# Patient Record
Sex: FEMALE | Race: WHITE | NOT HISPANIC OR LATINO | Employment: OTHER | URBAN - METROPOLITAN AREA
[De-identification: names, ages, dates, MRNs, and addresses within clinical notes are randomized per-mention and may not be internally consistent; named-entity substitution may affect disease eponyms.]

---

## 2018-04-16 ENCOUNTER — OFFICE VISIT CONVERTED (OUTPATIENT)
Dept: ORTHOPEDIC SURGERY | Facility: CLINIC | Age: 68
End: 2018-04-16
Attending: ORTHOPAEDIC SURGERY

## 2018-04-27 ENCOUNTER — OFFICE VISIT CONVERTED (OUTPATIENT)
Dept: CARDIOLOGY | Facility: CLINIC | Age: 68
End: 2018-04-27
Attending: INTERNAL MEDICINE

## 2018-05-23 ENCOUNTER — CONVERSION ENCOUNTER (OUTPATIENT)
Dept: GENERAL RADIOLOGY | Facility: HOSPITAL | Age: 68
End: 2018-05-23

## 2018-08-13 ENCOUNTER — CONVERSION ENCOUNTER (OUTPATIENT)
Dept: NEUROLOGY | Facility: CLINIC | Age: 68
End: 2018-08-13

## 2018-08-13 ENCOUNTER — OFFICE VISIT CONVERTED (OUTPATIENT)
Dept: NEUROSURGERY | Facility: CLINIC | Age: 68
End: 2018-08-13
Attending: PHYSICIAN ASSISTANT

## 2018-09-20 ENCOUNTER — OFFICE VISIT CONVERTED (OUTPATIENT)
Dept: GASTROENTEROLOGY | Facility: CLINIC | Age: 68
End: 2018-09-20
Attending: NURSE PRACTITIONER

## 2018-10-31 ENCOUNTER — OFFICE VISIT CONVERTED (OUTPATIENT)
Dept: ORTHOPEDIC SURGERY | Facility: CLINIC | Age: 68
End: 2018-10-31
Attending: ORTHOPAEDIC SURGERY

## 2018-11-08 ENCOUNTER — OFFICE VISIT CONVERTED (OUTPATIENT)
Dept: CARDIOLOGY | Facility: CLINIC | Age: 68
End: 2018-11-08
Attending: INTERNAL MEDICINE

## 2019-04-02 ENCOUNTER — CONVERSION ENCOUNTER (OUTPATIENT)
Dept: CARDIOLOGY | Facility: CLINIC | Age: 69
End: 2019-04-02

## 2019-04-10 ENCOUNTER — OFFICE VISIT CONVERTED (OUTPATIENT)
Dept: CARDIOLOGY | Facility: CLINIC | Age: 69
End: 2019-04-10
Attending: INTERNAL MEDICINE

## 2019-04-18 ENCOUNTER — OFFICE VISIT CONVERTED (OUTPATIENT)
Dept: CARDIOLOGY | Facility: CLINIC | Age: 69
End: 2019-04-18
Attending: INTERNAL MEDICINE

## 2019-04-30 ENCOUNTER — HOSPITAL ENCOUNTER (OUTPATIENT)
Dept: LAB | Facility: HOSPITAL | Age: 69
Discharge: HOME | End: 2019-04-30
Attending: INTERNAL MEDICINE

## 2019-04-30 LAB
ANION GAP SERPL CALC-SCNC: 15 MMOL/L (ref 8–19)
BNP SERPL-MCNC: 394 PG/ML (ref 0–900)
BUN SERPL-MCNC: 22 MG/DL (ref 5–25)
BUN/CREAT SERPL: 24 {RATIO} (ref 6–20)
CALCIUM SERPL-MCNC: 9.7 MG/DL (ref 8.7–10.4)
CHLORIDE SERPL-SCNC: 102 MMOL/L (ref 99–111)
CONV CO2: 27 MMOL/L (ref 22–32)
CREAT UR-MCNC: 0.93 MG/DL (ref 0.5–0.9)
GFR SERPLBLD BASED ON 1.73 SQ M-ARVRAT: >60 ML/MIN/{1.73_M2}
GLUCOSE SERPL-MCNC: 109 MG/DL (ref 65–99)
OSMOLALITY SERPL CALC.SUM OF ELEC: 294 MOSM/KG (ref 273–304)
POTASSIUM SERPL-SCNC: 4.1 MMOL/L (ref 3.5–5.3)
SODIUM SERPL-SCNC: 140 MMOL/L (ref 135–147)

## 2019-05-02 ENCOUNTER — HOSPITAL ENCOUNTER (OUTPATIENT)
Dept: GENERAL RADIOLOGY | Facility: HOSPITAL | Age: 69
Discharge: HOME | End: 2019-05-02
Attending: NURSE PRACTITIONER

## 2019-05-30 ENCOUNTER — HOSPITAL ENCOUNTER (OUTPATIENT)
Dept: LAB | Facility: HOSPITAL | Age: 69
Discharge: HOME | End: 2019-05-30
Attending: INTERNAL MEDICINE

## 2019-05-30 ENCOUNTER — OFFICE VISIT CONVERTED (OUTPATIENT)
Dept: CARDIOLOGY | Facility: CLINIC | Age: 69
End: 2019-05-30
Attending: INTERNAL MEDICINE

## 2019-05-30 LAB
ALBUMIN SERPL-MCNC: 4.2 G/DL (ref 3.5–5)
ALBUMIN/GLOB SERPL: 1.6 {RATIO} (ref 1.4–2.6)
ALP SERPL-CCNC: 91 U/L (ref 43–160)
ALT SERPL-CCNC: 17 U/L (ref 10–40)
ANION GAP SERPL CALC-SCNC: 16 MMOL/L (ref 8–19)
AST SERPL-CCNC: 15 U/L (ref 15–50)
BASOPHILS # BLD AUTO: 0.15 10*3/UL (ref 0–0.2)
BASOPHILS NFR BLD AUTO: 1.5 % (ref 0–3)
BILIRUB SERPL-MCNC: 0.41 MG/DL (ref 0.2–1.3)
BUN SERPL-MCNC: 22 MG/DL (ref 5–25)
BUN/CREAT SERPL: 20 {RATIO} (ref 6–20)
CALCIUM SERPL-MCNC: 9.4 MG/DL (ref 8.7–10.4)
CHLORIDE SERPL-SCNC: 100 MMOL/L (ref 99–111)
CHOLEST SERPL-MCNC: 203 MG/DL (ref 107–200)
CHOLEST/HDLC SERPL: 4 {RATIO} (ref 3–6)
CONV ABS IMM GRAN: 0.04 10*3/UL (ref 0–0.2)
CONV CO2: 27 MMOL/L (ref 22–32)
CONV IMMATURE GRAN: 0.4 % (ref 0–1.8)
CONV TOTAL PROTEIN: 6.9 G/DL (ref 6.3–8.2)
CREAT UR-MCNC: 1.12 MG/DL (ref 0.5–0.9)
DEPRECATED RDW RBC AUTO: 39.1 FL (ref 36.4–46.3)
EOSINOPHIL # BLD AUTO: 0.23 10*3/UL (ref 0–0.7)
EOSINOPHIL # BLD AUTO: 2.4 % (ref 0–7)
ERYTHROCYTE [DISTWIDTH] IN BLOOD BY AUTOMATED COUNT: 19.5 % (ref 11.7–14.4)
GFR SERPLBLD BASED ON 1.73 SQ M-ARVRAT: 50 ML/MIN/{1.73_M2}
GLOBULIN UR ELPH-MCNC: 2.7 G/DL (ref 2–3.5)
GLUCOSE SERPL-MCNC: 103 MG/DL (ref 65–99)
HBA1C MFR BLD: 11.8 G/DL (ref 12–16)
HCT VFR BLD AUTO: 39.2 % (ref 37–47)
HDLC SERPL-MCNC: 51 MG/DL (ref 40–60)
LDLC SERPL CALC-MCNC: 129 MG/DL (ref 70–100)
LYMPHOCYTES # BLD AUTO: 1.96 10*3/UL (ref 1–5)
MCH RBC QN AUTO: 18.8 PG (ref 27–31)
MCHC RBC AUTO-ENTMCNC: 30.1 G/DL (ref 33–37)
MCV RBC AUTO: 62.6 FL (ref 81–99)
MONOCYTES # BLD AUTO: 0.81 10*3/UL (ref 0.2–1.2)
MONOCYTES NFR BLD AUTO: 8.3 % (ref 3–10)
NEUTROPHILS # BLD AUTO: 6.56 10*3/UL (ref 2–8)
NEUTROPHILS NFR BLD AUTO: 67.3 % (ref 30–85)
NRBC CBCN: 0 % (ref 0–0.7)
OSMOLALITY SERPL CALC.SUM OF ELEC: 292 MOSM/KG (ref 273–304)
PLATELET # BLD AUTO: 221 10*3/UL (ref 130–400)
PMV BLD AUTO: ABNORMAL FL (ref 9.4–12.3)
POTASSIUM SERPL-SCNC: 4.4 MMOL/L (ref 3.5–5.3)
RBC # BLD AUTO: 6.26 10*6/UL (ref 4.2–5.4)
SODIUM SERPL-SCNC: 139 MMOL/L (ref 135–147)
T4 FREE SERPL-MCNC: 1.5 NG/DL (ref 0.9–1.8)
TRIGL SERPL-MCNC: 114 MG/DL (ref 40–150)
TSH SERPL-ACNC: 8.47 M[IU]/L (ref 0.27–4.2)
VARIANT LYMPHS NFR BLD MANUAL: 20.1 % (ref 20–45)
VLDLC SERPL-MCNC: 23 MG/DL (ref 5–37)
WBC # BLD AUTO: 9.75 10*3/UL (ref 4.8–10.8)

## 2019-07-02 ENCOUNTER — HOSPITAL ENCOUNTER (OUTPATIENT)
Dept: LAB | Facility: HOSPITAL | Age: 69
Discharge: HOME | End: 2019-07-02
Attending: INTERNAL MEDICINE

## 2019-07-02 LAB
ALBUMIN SERPL-MCNC: 4 G/DL (ref 3.5–5)
ALBUMIN/GLOB SERPL: 1.5 {RATIO} (ref 1.4–2.6)
ALP SERPL-CCNC: 79 U/L (ref 43–160)
ALT SERPL-CCNC: 34 U/L (ref 10–40)
ANION GAP SERPL CALC-SCNC: 18 MMOL/L (ref 8–19)
AST SERPL-CCNC: 24 U/L (ref 15–50)
BILIRUB SERPL-MCNC: 0.58 MG/DL (ref 0.2–1.3)
BUN SERPL-MCNC: 21 MG/DL (ref 5–25)
BUN/CREAT SERPL: 18 {RATIO} (ref 6–20)
CALCIUM SERPL-MCNC: 9.6 MG/DL (ref 8.7–10.4)
CHLORIDE SERPL-SCNC: 105 MMOL/L (ref 99–111)
CONV CO2: 25 MMOL/L (ref 22–32)
CONV TOTAL PROTEIN: 6.6 G/DL (ref 6.3–8.2)
CREAT UR-MCNC: 1.15 MG/DL (ref 0.5–0.9)
GFR SERPLBLD BASED ON 1.73 SQ M-ARVRAT: 48 ML/MIN/{1.73_M2}
GLOBULIN UR ELPH-MCNC: 2.6 G/DL (ref 2–3.5)
GLUCOSE SERPL-MCNC: 129 MG/DL (ref 65–99)
OSMOLALITY SERPL CALC.SUM OF ELEC: 303 MOSM/KG (ref 273–304)
POTASSIUM SERPL-SCNC: 4 MMOL/L (ref 3.5–5.3)
SODIUM SERPL-SCNC: 144 MMOL/L (ref 135–147)
T4 FREE SERPL-MCNC: 1.8 NG/DL (ref 0.9–1.8)
TSH SERPL-ACNC: 6.14 M[IU]/L (ref 0.27–4.2)

## 2019-07-08 ENCOUNTER — OFFICE VISIT (OUTPATIENT)
Dept: CARDIOLOGY | Facility: CLINIC | Age: 69
End: 2019-07-08

## 2019-07-08 DIAGNOSIS — I10 ESSENTIAL HYPERTENSION: ICD-10-CM

## 2019-07-08 DIAGNOSIS — I48.92 ATRIAL FLUTTER WITH CONTROLLED RESPONSE (HCC): ICD-10-CM

## 2019-07-08 DIAGNOSIS — I48.0 PAROXYSMAL ATRIAL FIBRILLATION (HCC): Primary | ICD-10-CM

## 2019-07-08 DIAGNOSIS — I47.1 ATRIAL TACHYCARDIA (HCC): ICD-10-CM

## 2019-07-08 PROCEDURE — 99214 OFFICE O/P EST MOD 30 MIN: CPT | Performed by: INTERNAL MEDICINE

## 2019-07-08 RX ORDER — FUROSEMIDE 20 MG/1
20 TABLET ORAL DAILY PRN
COMMUNITY
Start: 2019-06-25 | End: 2022-02-24

## 2019-07-08 RX ORDER — DILTIAZEM HYDROCHLORIDE 180 MG/1
180 CAPSULE, COATED, EXTENDED RELEASE ORAL DAILY
COMMUNITY
Start: 2019-06-25 | End: 2021-09-02

## 2019-07-08 RX ORDER — AMIODARONE HYDROCHLORIDE 200 MG/1
200 TABLET ORAL 2 TIMES DAILY
COMMUNITY
Start: 2019-06-11 | End: 2019-08-19

## 2019-07-11 ENCOUNTER — TELEPHONE (OUTPATIENT)
Dept: CARDIOLOGY | Facility: CLINIC | Age: 69
End: 2019-07-11

## 2019-07-11 DIAGNOSIS — I48.91 ATRIAL FIBRILLATION, UNSPECIFIED TYPE (HCC): Primary | ICD-10-CM

## 2019-07-11 NOTE — TELEPHONE ENCOUNTER
Patient saw Dr Oliver on Monday 07/08/19,  Inquiring about her ablation surgery date.  States still having trouble walking and short of breath ( was having trouble when she was here Monday).    [phone] or [phone]

## 2019-07-13 PROBLEM — Z98.890 H/O CARDIAC RADIOFREQUENCY ABLATION: Status: ACTIVE | Noted: 2019-07-13

## 2019-07-13 PROBLEM — I10 ESSENTIAL HYPERTENSION: Status: ACTIVE | Noted: 2019-07-13

## 2019-07-13 PROBLEM — I47.1 ATRIAL TACHYCARDIA: Status: ACTIVE | Noted: 2019-07-13

## 2019-07-13 PROBLEM — I48.0 PAROXYSMAL ATRIAL FIBRILLATION: Status: ACTIVE | Noted: 2019-07-13

## 2019-07-13 PROBLEM — I48.92 ATRIAL FLUTTER WITH CONTROLLED RESPONSE: Status: ACTIVE | Noted: 2019-07-13

## 2019-07-13 PROBLEM — I47.19 ATRIAL TACHYCARDIA: Status: ACTIVE | Noted: 2019-07-13

## 2019-07-13 PROCEDURE — 93000 ELECTROCARDIOGRAM COMPLETE: CPT | Performed by: INTERNAL MEDICINE

## 2019-08-08 ENCOUNTER — HOSPITAL ENCOUNTER (OUTPATIENT)
Dept: LAB | Facility: HOSPITAL | Age: 69
Discharge: HOME | End: 2019-08-08
Attending: INTERNAL MEDICINE

## 2019-08-08 LAB
BASOPHILS # BLD AUTO: 0.09 10*3/UL (ref 0–0.2)
BASOPHILS NFR BLD AUTO: 1 % (ref 0–3)
BURR CELLS BLD QL SMEAR: NORMAL
C3 FRG RBC-MCNC: NORMAL
CONV ABS IMM GRAN: 0.05 10*3/UL (ref 0–0.2)
CONV ANISOCYTES: NORMAL
CONV HYPOCHROMIA IN BLOOD BY LIGHT MICROSCOPY: NORMAL
CONV IMMATURE GRAN: 0.6 % (ref 0–1.8)
DACRYOCYTES BLD QL SMEAR: NORMAL
DEPRECATED RDW RBC AUTO: 37.9 FL (ref 36.4–46.3)
EOSINOPHIL # BLD AUTO: 0.14 10*3/UL (ref 0–0.7)
EOSINOPHIL # BLD AUTO: 1.6 % (ref 0–7)
ERYTHROCYTE [DISTWIDTH] IN BLOOD BY AUTOMATED COUNT: 19.5 % (ref 11.7–14.4)
ERYTHROCYTE [SEDIMENTATION RATE] IN BLOOD: 8 MM/H (ref 0–30)
HCT VFR BLD AUTO: 33.3 % (ref 37–47)
HGB BLD-MCNC: 9.7 G/DL (ref 12–16)
LYMPHOCYTES # BLD AUTO: 0.71 10*3/UL (ref 1–5)
LYMPHOCYTES NFR BLD AUTO: 8 % (ref 20–45)
MCH RBC QN AUTO: 17.4 PG (ref 27–31)
MCHC RBC AUTO-ENTMCNC: 29.1 G/DL (ref 33–37)
MCV RBC AUTO: 59.7 FL (ref 81–99)
MICROCYTES BLD QL: SLIGHT
MONOCYTES # BLD AUTO: 0.79 10*3/UL (ref 0.2–1.2)
MONOCYTES NFR BLD AUTO: 8.9 % (ref 3–10)
NEUTROPHILS # BLD AUTO: 7.08 10*3/UL (ref 2–8)
NEUTROPHILS NFR BLD AUTO: 79.9 % (ref 30–85)
NRBC CBCN: 0 % (ref 0–0.7)
OVALOCYTES BLD QL SMEAR: NORMAL
PLATELET # BLD AUTO: 290 10*3/UL (ref 130–400)
PMV BLD AUTO: ABNORMAL FL (ref 9.4–12.3)
POIKILOCYTOSIS BLD QL SMEAR: SLIGHT
RBC # BLD AUTO: 5.58 10*6/UL (ref 4.2–5.4)
ROULEAUX BLD QL SMEAR: SLIGHT
TARGETS BLD QL SMEAR: SLIGHT
WBC # BLD AUTO: 8.86 10*3/UL (ref 4.8–10.8)

## 2019-08-09 LAB
ALBUMIN SERPL-MCNC: 4 G/DL (ref 3.5–5)
ALBUMIN/GLOB SERPL: 1.7 {RATIO} (ref 1.4–2.6)
ALP SERPL-CCNC: 62 U/L (ref 43–160)
ALT SERPL-CCNC: 37 U/L (ref 10–40)
ANION GAP SERPL CALC-SCNC: 16 MMOL/L (ref 8–19)
AST SERPL-CCNC: 23 U/L (ref 15–50)
BILIRUB SERPL-MCNC: 1.05 MG/DL (ref 0.2–1.3)
BUN SERPL-MCNC: 15 MG/DL (ref 5–25)
BUN/CREAT SERPL: 16 {RATIO} (ref 6–20)
CALCIUM SERPL-MCNC: 9.5 MG/DL (ref 8.7–10.4)
CHLORIDE SERPL-SCNC: 101 MMOL/L (ref 99–111)
CONV CO2: 27 MMOL/L (ref 22–32)
CONV TOTAL PROTEIN: 6.4 G/DL (ref 6.3–8.2)
CREAT UR-MCNC: 0.96 MG/DL (ref 0.5–0.9)
GFR SERPLBLD BASED ON 1.73 SQ M-ARVRAT: 60 ML/MIN/{1.73_M2}
GLOBULIN UR ELPH-MCNC: 2.4 G/DL (ref 2–3.5)
GLUCOSE SERPL-MCNC: 101 MG/DL (ref 65–99)
OSMOLALITY SERPL CALC.SUM OF ELEC: 291 MOSM/KG (ref 273–304)
POTASSIUM SERPL-SCNC: 4 MMOL/L (ref 3.5–5.3)
SODIUM SERPL-SCNC: 140 MMOL/L (ref 135–147)
T4 FREE SERPL-MCNC: 1.8 NG/DL (ref 0.9–1.8)
TSH SERPL-ACNC: 4.06 M[IU]/L (ref 0.27–4.2)

## 2019-08-19 ENCOUNTER — OFFICE VISIT (OUTPATIENT)
Dept: CARDIOLOGY | Facility: CLINIC | Age: 69
End: 2019-08-19

## 2019-08-19 VITALS
HEIGHT: 65 IN | SYSTOLIC BLOOD PRESSURE: 110 MMHG | BODY MASS INDEX: 26.89 KG/M2 | WEIGHT: 161.4 LBS | DIASTOLIC BLOOD PRESSURE: 70 MMHG

## 2019-08-19 DIAGNOSIS — Z98.890 H/O CARDIAC RADIOFREQUENCY ABLATION: ICD-10-CM

## 2019-08-19 DIAGNOSIS — I47.1 ATRIAL TACHYCARDIA (HCC): Primary | ICD-10-CM

## 2019-08-19 DIAGNOSIS — Z95.0 PRESENCE OF CARDIAC PACEMAKER: ICD-10-CM

## 2019-08-19 PROCEDURE — 99213 OFFICE O/P EST LOW 20 MIN: CPT | Performed by: INTERNAL MEDICINE

## 2019-08-19 PROCEDURE — 93000 ELECTROCARDIOGRAM COMPLETE: CPT | Performed by: INTERNAL MEDICINE

## 2019-08-19 RX ORDER — QUETIAPINE FUMARATE 25 MG/1
12.5 TABLET, FILM COATED ORAL NIGHTLY
COMMUNITY
End: 2021-09-02

## 2019-08-19 RX ORDER — PAROXETINE 30 MG/1
30 TABLET, FILM COATED ORAL EVERY MORNING
COMMUNITY
End: 2021-09-02

## 2019-08-20 NOTE — PROGRESS NOTES
Subjective:     Encounter Date:08/19/2019      Patient ID: Suzanna Santos is a 68 y.o. female.    Chief Complaint:  Patient presents for followup post ablation.    HPI:  Ms Santos is a 67 yo patient of Dr. Shook who has a past medical history significant for HTN and paroxysmal atrial fibrillation.  She underwent her first AF ablation in 2008 in [place] and apparently did well for awhile but had recurrent palpitaitons and SVT.  She was taken back to the lab by Dr. THOMPSON in 7/17.  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 from the previojus ablation.  The roof line required additional ablation and she was found to have  3 different left atrial tachycardias.  One was mapped and found to be originating on the anterior LA just below the left atrial appendage.  A left atrial tachycardia was mapped to the lateral mitral annulus and was ablated.  The third focus originated between the RONEN and the LSVP.   She was placed on amiodarone post procedure.  The amio was discontinued 1/18 but she developed recurrent tachy palpitations by 5/18.  An event monitor showed episodes of SVT in the 130's.  She was placed back on amio but continued to have symptoms.    She was taken back to the lab 3/19 and was found to have an atypical right atrial flutter, an ectopic right atrial tachycardia near the coronary sinus os and a aleft atrial tachycardia near the AV node.  All the tachycardias were ablated.  She had a pacemaker placed for significant sinus node dysfunction.    She again did well for awhile but had recurrent SVT in the 130-150 range.  She was taken back to the lab again 7/19 and found to have reentrant left atrial flutter which was ablated by creation of a line between the RONEN and the RSPV.  A focal left atrial tachycardia was ablated from the left side of the interatrial septum and she was rendered noninducible.  Her amiodarone and diltiazem were discontinued but was continued on metoprolol.    Since her ablation she  has done well without recurrent symptoms.    The following portions of the patient's history were reviewed and updated as appropriate: allergies, current medications, past family history, past medical history, past social history, past surgical history and problem list.    Problem List:  Patient Active Problem List   Diagnosis   • Paroxysmal atrial fibrillation (CMS/HCC)   • Atrial tachycardia (CMS/HCC)   • Atrial flutter with controlled response (CMS/HCC)   • H/O cardiac radiofrequency ablation   • Essential hypertension   • Presence of cardiac pacemaker       Past Medical History:  Past Medical History:   Diagnosis Date   • Atrial tachycardia (CMS/HCC)    • GERD (gastroesophageal reflux disease)    • Hypertension    • Paroxysmal atrial fibrillation (CMS/HCC)    • SVT (supraventricular tachycardia) (CMS/HCC)        Past Surgical History:  Past Surgical History:   Procedure Laterality Date   • CARDIAC ELECTROPHYSIOLOGY MAPPING AND ABLATION     • PACEMAKER REPLACEMENT         Social History:  Social History     Socioeconomic History   • Marital status:      Spouse name: Not on file   • Number of children: Not on file   • Years of education: Not on file   • Highest education level: Not on file   Tobacco Use   • Smoking status: Never Smoker   • Smokeless tobacco: Never Used   Substance and Sexual Activity   • Alcohol use: No     Frequency: Never   • Drug use: No       Allergies:  Allergies   Allergen Reactions   • Doxycycline Unknown (See Comments)     .   • Lariam [Mefloquine] Unknown (See Comments)     .   • Lisinopril Hives     .   • Talwin [Pentazocine] Hallucinations       Immunizations:    There is no immunization history on file for this patient.    ROS:  Review of Systems   Constitution: Negative.   HENT: Negative.    Eyes: Negative.    Cardiovascular: Negative for chest pain, dyspnea on exertion, irregular heartbeat, leg swelling, near-syncope, orthopnea, palpitations and paroxysmal nocturnal dyspnea.  "  Respiratory: Negative for shortness of breath.    Endocrine: Negative.    Hematologic/Lymphatic: Negative.    Skin: Negative.    Musculoskeletal: Negative.    Gastrointestinal: Negative.    Genitourinary: Negative.    Neurological: Negative.    Psychiatric/Behavioral: Negative.    Allergic/Immunologic: Negative.           Objective:         /70   Ht 165.1 cm (65\")   Wt 73.2 kg (161 lb 6.4 oz)   BMI 26.86 kg/m²     Physical Exam   Constitutional: She is oriented to person, place, and time. She appears well-developed and well-nourished. No distress.   HENT:   Head: Normocephalic and atraumatic.   Eyes: Conjunctivae and EOM are normal. Pupils are equal, round, and reactive to light. No scleral icterus.   Neck: Normal range of motion. No thyromegaly present.   Cardiovascular: Normal rate, regular rhythm and normal heart sounds.   Pulmonary/Chest: Effort normal and breath sounds normal.   Abdominal: Soft. Bowel sounds are normal.   Musculoskeletal: Normal range of motion.   Neurological: She is alert and oriented to person, place, and time.   Skin: Skin is warm and dry.   Psychiatric: She has a normal mood and affect.       In-Office Procedure(s):    ECG 12 Lead  Date/Time: 8/19/2019 9:58 AM  Performed by: Mesfin Oliver MD  Authorized by: Mesfin Oliver MD   Comparison: compared with previous ECG from 7/8/2019  Comparison to previous ECG: V paced  Rhythm: paced  Pacing: dual chamber pacing and 100% capture  Clinical impression: abnormal EKG            ASCVD RIsk Score::  The ASCVD Risk score ([place] DENISA Zepeda., et al., 2013) failed to calculate for the following reasons:    Cannot find a previous HDL lab    Cannot find a previous total cholesterol lab    Recent Radiology:  Imaging Results (most recent)     None          Lab Review:   No visits with results within 6 Month(s) from this visit.   Latest known visit with results is:   No results found for any previous visit.                Assessment:     "      Diagnosis Plan   1. Atrial tachycardia (CMS/HCC)     2. Presence of cardiac pacemaker     3. H/O cardiac radiofrequency ablation            Plan:      1. Atrial tachycardia - doing well post ablation without recurrence, off amiodarone.  Will continue apixoban and metoprolol    RTC 6 months      Level of Care:                 Mesfin Oliver MD  08/20/19  .

## 2019-08-22 PROBLEM — Z95.0 PRESENCE OF CARDIAC PACEMAKER: Status: ACTIVE | Noted: 2019-08-22

## 2019-09-20 ENCOUNTER — HOSPITAL ENCOUNTER (OUTPATIENT)
Dept: GENERAL RADIOLOGY | Facility: HOSPITAL | Age: 69
Discharge: HOME | End: 2019-09-20
Attending: OBSTETRICS & GYNECOLOGY

## 2019-09-30 ENCOUNTER — HOSPITAL ENCOUNTER (OUTPATIENT)
Dept: LAB | Facility: HOSPITAL | Age: 69
Discharge: HOME | End: 2019-09-30
Attending: INTERNAL MEDICINE

## 2019-09-30 LAB
ALBUMIN SERPL-MCNC: 4.3 G/DL (ref 3.5–5)
ALBUMIN/GLOB SERPL: 1.7 {RATIO} (ref 1.4–2.6)
ALP SERPL-CCNC: 74 U/L (ref 43–160)
ALT SERPL-CCNC: 17 U/L (ref 10–40)
ANION GAP SERPL CALC-SCNC: 19 MMOL/L (ref 8–19)
AST SERPL-CCNC: 21 U/L (ref 15–50)
BASOPHILS # BLD AUTO: 0.15 10*3/UL (ref 0–0.2)
BASOPHILS NFR BLD AUTO: 1.9 % (ref 0–3)
BILIRUB SERPL-MCNC: 0.79 MG/DL (ref 0.2–1.3)
BUN SERPL-MCNC: 14 MG/DL (ref 5–25)
BUN/CREAT SERPL: 15 {RATIO} (ref 6–20)
CALCIUM SERPL-MCNC: 9.6 MG/DL (ref 8.7–10.4)
CHLORIDE SERPL-SCNC: 101 MMOL/L (ref 99–111)
CONV ABS IMM GRAN: 0.03 10*3/UL (ref 0–0.2)
CONV CO2: 24 MMOL/L (ref 22–32)
CONV IMMATURE GRAN: 0.4 % (ref 0–1.8)
CONV TOTAL PROTEIN: 6.9 G/DL (ref 6.3–8.2)
CREAT UR-MCNC: 0.91 MG/DL (ref 0.5–0.9)
DEPRECATED RDW RBC AUTO: 56 FL (ref 36.4–46.3)
EOSINOPHIL # BLD AUTO: 0.26 10*3/UL (ref 0–0.7)
EOSINOPHIL # BLD AUTO: 3.3 % (ref 0–7)
ERYTHROCYTE [DISTWIDTH] IN BLOOD BY AUTOMATED COUNT: 25.7 % (ref 11.7–14.4)
GFR SERPLBLD BASED ON 1.73 SQ M-ARVRAT: >60 ML/MIN/{1.73_M2}
GLOBULIN UR ELPH-MCNC: 2.6 G/DL (ref 2–3.5)
GLUCOSE SERPL-MCNC: 98 MG/DL (ref 65–99)
HCT VFR BLD AUTO: 37.8 % (ref 37–47)
HGB BLD-MCNC: 11.2 G/DL (ref 12–16)
LYMPHOCYTES # BLD AUTO: 1.3 10*3/UL (ref 1–5)
LYMPHOCYTES NFR BLD AUTO: 16.6 % (ref 20–45)
MCH RBC QN AUTO: 18.9 PG (ref 27–31)
MCHC RBC AUTO-ENTMCNC: 29.6 G/DL (ref 33–37)
MCV RBC AUTO: 63.9 FL (ref 81–99)
MONOCYTES # BLD AUTO: 0.76 10*3/UL (ref 0.2–1.2)
MONOCYTES NFR BLD AUTO: 9.7 % (ref 3–10)
NEUTROPHILS # BLD AUTO: 5.35 10*3/UL (ref 2–8)
NEUTROPHILS NFR BLD AUTO: 68.1 % (ref 30–85)
NRBC CBCN: 0.3 % (ref 0–0.7)
OSMOLALITY SERPL CALC.SUM OF ELEC: 288 MOSM/KG (ref 273–304)
PLATELET # BLD AUTO: 227 10*3/UL (ref 130–400)
PMV BLD AUTO: ABNORMAL FL (ref 9.4–12.3)
POTASSIUM SERPL-SCNC: 4.5 MMOL/L (ref 3.5–5.3)
RBC # BLD AUTO: 5.92 10*6/UL (ref 4.2–5.4)
SODIUM SERPL-SCNC: 139 MMOL/L (ref 135–147)
T4 FREE SERPL-MCNC: 1.6 NG/DL (ref 0.9–1.8)
TSH SERPL-ACNC: 3.62 M[IU]/L (ref 0.27–4.2)
WBC # BLD AUTO: 7.85 10*3/UL (ref 4.8–10.8)

## 2019-10-03 ENCOUNTER — OFFICE VISIT CONVERTED (OUTPATIENT)
Dept: CARDIOLOGY | Facility: CLINIC | Age: 69
End: 2019-10-03
Attending: INTERNAL MEDICINE

## 2019-12-19 ENCOUNTER — OFFICE VISIT (OUTPATIENT)
Dept: CARDIOLOGY | Facility: CLINIC | Age: 69
End: 2019-12-19

## 2019-12-19 VITALS
DIASTOLIC BLOOD PRESSURE: 86 MMHG | BODY MASS INDEX: 28.41 KG/M2 | HEART RATE: 90 BPM | SYSTOLIC BLOOD PRESSURE: 115 MMHG | HEIGHT: 65 IN | WEIGHT: 170.5 LBS

## 2019-12-19 DIAGNOSIS — I48.0 PAROXYSMAL ATRIAL FIBRILLATION (HCC): Primary | ICD-10-CM

## 2019-12-19 DIAGNOSIS — I47.1 ATRIAL TACHYCARDIA (HCC): ICD-10-CM

## 2019-12-19 DIAGNOSIS — Z98.890 H/O CARDIAC RADIOFREQUENCY ABLATION: ICD-10-CM

## 2019-12-19 DIAGNOSIS — Z95.0 PRESENCE OF CARDIAC PACEMAKER: ICD-10-CM

## 2019-12-19 DIAGNOSIS — I49.5 SSS (SICK SINUS SYNDROME) (HCC): ICD-10-CM

## 2019-12-19 DIAGNOSIS — I48.92 ATRIAL FLUTTER WITH CONTROLLED RESPONSE (HCC): ICD-10-CM

## 2019-12-19 PROCEDURE — 93288 INTERROG EVL PM/LDLS PM IP: CPT | Performed by: INTERNAL MEDICINE

## 2019-12-19 PROCEDURE — 99213 OFFICE O/P EST LOW 20 MIN: CPT | Performed by: INTERNAL MEDICINE

## 2019-12-19 NOTE — PROGRESS NOTES
Subjective:     Encounter Date:12/19/2019      Patient ID: Suzanna Santos is a 68 y.o. female.    Chief Complaint:  Followup atrial fibrillation and flutter post ablation, pacemaker    HPI:  Ms Santos  Presents for followup of her atrial fibrillation, atypical atrial flutter and ectopic atrial tachycardia post ablation.  She is a 67 yo patient of Dr. Shook with a past medical  History significant for HTN and paroxysmal Afib.  She underwent her first ablation in 2008 in [place] and apparently did well for awhile but had recurrent palpitations and SVT.  She was taken back to the lab by Dr. THOMPSON in 7/17.  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 but the roof line required additrional ablation and she was found to have 3 different left atrial tachycardias which were ablated.  She was placed on amiodarone and it weas eventually discontinued in 1/18.  However, she developed recurrent SVT in the 130's and she was placed back on amio but still had episodes.    She was taken back to the lab 3/19 and was found to have an atypical rigfht atrial flutter, an 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 and a left atrial tachycardia near the AV node.  All the tachycardias were ablated.  She also had a pacemaker implanted for significant sinus node dysfunction.  She developed recurrent SVT in the 130-150 range and was taken back to the lab again 7/19 and was found to have reentrant left atrial lfutter which ablated by creation of a line between the RONEN and RSPV.  A flocal left atrial tachycardia was ablated from the left side of the interatrial septum and she was rendered noninducible.  Her amiodarone and diltiazem were discontinued but was continued on metoprolol.    Since her ablation she has done well without recurrent palpitations or SVT.      The following portions of the patient's history were reviewed and updated as appropriate: allergies, current medications, past family history, past medical history, past  "social history, past surgical history and problem list.    Problem List:  Patient Active Problem List   Diagnosis   • Paroxysmal atrial fibrillation (CMS/HCC)   • Atrial tachycardia (CMS/HCC)   • Atrial flutter with controlled response (CMS/HCC)   • H/O cardiac radiofrequency ablation   • Essential hypertension   • Presence of cardiac pacemaker   • SSS (sick sinus syndrome) (CMS/HCC)       Past Medical History:  Past Medical History:   Diagnosis Date   • Atrial tachycardia (CMS/HCC)    • GERD (gastroesophageal reflux disease)    • Hypertension    • Paroxysmal atrial fibrillation (CMS/HCC)    • SVT (supraventricular tachycardia) (CMS/HCC)        Past Surgical History:  Past Surgical History:   Procedure Laterality Date   • CARDIAC ELECTROPHYSIOLOGY MAPPING AND ABLATION     • PACEMAKER REPLACEMENT         Social History:  Social History     Socioeconomic History   • Marital status:      Spouse name: Not on file   • Number of children: Not on file   • Years of education: Not on file   • Highest education level: Not on file   Tobacco Use   • Smoking status: Never Smoker   • Smokeless tobacco: Never Used   Substance and Sexual Activity   • Alcohol use: No     Frequency: Never   • Drug use: No       Allergies:  Allergies   Allergen Reactions   • Doxycycline Unknown (See Comments)     .   • Lariam [Mefloquine] Unknown (See Comments)     .   • Lisinopril Hives     .   • Talwin [Pentazocine] Hallucinations       Immunizations:    There is no immunization history on file for this patient.    ROS:  Review of Systems   Constitution: Negative for malaise/fatigue.   Cardiovascular: Negative for chest pain, dyspnea on exertion, irregular heartbeat, near-syncope, palpitations and syncope.   Respiratory: Negative for shortness of breath.    All other systems reviewed and are negative.         Objective:         /86   Pulse 90   Ht 165.1 cm (65\")   Wt 77.3 kg (170 lb 8 oz)   BMI 28.37 kg/m²     Physical Exam "   Constitutional: She is oriented to person, place, and time. She appears well-developed and well-nourished. No distress.   HENT:   Head: Normocephalic and atraumatic.   Eyes: Pupils are equal, round, and reactive to light. Conjunctivae and EOM are normal. No scleral icterus.   Neck: Normal range of motion. No thyromegaly present.   Cardiovascular: Normal rate, regular rhythm and normal heart sounds.   Pulmonary/Chest: Effort normal and breath sounds normal.   Abdominal: Soft. Bowel sounds are normal.   Musculoskeletal: Normal range of motion.   Neurological: She is alert and oriented to person, place, and time.   Skin: Skin is warm and dry.   Psychiatric: She has a normal mood and affect.       In-Office Procedure(s):  Procedures Pacemaker Eval interpreted by [id number]  Battery GRACIELA    P wave 1.9mv  Threshold 1.0V  Impedance 450 ohms    R wave 4mv  Threshold 0.8V  Impedance 530 ohms    Events - no AF    ASCVD RIsk Score::  The ASCVD Risk score ([place] DENISA Jr., et al., 2013) failed to calculate for the following reasons:    Cannot find a previous HDL lab    Cannot find a previous total cholesterol lab    Recent Radiology:  Imaging Results (Most Recent)     None          Lab Review:   No visits with results within 6 Month(s) from this visit.   Latest known visit with results is:   No results found for any previous visit.                Assessment:          Diagnosis Plan   1. Paroxysmal atrial fibrillation (CMS/HCC)     2. Atrial tachycardia (CMS/HCC)     3. Atrial flutter with controlled response (CMS/HCC)     4. H/O cardiac radiofrequency ablation     5. SSS (sick sinus syndrome) (CMS/HCC)     6. Presence of cardiac pacemaker            Plan:      1. Paroxysmal Afib - no recurrence since ablation, off amio  2. Atypical atrial flutter - no recurrence since ablation, off amio  3. Ectopic atrial tachycardia - no recurrence  4. SSS - s/p pacemaker  5. Pacemaker followup - normal device function    She will followup with  Dr. Shook  RTC prn      Level of Care:                 Mesfin Oliver MD  12/19/19  .

## 2020-01-22 ENCOUNTER — HOSPITAL ENCOUNTER (OUTPATIENT)
Dept: LAB | Facility: HOSPITAL | Age: 70
Discharge: HOME | End: 2020-01-22
Attending: INTERNAL MEDICINE

## 2020-01-22 LAB
ANION GAP SERPL CALC-SCNC: 16 MMOL/L (ref 8–19)
BUN SERPL-MCNC: 15 MG/DL (ref 5–25)
BUN/CREAT SERPL: 16 {RATIO} (ref 6–20)
CALCIUM SERPL-MCNC: 9.8 MG/DL (ref 8.7–10.4)
CHLORIDE SERPL-SCNC: 102 MMOL/L (ref 99–111)
CONV CO2: 26 MMOL/L (ref 22–32)
CREAT UR-MCNC: 0.95 MG/DL (ref 0.5–0.9)
GFR SERPLBLD BASED ON 1.73 SQ M-ARVRAT: >60 ML/MIN/{1.73_M2}
GLUCOSE SERPL-MCNC: 96 MG/DL (ref 65–99)
OSMOLALITY SERPL CALC.SUM OF ELEC: 289 MOSM/KG (ref 273–304)
POTASSIUM SERPL-SCNC: 4.8 MMOL/L (ref 3.5–5.3)
SODIUM SERPL-SCNC: 139 MMOL/L (ref 135–147)

## 2020-03-31 ENCOUNTER — HOSPITAL ENCOUNTER (OUTPATIENT)
Dept: LAB | Facility: HOSPITAL | Age: 70
Discharge: HOME | End: 2020-03-31
Attending: INTERNAL MEDICINE

## 2020-03-31 LAB
ALBUMIN SERPL-MCNC: 4.2 G/DL (ref 3.5–5)
ALBUMIN/GLOB SERPL: 1.8 {RATIO} (ref 1.4–2.6)
ALP SERPL-CCNC: 73 U/L (ref 43–160)
ALT SERPL-CCNC: 14 U/L (ref 10–40)
ANION GAP SERPL CALC-SCNC: 19 MMOL/L (ref 8–19)
AST SERPL-CCNC: 17 U/L (ref 15–50)
BASOPHILS # BLD AUTO: 0.1 10*3/UL (ref 0–0.2)
BASOPHILS NFR BLD AUTO: 1.5 % (ref 0–3)
BILIRUB SERPL-MCNC: 0.76 MG/DL (ref 0.2–1.3)
BUN SERPL-MCNC: 21 MG/DL (ref 5–25)
BUN/CREAT SERPL: 23 {RATIO} (ref 6–20)
CALCIUM SERPL-MCNC: 9.5 MG/DL (ref 8.7–10.4)
CHLORIDE SERPL-SCNC: 104 MMOL/L (ref 99–111)
CHOLEST SERPL-MCNC: 210 MG/DL (ref 107–200)
CHOLEST/HDLC SERPL: 4.2 {RATIO} (ref 3–6)
CONV ABS IMM GRAN: 0.02 10*3/UL (ref 0–0.2)
CONV CO2: 23 MMOL/L (ref 22–32)
CONV IMMATURE GRAN: 0.3 % (ref 0–1.8)
CONV TOTAL PROTEIN: 6.5 G/DL (ref 6.3–8.2)
CREAT UR-MCNC: 0.91 MG/DL (ref 0.5–0.9)
DEPRECATED RDW RBC AUTO: 37.1 FL (ref 36.4–46.3)
EOSINOPHIL # BLD AUTO: 0.32 10*3/UL (ref 0–0.7)
EOSINOPHIL # BLD AUTO: 4.7 % (ref 0–7)
ERYTHROCYTE [DISTWIDTH] IN BLOOD BY AUTOMATED COUNT: 16.3 % (ref 11.7–14.4)
GFR SERPLBLD BASED ON 1.73 SQ M-ARVRAT: >60 ML/MIN/{1.73_M2}
GLOBULIN UR ELPH-MCNC: 2.3 G/DL (ref 2–3.5)
GLUCOSE SERPL-MCNC: 97 MG/DL (ref 65–99)
HCT VFR BLD AUTO: 37.6 % (ref 37–47)
HDLC SERPL-MCNC: 50 MG/DL (ref 40–60)
HGB BLD-MCNC: 11.6 G/DL (ref 12–16)
IRON SATN MFR SERPL: 20 % (ref 20–55)
IRON SERPL-MCNC: 75 UG/DL (ref 60–170)
LDLC SERPL CALC-MCNC: 143 MG/DL (ref 70–100)
LYMPHOCYTES # BLD AUTO: 1.61 10*3/UL (ref 1–5)
LYMPHOCYTES NFR BLD AUTO: 23.6 % (ref 20–45)
MCH RBC QN AUTO: 20.5 PG (ref 27–31)
MCHC RBC AUTO-ENTMCNC: 30.9 G/DL (ref 33–37)
MCV RBC AUTO: 66.5 FL (ref 81–99)
MONOCYTES # BLD AUTO: 0.68 10*3/UL (ref 0.2–1.2)
MONOCYTES NFR BLD AUTO: 10 % (ref 3–10)
NEUTROPHILS # BLD AUTO: 4.08 10*3/UL (ref 2–8)
NEUTROPHILS NFR BLD AUTO: 59.9 % (ref 30–85)
NRBC CBCN: 0 % (ref 0–0.7)
OSMOLALITY SERPL CALC.SUM OF ELEC: 297 MOSM/KG (ref 273–304)
PLATELET # BLD AUTO: 176 10*3/UL (ref 130–400)
PMV BLD AUTO: ABNORMAL FL (ref 9.4–12.3)
POTASSIUM SERPL-SCNC: 4.2 MMOL/L (ref 3.5–5.3)
RBC # BLD AUTO: 5.65 10*6/UL (ref 4.2–5.4)
SODIUM SERPL-SCNC: 142 MMOL/L (ref 135–147)
TIBC SERPL-MCNC: 372 UG/DL (ref 245–450)
TRANSFERRIN SERPL-MCNC: 260 MG/DL (ref 250–380)
TRIGL SERPL-MCNC: 86 MG/DL (ref 40–150)
TSH SERPL-ACNC: 3.13 M[IU]/L (ref 0.27–4.2)
VLDLC SERPL-MCNC: 17 MG/DL (ref 5–37)
WBC # BLD AUTO: 6.81 10*3/UL (ref 4.8–10.8)

## 2020-05-06 ENCOUNTER — CONVERSION ENCOUNTER (OUTPATIENT)
Dept: CARDIOLOGY | Facility: CLINIC | Age: 70
End: 2020-05-06

## 2020-05-06 ENCOUNTER — OFFICE VISIT CONVERTED (OUTPATIENT)
Dept: CARDIOLOGY | Facility: CLINIC | Age: 70
End: 2020-05-06
Attending: INTERNAL MEDICINE

## 2020-09-08 ENCOUNTER — HOSPITAL ENCOUNTER (OUTPATIENT)
Dept: GENERAL RADIOLOGY | Facility: HOSPITAL | Age: 70
Discharge: HOME | End: 2020-09-08
Attending: NURSE PRACTITIONER

## 2020-09-25 ENCOUNTER — HOSPITAL ENCOUNTER (OUTPATIENT)
Dept: LAB | Facility: HOSPITAL | Age: 70
Discharge: HOME | End: 2020-09-25
Attending: INTERNAL MEDICINE

## 2020-09-25 LAB
25(OH)D3 SERPL-MCNC: 25.1 NG/ML (ref 30–100)
ALBUMIN SERPL-MCNC: 4.1 G/DL (ref 3.5–5)
ALBUMIN/GLOB SERPL: 2.1 {RATIO} (ref 1.4–2.6)
ALP SERPL-CCNC: 70 U/L (ref 43–160)
ALT SERPL-CCNC: 15 U/L (ref 10–40)
ANION GAP SERPL CALC-SCNC: 14 MMOL/L (ref 8–19)
AST SERPL-CCNC: 19 U/L (ref 15–50)
BASOPHILS # BLD AUTO: 0.06 10*3/UL (ref 0–0.2)
BASOPHILS NFR BLD AUTO: 1.3 % (ref 0–3)
BILIRUB SERPL-MCNC: 0.74 MG/DL (ref 0.2–1.3)
BUN SERPL-MCNC: 17 MG/DL (ref 5–25)
BUN/CREAT SERPL: 19 {RATIO} (ref 6–20)
CALCIUM SERPL-MCNC: 9.9 MG/DL (ref 8.7–10.4)
CHLORIDE SERPL-SCNC: 104 MMOL/L (ref 99–111)
CONV ABS IMM GRAN: 0.03 10*3/UL (ref 0–0.2)
CONV CO2: 25 MMOL/L (ref 22–32)
CONV IMMATURE GRAN: 0.6 % (ref 0–1.8)
CONV TOTAL PROTEIN: 6.1 G/DL (ref 6.3–8.2)
CREAT UR-MCNC: 0.9 MG/DL (ref 0.5–0.9)
DEPRECATED RDW RBC AUTO: 35.7 FL (ref 36.4–46.3)
EOSINOPHIL # BLD AUTO: 0.18 10*3/UL (ref 0–0.7)
EOSINOPHIL # BLD AUTO: 3.8 % (ref 0–7)
ERYTHROCYTE [DISTWIDTH] IN BLOOD BY AUTOMATED COUNT: 16 % (ref 11.7–14.4)
GFR SERPLBLD BASED ON 1.73 SQ M-ARVRAT: >60 ML/MIN/{1.73_M2}
GLOBULIN UR ELPH-MCNC: 2 G/DL (ref 2–3.5)
GLUCOSE SERPL-MCNC: 103 MG/DL (ref 65–99)
HCT VFR BLD AUTO: 34.4 % (ref 37–47)
HGB BLD-MCNC: 10.5 G/DL (ref 12–16)
LYMPHOCYTES # BLD AUTO: 1.08 10*3/UL (ref 1–5)
LYMPHOCYTES NFR BLD AUTO: 22.7 % (ref 20–45)
MCH RBC QN AUTO: 19.9 PG (ref 27–31)
MCHC RBC AUTO-ENTMCNC: 30.5 G/DL (ref 33–37)
MCV RBC AUTO: 65.3 FL (ref 81–99)
MONOCYTES # BLD AUTO: 0.61 10*3/UL (ref 0.2–1.2)
MONOCYTES NFR BLD AUTO: 12.8 % (ref 3–10)
NEUTROPHILS # BLD AUTO: 2.79 10*3/UL (ref 2–8)
NEUTROPHILS NFR BLD AUTO: 58.8 % (ref 30–85)
NRBC CBCN: 0 % (ref 0–0.7)
OSMOLALITY SERPL CALC.SUM OF ELEC: 290 MOSM/KG (ref 273–304)
PLATELET # BLD AUTO: 197 10*3/UL (ref 130–400)
PMV BLD AUTO: 11.6 FL (ref 9.4–12.3)
POTASSIUM SERPL-SCNC: 4 MMOL/L (ref 3.5–5.3)
RBC # BLD AUTO: 5.27 10*6/UL (ref 4.2–5.4)
SODIUM SERPL-SCNC: 139 MMOL/L (ref 135–147)
WBC # BLD AUTO: 4.75 10*3/UL (ref 4.8–10.8)

## 2020-10-20 ENCOUNTER — HOSPITAL ENCOUNTER (OUTPATIENT)
Dept: GENERAL RADIOLOGY | Facility: HOSPITAL | Age: 70
Discharge: HOME | End: 2020-10-20
Attending: INTERNAL MEDICINE

## 2020-11-11 ENCOUNTER — OFFICE VISIT CONVERTED (OUTPATIENT)
Dept: CARDIOLOGY | Facility: CLINIC | Age: 70
End: 2020-11-11
Attending: INTERNAL MEDICINE

## 2020-12-03 ENCOUNTER — HOSPITAL ENCOUNTER (OUTPATIENT)
Dept: GENERAL RADIOLOGY | Facility: HOSPITAL | Age: 70
Discharge: HOME | End: 2020-12-03
Attending: NURSE PRACTITIONER

## 2020-12-03 ENCOUNTER — HOSPITAL ENCOUNTER (OUTPATIENT)
Dept: LAB | Facility: HOSPITAL | Age: 70
Discharge: HOME | End: 2020-12-03
Attending: INTERNAL MEDICINE

## 2020-12-03 LAB
CREAT BLD-MCNC: 0.8 MG/DL (ref 0.6–1.4)
GFR SERPLBLD BASED ON 1.73 SQ M-ARVRAT: >60 ML/MIN/{1.73_M2}

## 2020-12-04 LAB
T4 FREE SERPL-MCNC: 1 NG/DL (ref 0.9–1.8)
TSH SERPL-ACNC: 4.74 M[IU]/L (ref 0.27–4.2)

## 2021-01-28 ENCOUNTER — HOSPITAL ENCOUNTER (OUTPATIENT)
Dept: GENERAL RADIOLOGY | Facility: HOSPITAL | Age: 71
Discharge: HOME | End: 2021-01-28
Attending: INTERNAL MEDICINE

## 2021-01-28 ENCOUNTER — HOSPITAL ENCOUNTER (OUTPATIENT)
Dept: LAB | Facility: HOSPITAL | Age: 71
Discharge: HOME | End: 2021-01-28
Attending: NURSE PRACTITIONER

## 2021-01-28 LAB
ALBUMIN SERPL-MCNC: 4.4 G/DL (ref 3.5–5)
ALBUMIN/GLOB SERPL: 1.8 {RATIO} (ref 1.4–2.6)
ALP SERPL-CCNC: 105 U/L (ref 43–160)
ALT SERPL-CCNC: 11 U/L (ref 10–40)
ANION GAP SERPL CALC-SCNC: 14 MMOL/L (ref 8–19)
AST SERPL-CCNC: 19 U/L (ref 15–50)
BASOPHILS # BLD AUTO: 0.12 10*3/UL (ref 0–0.2)
BASOPHILS NFR BLD AUTO: 1.7 % (ref 0–3)
BILIRUB SERPL-MCNC: 0.53 MG/DL (ref 0.2–1.3)
BUN SERPL-MCNC: 26 MG/DL (ref 5–25)
BUN/CREAT SERPL: 26 {RATIO} (ref 6–20)
CALCIUM SERPL-MCNC: 9.3 MG/DL (ref 8.7–10.4)
CHLORIDE SERPL-SCNC: 104 MMOL/L (ref 99–111)
CONV ABS IMM GRAN: 0.03 10*3/UL (ref 0–0.2)
CONV CO2: 25 MMOL/L (ref 22–32)
CONV IMMATURE GRAN: 0.4 % (ref 0–1.8)
CONV TOTAL PROTEIN: 6.8 G/DL (ref 6.3–8.2)
CREAT UR-MCNC: 1.01 MG/DL (ref 0.5–0.9)
CRP SERPL-MCNC: <3 MG/L (ref 0–5)
DEPRECATED RDW RBC AUTO: 42.6 FL (ref 36.4–46.3)
EOSINOPHIL # BLD AUTO: 0.37 10*3/UL (ref 0–0.7)
EOSINOPHIL # BLD AUTO: 5.1 % (ref 0–7)
ERYTHROCYTE [DISTWIDTH] IN BLOOD BY AUTOMATED COUNT: 20.1 % (ref 11.7–14.4)
GFR SERPLBLD BASED ON 1.73 SQ M-ARVRAT: 56 ML/MIN/{1.73_M2}
GLOBULIN UR ELPH-MCNC: 2.4 G/DL (ref 2–3.5)
GLUCOSE SERPL-MCNC: 98 MG/DL (ref 65–99)
HCT VFR BLD AUTO: 35 % (ref 37–47)
HGB BLD-MCNC: 10.7 G/DL (ref 12–16)
LYMPHOCYTES # BLD AUTO: 1.05 10*3/UL (ref 1–5)
LYMPHOCYTES NFR BLD AUTO: 14.4 % (ref 20–45)
MCH RBC QN AUTO: 19.8 PG (ref 27–31)
MCHC RBC AUTO-ENTMCNC: 30.6 G/DL (ref 33–37)
MCV RBC AUTO: 64.7 FL (ref 81–99)
MONOCYTES # BLD AUTO: 0.76 10*3/UL (ref 0.2–1.2)
MONOCYTES NFR BLD AUTO: 10.5 % (ref 3–10)
NEUTROPHILS # BLD AUTO: 4.94 10*3/UL (ref 2–8)
NEUTROPHILS NFR BLD AUTO: 67.9 % (ref 30–85)
NRBC CBCN: 0 % (ref 0–0.7)
OSMOLALITY SERPL CALC.SUM OF ELEC: 291 MOSM/KG (ref 273–304)
PLATELET # BLD AUTO: 201 10*3/UL (ref 130–400)
PMV BLD AUTO: ABNORMAL FL (ref 9.4–12.3)
POTASSIUM SERPL-SCNC: 4.6 MMOL/L (ref 3.5–5.3)
RBC # BLD AUTO: 5.41 10*6/UL (ref 4.2–5.4)
SODIUM SERPL-SCNC: 138 MMOL/L (ref 135–147)
WBC # BLD AUTO: 7.27 10*3/UL (ref 4.8–10.8)

## 2021-02-02 ENCOUNTER — OFFICE VISIT CONVERTED (OUTPATIENT)
Dept: GASTROENTEROLOGY | Facility: CLINIC | Age: 71
End: 2021-02-02
Attending: NURSE PRACTITIONER

## 2021-02-02 ENCOUNTER — CONVERSION ENCOUNTER (OUTPATIENT)
Dept: GASTROENTEROLOGY | Facility: CLINIC | Age: 71
End: 2021-02-02

## 2021-02-26 ENCOUNTER — HOSPITAL ENCOUNTER (OUTPATIENT)
Dept: PREADMISSION TESTING | Facility: HOSPITAL | Age: 71
Discharge: HOME | End: 2021-02-26
Attending: INTERNAL MEDICINE

## 2021-02-26 LAB — SARS-COV-2 RNA SPEC QL NAA+PROBE: NOT DETECTED

## 2021-03-02 ENCOUNTER — HOSPITAL ENCOUNTER (OUTPATIENT)
Dept: VACCINE CLINIC | Facility: HOSPITAL | Age: 71
Discharge: HOME | End: 2021-03-02
Attending: INTERNAL MEDICINE

## 2021-03-03 ENCOUNTER — HOSPITAL ENCOUNTER (OUTPATIENT)
Dept: GASTROENTEROLOGY | Facility: HOSPITAL | Age: 71
Setting detail: HOSPITAL OUTPATIENT SURGERY
Discharge: HOME | End: 2021-03-03
Attending: INTERNAL MEDICINE

## 2021-03-22 ENCOUNTER — HOSPITAL ENCOUNTER (OUTPATIENT)
Dept: LAB | Facility: HOSPITAL | Age: 71
Discharge: HOME | End: 2021-03-22
Attending: INTERNAL MEDICINE

## 2021-03-22 LAB
ALBUMIN SERPL-MCNC: 4.1 G/DL (ref 3.5–5)
ALBUMIN/GLOB SERPL: 1.6 {RATIO} (ref 1.4–2.6)
ALP SERPL-CCNC: 81 U/L (ref 43–160)
ALT SERPL-CCNC: 15 U/L (ref 10–40)
ANION GAP SERPL CALC-SCNC: 12 MMOL/L (ref 8–19)
AST SERPL-CCNC: 16 U/L (ref 15–50)
BASOPHILS # BLD AUTO: 0.1 10*3/UL (ref 0–0.2)
BASOPHILS NFR BLD AUTO: 1.5 % (ref 0–3)
BILIRUB SERPL-MCNC: 0.59 MG/DL (ref 0.2–1.3)
BUN SERPL-MCNC: 16 MG/DL (ref 5–25)
BUN/CREAT SERPL: 17 {RATIO} (ref 6–20)
CALCIUM SERPL-MCNC: 9.6 MG/DL (ref 8.7–10.4)
CHLORIDE SERPL-SCNC: 103 MMOL/L (ref 99–111)
CONV ABS IMM GRAN: 0.04 10*3/UL (ref 0–0.2)
CONV CO2: 28 MMOL/L (ref 22–32)
CONV IMMATURE GRAN: 0.6 % (ref 0–1.8)
CONV TOTAL PROTEIN: 6.6 G/DL (ref 6.3–8.2)
CREAT UR-MCNC: 0.94 MG/DL (ref 0.5–0.9)
DEPRECATED RDW RBC AUTO: 38.3 FL (ref 36.4–46.3)
EOSINOPHIL # BLD AUTO: 0.23 10*3/UL (ref 0–0.7)
EOSINOPHIL # BLD AUTO: 3.5 % (ref 0–7)
ERYTHROCYTE [DISTWIDTH] IN BLOOD BY AUTOMATED COUNT: 17.3 % (ref 11.7–14.4)
GFR SERPLBLD BASED ON 1.73 SQ M-ARVRAT: >60 ML/MIN/{1.73_M2}
GLOBULIN UR ELPH-MCNC: 2.5 G/DL (ref 2–3.5)
GLUCOSE SERPL-MCNC: 102 MG/DL (ref 65–99)
HCT VFR BLD AUTO: 37 % (ref 37–47)
HGB BLD-MCNC: 11.4 G/DL (ref 12–16)
IRON SATN MFR SERPL: 17 % (ref 20–55)
IRON SERPL-MCNC: 59 UG/DL (ref 60–170)
LYMPHOCYTES # BLD AUTO: 1.39 10*3/UL (ref 1–5)
LYMPHOCYTES NFR BLD AUTO: 20.9 % (ref 20–45)
MCH RBC QN AUTO: 20.4 PG (ref 27–31)
MCHC RBC AUTO-ENTMCNC: 30.8 G/DL (ref 33–37)
MCV RBC AUTO: 66.2 FL (ref 81–99)
MONOCYTES # BLD AUTO: 0.62 10*3/UL (ref 0.2–1.2)
MONOCYTES NFR BLD AUTO: 9.3 % (ref 3–10)
NEUTROPHILS # BLD AUTO: 4.28 10*3/UL (ref 2–8)
NEUTROPHILS NFR BLD AUTO: 64.2 % (ref 30–85)
NRBC CBCN: 0 % (ref 0–0.7)
OSMOLALITY SERPL CALC.SUM OF ELEC: 289 MOSM/KG (ref 273–304)
PLATELET # BLD AUTO: 200 10*3/UL (ref 130–400)
PMV BLD AUTO: ABNORMAL FL (ref 9.4–12.3)
POTASSIUM SERPL-SCNC: 4.2 MMOL/L (ref 3.5–5.3)
RBC # BLD AUTO: 5.59 10*6/UL (ref 4.2–5.4)
SODIUM SERPL-SCNC: 139 MMOL/L (ref 135–147)
T4 FREE SERPL-MCNC: 1.2 NG/DL (ref 0.9–1.8)
TIBC SERPL-MCNC: 352 UG/DL (ref 245–450)
TRANSFERRIN SERPL-MCNC: 246 MG/DL (ref 250–380)
TSH SERPL-ACNC: 3.51 M[IU]/L (ref 0.27–4.2)
WBC # BLD AUTO: 6.66 10*3/UL (ref 4.8–10.8)

## 2021-03-23 ENCOUNTER — HOSPITAL ENCOUNTER (OUTPATIENT)
Dept: VACCINE CLINIC | Facility: HOSPITAL | Age: 71
Discharge: HOME | End: 2021-03-23
Attending: INTERNAL MEDICINE

## 2021-03-31 ENCOUNTER — HOSPITAL ENCOUNTER (OUTPATIENT)
Dept: LAB | Facility: HOSPITAL | Age: 71
Discharge: HOME | End: 2021-03-31
Attending: NURSE PRACTITIONER

## 2021-03-31 LAB
ALBUMIN SERPL-MCNC: 4.5 G/DL (ref 3.5–5)
ALBUMIN/GLOB SERPL: 2 {RATIO} (ref 1.4–2.6)
ALP SERPL-CCNC: 83 U/L (ref 43–160)
ALT SERPL-CCNC: 37 U/L (ref 10–40)
ANION GAP SERPL CALC-SCNC: 18 MMOL/L (ref 8–19)
AST SERPL-CCNC: 38 U/L (ref 15–50)
BASOPHILS # BLD AUTO: 0.06 10*3/UL (ref 0–0.2)
BASOPHILS NFR BLD AUTO: 0.9 % (ref 0–3)
BILIRUB SERPL-MCNC: 0.55 MG/DL (ref 0.2–1.3)
BUN SERPL-MCNC: 22 MG/DL (ref 5–25)
BUN/CREAT SERPL: 24 {RATIO} (ref 6–20)
CALCIUM SERPL-MCNC: 9.3 MG/DL (ref 8.7–10.4)
CHLORIDE SERPL-SCNC: 105 MMOL/L (ref 99–111)
CONV ABS IMM GRAN: 0.02 10*3/UL (ref 0–0.2)
CONV CO2: 21 MMOL/L (ref 22–32)
CONV IMMATURE GRAN: 0.3 % (ref 0–1.8)
CONV TOTAL PROTEIN: 6.8 G/DL (ref 6.3–8.2)
CREAT UR-MCNC: 0.91 MG/DL (ref 0.5–0.9)
CRP SERPL-MCNC: 4.3 MG/L (ref 0–5)
DEPRECATED RDW RBC AUTO: 36.9 FL (ref 36.4–46.3)
EOSINOPHIL # BLD AUTO: 0.23 10*3/UL (ref 0–0.7)
EOSINOPHIL # BLD AUTO: 3.5 % (ref 0–7)
ERYTHROCYTE [DISTWIDTH] IN BLOOD BY AUTOMATED COUNT: 16.4 % (ref 11.7–14.4)
GFR SERPLBLD BASED ON 1.73 SQ M-ARVRAT: >60 ML/MIN/{1.73_M2}
GLOBULIN UR ELPH-MCNC: 2.3 G/DL (ref 2–3.5)
GLUCOSE SERPL-MCNC: 97 MG/DL (ref 65–99)
HCT VFR BLD AUTO: 33.6 % (ref 37–47)
HGB BLD-MCNC: 10.5 G/DL (ref 12–16)
LYMPHOCYTES # BLD AUTO: 0.96 10*3/UL (ref 1–5)
LYMPHOCYTES NFR BLD AUTO: 14.7 % (ref 20–45)
MCH RBC QN AUTO: 20 PG (ref 27–31)
MCHC RBC AUTO-ENTMCNC: 31.3 G/DL (ref 33–37)
MCV RBC AUTO: 64 FL (ref 81–99)
MONOCYTES # BLD AUTO: 0.56 10*3/UL (ref 0.2–1.2)
MONOCYTES NFR BLD AUTO: 8.6 % (ref 3–10)
NEUTROPHILS # BLD AUTO: 4.71 10*3/UL (ref 2–8)
NEUTROPHILS NFR BLD AUTO: 72 % (ref 30–85)
NRBC CBCN: 0 % (ref 0–0.7)
OSMOLALITY SERPL CALC.SUM OF ELEC: 291 MOSM/KG (ref 273–304)
PLATELET # BLD AUTO: 249 10*3/UL (ref 130–400)
PMV BLD AUTO: 11.3 FL (ref 9.4–12.3)
POTASSIUM SERPL-SCNC: 4.5 MMOL/L (ref 3.5–5.3)
RBC # BLD AUTO: 5.25 10*6/UL (ref 4.2–5.4)
SODIUM SERPL-SCNC: 139 MMOL/L (ref 135–147)
WBC # BLD AUTO: 6.54 10*3/UL (ref 4.8–10.8)

## 2021-04-19 ENCOUNTER — HOSPITAL ENCOUNTER (OUTPATIENT)
Dept: URGENT CARE | Facility: CLINIC | Age: 71
Discharge: HOME | End: 2021-04-19
Attending: EMERGENCY MEDICINE

## 2021-04-28 ENCOUNTER — HOSPITAL ENCOUNTER (OUTPATIENT)
Dept: PHYSICAL THERAPY | Facility: CLINIC | Age: 71
Setting detail: RECURRING SERIES
Discharge: HOME | End: 2021-05-20
Attending: INTERNAL MEDICINE

## 2021-05-10 ENCOUNTER — HOSPITAL ENCOUNTER (OUTPATIENT)
Dept: LAB | Facility: HOSPITAL | Age: 71
Discharge: HOME | End: 2021-05-10
Attending: INTERNAL MEDICINE

## 2021-05-10 LAB
ALBUMIN SERPL-MCNC: 3.9 G/DL (ref 3.5–5)
ALBUMIN/GLOB SERPL: 1.4 {RATIO} (ref 1.4–2.6)
ALP SERPL-CCNC: 61 U/L (ref 43–160)
ALT SERPL-CCNC: 16 U/L (ref 10–40)
AMPHETAMINES UR QL SCN: NEGATIVE
ANION GAP SERPL CALC-SCNC: 16 MMOL/L (ref 8–19)
AST SERPL-CCNC: 17 U/L (ref 15–50)
BARBITURATES UR QL SCN: NEGATIVE
BASOPHILS # BLD AUTO: 0.07 10*3/UL (ref 0–0.2)
BASOPHILS NFR BLD AUTO: 0.7 % (ref 0–3)
BENZODIAZ UR QL SCN: NEGATIVE
BILIRUB SERPL-MCNC: 0.69 MG/DL (ref 0.2–1.3)
BUN SERPL-MCNC: 12 MG/DL (ref 5–25)
BUN/CREAT SERPL: 15 {RATIO} (ref 6–20)
CALCIUM SERPL-MCNC: 9.7 MG/DL (ref 8.7–10.4)
CHLORIDE SERPL-SCNC: 99 MMOL/L (ref 99–111)
CONV ABS IMM GRAN: 0.12 10*3/UL (ref 0–0.2)
CONV CO2: 24 MMOL/L (ref 22–32)
CONV COCAINE, UR: NEGATIVE
CONV IMMATURE GRAN: 1.2 % (ref 0–1.8)
CONV TOTAL PROTEIN: 6.6 G/DL (ref 6.3–8.2)
CREAT UR-MCNC: 0.81 MG/DL (ref 0.5–0.9)
DEPRECATED RDW RBC AUTO: 36 FL (ref 36.4–46.3)
EOSINOPHIL # BLD AUTO: 0.97 10*3/UL (ref 0–0.7)
EOSINOPHIL # BLD AUTO: 9.7 % (ref 0–7)
ERYTHROCYTE [DISTWIDTH] IN BLOOD BY AUTOMATED COUNT: 17.2 % (ref 11.7–14.4)
FOLATE SERPL-MCNC: >20 NG/ML (ref 4.8–20)
GFR SERPLBLD BASED ON 1.73 SQ M-ARVRAT: >60 ML/MIN/{1.73_M2}
GLOBULIN UR ELPH-MCNC: 2.7 G/DL (ref 2–3.5)
GLUCOSE SERPL-MCNC: 103 MG/DL (ref 65–99)
HCT VFR BLD AUTO: 31 % (ref 37–47)
HGB BLD-MCNC: 9.6 G/DL (ref 12–16)
IRON SATN MFR SERPL: 9 % (ref 20–55)
IRON SERPL-MCNC: 26 UG/DL (ref 60–170)
LYMPHOCYTES # BLD AUTO: 0.87 10*3/UL (ref 1–5)
LYMPHOCYTES NFR BLD AUTO: 8.7 % (ref 20–45)
MCH RBC QN AUTO: 19.6 PG (ref 27–31)
MCHC RBC AUTO-ENTMCNC: 31 G/DL (ref 33–37)
MCV RBC AUTO: 63.1 FL (ref 81–99)
METHADONE UR QL SCN: NEGATIVE
MONOCYTES # BLD AUTO: 0.78 10*3/UL (ref 0.2–1.2)
MONOCYTES NFR BLD AUTO: 7.8 % (ref 3–10)
NEUTROPHILS # BLD AUTO: 7.22 10*3/UL (ref 2–8)
NEUTROPHILS NFR BLD AUTO: 71.9 % (ref 30–85)
NRBC CBCN: 0.2 % (ref 0–0.7)
OPIATES TESTED UR SCN: POSITIVE
OSMOLALITY SERPL CALC.SUM OF ELEC: 280 MOSM/KG (ref 273–304)
OXYCODONE UR QL SCN: NEGATIVE
PCP UR QL: NEGATIVE
PLATELET # BLD AUTO: 259 10*3/UL (ref 130–400)
PMV BLD AUTO: 11.2 FL (ref 9.4–12.3)
POTASSIUM SERPL-SCNC: 4.1 MMOL/L (ref 3.5–5.3)
RBC # BLD AUTO: 4.91 10*6/UL (ref 4.2–5.4)
SODIUM SERPL-SCNC: 135 MMOL/L (ref 135–147)
THC SERPLBLD CFM-MCNC: NEGATIVE NG/ML
TIBC SERPL-MCNC: 279 UG/DL (ref 245–450)
TRANSFERRIN SERPL-MCNC: 195 MG/DL (ref 250–380)
TSH SERPL-ACNC: 1.77 M[IU]/L (ref 0.27–4.2)
VIT B12 SERPL-MCNC: 1521 PG/ML (ref 211–911)
WBC # BLD AUTO: 10.03 10*3/UL (ref 4.8–10.8)

## 2021-05-11 ENCOUNTER — HOSPITAL ENCOUNTER (OUTPATIENT)
Dept: GENERAL RADIOLOGY | Facility: HOSPITAL | Age: 71
Discharge: HOME | End: 2021-05-11
Attending: INTERNAL MEDICINE

## 2021-05-13 NOTE — PROGRESS NOTES
"   Progress Note      Patient Name: Suzanna Santos   Patient ID: [id number]   Sex: Female   YOB: 1950    Primary Care Provider: Joshua Lee MD   Referring Provider: Joshua Lee MD    Visit Date: November 11, 2020    Provider: Dawit Shook MD   Location: [facility]   Location Address: [address] [place] [address], [facility]   [place], [place]  [id number]   Location Phone: [phone]          Chief Complaint  · Paroxysmal atrial fibrillation   · SVT      History Of Present Illness  REFERRING CARE PROVIDER: Joshua Lee MD   Suzanna Santos is a 69 year old /White female with paroxysmal atrial fibrillation, previous ablation procedure, sick sinus syndrome with dual-chamber pacemaker, who has been doing well. No recurrent issues or problems. Occasionally she does have a heart rate going up into the 100s.   PAST MEDICAL HISTORY: Paroxysmal atrial fibrillation and flutter, s/p ablation procedure and pacemaker implantation; hypertension; diastolic congestive heart failure.   PSYCHOSOCIAL HISTORY: She never used alcohol or tobacco.   CURRENT MEDICATIONS: include Eliquis 5 mg b.i.d.; Metoprolol Tartrate 25 mg b.i.d.; Celebrex 200 mg daily; Olmesartan 40 mg daily; Prilosec; Pepcid; Vitamin B12; turmeric; Lasix 20 mg daily; Vitamin D3; Symbicort. The dosage and frequency of the medications were reviewed with the patient.       Review of Systems  · Cardiovascular  o Admits  o : palpitations (fast, fluttering, or skipping beats), swelling (feet, ankles, hands), shortness of breath while walking or lying flat  o Denies  o : chest pain or angina pectoris   · Respiratory  o Admits  o : chronic or frequent cough      Vitals  Date Time BP Position Site L\R Cuff Size HR RR TEMP (F) WT  HT  BMI kg/m2 BSA m2 O2 Sat FR L/min FiO2 HC       11/11/2020 10:33 /80 Sitting    98 - R   187lbs 0oz 5'  6\" 30.18 1.99       11/11/2020 10:33 /92 Sitting               "         Physical Examination  · Constitutional  o Appearance  o : Awake, alert, in no acute distress.   · Eyes  o Conjunctivae  o : Normal.  · Ears, Nose, Mouth and Throat  o Oral Cavity  o :   § Oral Mucosa  § : Normal.  · Neck  o Inspection/Palpation  o : No JVD. Good carotid upstroke. No thyromegaly.  · Respiratory  o Respiratory  o : Good respiratory effort. Clear to percussion and auscultation.  · Cardiovascular  o Heart  o :   § Auscultation of Heart  § : S1, S2 normal. Regular rate and rhythm without murmurs, gallops, or rubs.  o Peripheral Vascular System  o :   § Extremities  § : Good femoral and pedal pulses. No pedal edema.  · Gastrointestinal  o Abdominal Examination  o : Soft. No tenderness or masses felt. No hepatosplenomegaly. Abdominal aorta is not palpable.     Her dual-chamber pacemaker was interrogated.  The right atrial lead was paced 6% of the time and working normally.  The right ventricular lead was paced 8% of the time and working normally.  She had 7 short sinus tachycardia episodes.  No changes were made to the device.    Hemoglobin was 10.5, creatinine 0.9.           Assessment     ASSESSMENT AND PLAN:    1.  Paroxysmal atrial fibrillation with previous ablation:  Maintaining normal sinus rhythm and is on Eliquis for        CVA prevention.  2.  Sick sinus syndrome with bradycardia and dual-chamber pacemaker, working properly:  Repeat        interrogation in 6 months.    MD Neeta Curtis/tereza         This note was transcribed by Violet Szymanski.  [email or web address]/[email or web address]   The above service was transcribed by Violet Szymanski, and I attest to the accuracy of the note.  NEETA.               Electronically Signed by: Violet Szymanski-, -Author on November 16, 2020 10:32:34 AM  Electronically Co-signed by: Dawit Shook MD -Reviewer on November 18, 2020 04:07:22 PM

## 2021-05-13 NOTE — PROGRESS NOTES
Progress Note      Patient Name: Suzanna Santos   Patient ID: [id number]   Sex: Female   YOB: 1950    Primary Care Provider: Joshua Lee MD   Referring Provider: Joshua Lee MD    Visit Date: May 6, 2020    Provider: Dawit Shook MD   Location: [facility]   Location Address: [address] [place] [address], [facility]   [place], [place]  [id number]   Location Phone: [phone]          Chief Complaint  · Paroxysmal atrial fibrillation   · Bradycardia       History Of Present Illness  REFERRING CARE PROVIDER: Joshua Lee MD   Suzanna Santos is a 69 year old /White female with known paroxysmal atrial fibrillation with previous ablation procedures as well as sick sinus syndrome with permanent pacemaker implantation. She has had relatively few symptoms. Occasional palpitations and tachycardia spells but brief in duration, much better.   PAST MEDICAL HISTORY: Positive for paroxysmal atrial fibrillation with ablation procedure as well as pacemaker implantation.   FAMILY HISTORY: Positive for diabetes and hypertension. Negative for heart disease.   PSYCHOSOCIAL HISTORY: No history of mood changes or depression. She never used alcohol or tobacco.   CURRENT MEDICATIONS: include Eliquis 5 mg b.i.d.; Metoprolol 25 mg b.i.d; Celebrex 200 mg daily; Lasix 20 mg daily; Prilosec 20 mg daily; Valsartan 80 mg qhs. The dosage and frequency of the medications were reviewed with the patient.       Review of Systems  · Cardiovascular  o Admits  o : palpitations (fast, fluttering, or skipping beats), swelling (feet, ankles, hands), shortness of breath while walking or lying flat  o Denies  o : chest pain or angina pectoris   · Respiratory  o Admits  o : asthma or wheezing  o Denies  o : chronic or frequent cough      Vitals  Date Time BP Position Site L\R Cuff Size HR RR TEMP (F) WT  HT  BMI kg/m2 BSA m2 O2 Sat HC       05/06/2020 10:01 /82 Sitting    66 - R    "182lbs 0oz 5'  6\" 29.38 1.96     05/06/2020 10:01 /76 Sitting                     Physical Examination  · Constitutional  o Appearance  o : Awake, alert, in no acute distress.   · Eyes  o Conjunctivae  o : Normal.  · Ears, Nose, Mouth and Throat  o Oral Cavity  o :   § Oral Mucosa  § : Normal.  · Neck  o Inspection/Palpation  o : No JVD. Good carotid upstroke. No thyromegaly.  · Respiratory  o Respiratory  o : Good respiratory effort. Clear to percussion and auscultation.  · Cardiovascular  o Heart  o :   § Auscultation of Heart  § : S1, S2 normal. Regular rate and rhythm without murmurs, gallops, or rubs.  o Peripheral Vascular System  o :   § Extremities  § : Good femoral and pedal pulses. No pedal edema.  · Gastrointestinal  o Abdominal Examination  o : Soft. No tenderness or masses felt. No hepatosplenomegaly. Abdominal aorta is not palpable.     Hemoglobin was 11.6.  Creatinine was 0.91.  LDL cholesterol was 143, HDL 50.    Interrogation of her dual-chamber pacemaker showed the right atrial lead was paced 10% of the time and working normally.  The right ventricular lead was paced 18% of the time and working normally.  She did have 425 episodes of atrial tachycardia, the longest of which was 3 hours 12 minutes in duration.           Assessment     ASSESSMENT AND PLAN:    1.  Paroxysmal atrial fibrillation, s/p ablation:  Primarily maintaining normal sinus rhythm.  Continue with        current medications.  She is on Eliquis for CVA prevention.  2.  Bradycardia with dual-chamber pacemaker working properly:  Repeat interrogation in 6 months.    MD Neeta Curtis/tereza         This note was transcribed by Violet Szymanski.  [email or web address]   The above service was transcribed by Violet Szymanski, and I attest to the accuracy of the note.  NEETA.               Electronically Signed by: Violet Szymanski-, -Author on May 13, 2020 12:11:04 PM  Electronically Co-signed by: Dawit Shook MD -Reviewer on May 13, 2020 12:15:14 " PM

## 2021-05-14 ENCOUNTER — HOSPITAL ENCOUNTER (OUTPATIENT)
Dept: GENERAL RADIOLOGY | Facility: HOSPITAL | Age: 71
Discharge: HOME | End: 2021-05-14
Attending: INTERNAL MEDICINE

## 2021-05-14 VITALS
BODY MASS INDEX: 30.05 KG/M2 | HEART RATE: 98 BPM | DIASTOLIC BLOOD PRESSURE: 80 MMHG | WEIGHT: 187 LBS | SYSTOLIC BLOOD PRESSURE: 146 MMHG | HEIGHT: 66 IN

## 2021-05-14 VITALS
HEART RATE: 73 BPM | DIASTOLIC BLOOD PRESSURE: 85 MMHG | TEMPERATURE: 98 F | BODY MASS INDEX: 30.29 KG/M2 | WEIGHT: 188.5 LBS | SYSTOLIC BLOOD PRESSURE: 180 MMHG | HEIGHT: 66 IN

## 2021-05-14 NOTE — PROGRESS NOTES
"   Progress Note      Patient Name: Suzanna Santos   Patient ID: [id number]   Sex: Female   YOB: 1950    Primary Care Provider: Joshua Lee MD   Referring Provider: Joshua Lee MD    Visit Date: February 2, 2021    Provider: ROSHAN Clark   Location: [facility] [address]   Location Address: [address] [place] [address]  [id number]   Location Phone: [phone]          Chief Complaint  · follow up       History Of Present Illness  Suzanna Santos is a 70 year old /White female who presents to the office today.      Pt has not been seen in office since 2018. She has history of persistent heartburn, dysphagia, and a pancreatic cyst.  Last EGD was in 2018 showed a medium-sized hiatal hernia, grade B esophagitis, normal stomach and normal duodenum.  Patient had EUS in 2017 that showed fatty infiltration of the pancreas and no mass or lesion seen.  MRI MRCP last done October 2018 showed interval increase in size of the cyst at the junction of the body and tail of the pancreas measuring 6 mm, stable prominence of the common duct without evidence for an obstructing mass or stone.    Repeat CT abdomen with and without contrast May 2019 showed 10 mm hypodense lesion at the junction of the head and neck of the pancreas is stable  CT the abdomen with and without contrast 12/3/2020: No suspicious pancreatic masses, \"previously noted 3 mm cyst in the body of the pancreas is not visualized on this exam.\"  There is some focal fat at the head of the pancreas.  6 mm nonobstructing left renal stone, small hiatal hernia. Read by DR Anderson.       Pt states she required a couple of heart ablations and pacemaker  in 2018. Occasionally has acid reflux sx at night, may be r/t night time snacking. Taking Omeprazole in the day and Pepcid at night. c/o food feeling stuck in throat w meals, denies coughing/choking.   Pt states she didn't do colonoscopy in 2020 d/t " COVID   Has had loose stools several times/d for maybe a year. No blood in stool or black stool, no unint wt loss. NO abd pain.     Cardio-Dr Shook       Past Medical History  Acid reflux; Anemia, Unspecified; Arthritis; Asthma; Cervicalgia; Degenerative Disc Disease ; Heart Disease; Hypertension; Hypertension, Benign Essential; Lumbago/low back pain; Muscle cramps; Neuropathy; Reflux; Shortness of Breath; Thyroid disease; Thyroid disorder         Past Surgical History  Artificial Joints/Limbs; Cholecystecomy; Colonoscopy; EGD; Gallbladder; heart surgery; Hysterectomy; Joint Surgery; Surgical Clips; Tonsillectomy         Medication List  Name Date Started Instructions   baclofen 10 mg oral tablet  take 1 tablet (10 mg) by oral route 3 times per day   Celebrex 200 mg oral capsule  take 1 capsule (200 mg) by oral route once daily   Eliquis 5 mg oral tablet 12/28/2020 TAKE 1 TABLET TWICE A DAY   Fish Oil oral  --    Lasix 20 mg oral tablet 12/21/2020 take 1 tablet (20 mg) by oral route once daily   metoprolol succinate 25 mg oral tablet extended release 24 hr  take 1 tablet by oral route 2 times a day   olmesartan 40 mg oral tablet 11/02/2020 take 1 tablet (40 mg) by oral route once daily   Pepcid AC 10 mg oral tablet  take 1 tablet (10 mg) by oral route once daily as needed   Prilosec OTC 20 mg oral tablet,delayed release (/EC)  take 1 tablet by oral route daily   Tums 200 mg calcium (500 mg) oral tablet,chewable  chew 1 tablet by oral route As needed   Vitamin B-12 250 mcg oral tablet  take 1 tablet by oral route daily         Allergy List  doxycycline hyclate; doxycycline monohydrate; lisinopril; Talwin         Family Medical History  Disease Name Relative/Age Notes   Rectal Neoplasm, Malignant Brother/30   --    Heart Disease Brother/  Father/  Mother/   Father; Mother; Brother  Father; Mother   Diabetes, unspecified type Brother/  Father/   Father; Brother   Family history of rectal cancer Brother/30   --   "  Family history of certain chronic disabling diseases; arthritis Brother/  Mother/   Mother; Brother   No family history of colorectal cancer  --    Family history of Arthritis Brother/  Mother/   Mother; Brother   Family history of heart disease Brother/  Father/  Mother/   Mother; Father; Brother   Family history of diabetes mellitus Brother/  Father/   Father; Brother         Social History  Alcohol (Never); Alcohol Use (Never); lives with spouse; ; .; Recreational Drug Use (Never); Retired; Retired.; Tobacco (Never)         Review of Systems  · Constitutional  o Admits  o : good general health lately, no acute distress  · Gastrointestinal  o Denies  o : additional gastrointestinal symptoms except as noted in the HPI  · Psychiatric  o Admits  o : pleasant affect      Vitals  Date Time BP Position Site L\R Cuff Size HR RR TEMP (F) WT  HT  BMI kg/m2 BSA m2 O2 Sat FR L/min FiO2 HC       02/02/2021 09:00 /85 Sitting    73 - R  98 188lbs 8oz 5'  6\" 30.42 2             Physical Examination  · Constitutional  o Appearance  o : well developed, well-nourished, in no acute distress  · Head and Face  o Head  o :   § Inspection  § : atraumatic, normocephalic  · Eyes  o Sclerae  o : sclerae white, no sclerae icterus  · Neck  o Inspection/Palpation  o : supple  · Respiratory  o Respiratory Effort  o : breathing unlabored  o Inspection of Chest  o : normal appearance, no retractions  · Cardiovascular  o Peripheral Vascular System  o :   § Extremities  § : no cyanosis, clubbing or edema  · Gastrointestinal  o Abdominal Examination  o : soft, nontender to palpation  · Skin and Subcutaneous Tissue  o General Inspection  o : no lesions present, no rashes present  · Neurologic  o Mental Status Examination  o :   § Orientation  § : grossly oriented to person, place and time  § Speech/Language  § : communication ability within normal limits, voice quality normal, articulation of speech normal, no evidence of " aphasia  § Attention  § : attention normal, concentration abilities normal  o Sensation  o : grossly intact  o Gait and Station  o :   § Gait Screening  § : normal gait  · Psychiatric  o General  o : Alert and oriented x3  o Mood and Affect  o : Mood and affect are appropriate to circumstances          Assessment  · Pre-op exam     V72.84/Z01.818  · Esophageal dysphagia     787.20/R13.10  · Family history of colon cancer     V16.0/Z80.0  · Heartburn     787.1/R12  · Loose stools     787.7/R19.5  · Pancreatic cyst     577.2/K86.2  not seen on most recent imaging      Plan  · Orders  o [facility] Pre-Op Covid-19 Screening (55492) - - 02/02/2021  · Medications  o Plenvu 140-9-5.2 gram oral powder in packet, sequential   SIG: take by oral route as directed per office instructions   DISP: (1) Box with 0 refills  Prescribed on 02/02/2021     o Transition of Care or Provider Policy  · Instructions  o Please Sign Permit for: EGD/COLONOSCOPY  o Indication: HB, dysphagia, loose stools, fam hx colon cancer  o Surgical Risk and Benefits: Possible risks/complications, benefits, and alternatives to surgical or invasive procedure have been explained to patient and/or legal guardian; Patient has been evaluated and can tolerate anesthesia and/or sedation. Risks, benefits, and alternatives to anesthesia and sedation have been explained to patient and/or legal guardian.  o Follow Up after Procedure.  o Encouraged to follow-up with Primary Care Provider for preventative care.  o Patient was educated/instructed on their diagnosis, treatment and medications prior to discharge from the clinic today.  o Patient instructed to seek medical attention urgently for new or worsening symptoms.            Electronically Signed by: Marycarmen Velasquez APRN -Author on February 2, 2021 05:40:55 PM

## 2021-05-15 VITALS
SYSTOLIC BLOOD PRESSURE: 136 MMHG | DIASTOLIC BLOOD PRESSURE: 82 MMHG | BODY MASS INDEX: 28.32 KG/M2 | HEIGHT: 65 IN | HEART RATE: 96 BPM | WEIGHT: 170 LBS

## 2021-05-15 VITALS
WEIGHT: 171 LBS | BODY MASS INDEX: 27.48 KG/M2 | HEIGHT: 66 IN | DIASTOLIC BLOOD PRESSURE: 71 MMHG | SYSTOLIC BLOOD PRESSURE: 103 MMHG | HEART RATE: 124 BPM

## 2021-05-15 VITALS
DIASTOLIC BLOOD PRESSURE: 84 MMHG | WEIGHT: 171 LBS | HEART RATE: 68 BPM | BODY MASS INDEX: 27.48 KG/M2 | HEIGHT: 66 IN | SYSTOLIC BLOOD PRESSURE: 102 MMHG

## 2021-05-15 VITALS
WEIGHT: 182 LBS | HEIGHT: 66 IN | SYSTOLIC BLOOD PRESSURE: 150 MMHG | DIASTOLIC BLOOD PRESSURE: 82 MMHG | BODY MASS INDEX: 29.25 KG/M2 | HEART RATE: 66 BPM

## 2021-05-15 VITALS
HEIGHT: 66 IN | WEIGHT: 173 LBS | BODY MASS INDEX: 27.8 KG/M2 | DIASTOLIC BLOOD PRESSURE: 96 MMHG | HEART RATE: 60 BPM | SYSTOLIC BLOOD PRESSURE: 162 MMHG

## 2021-05-15 VITALS
HEART RATE: 94 BPM | SYSTOLIC BLOOD PRESSURE: 148 MMHG | WEIGHT: 165 LBS | HEIGHT: 66 IN | BODY MASS INDEX: 26.52 KG/M2 | DIASTOLIC BLOOD PRESSURE: 100 MMHG

## 2021-05-16 VITALS
DIASTOLIC BLOOD PRESSURE: 74 MMHG | TEMPERATURE: 98.1 F | HEIGHT: 65 IN | WEIGHT: 177 LBS | HEART RATE: 62 BPM | SYSTOLIC BLOOD PRESSURE: 173 MMHG | BODY MASS INDEX: 29.49 KG/M2

## 2021-05-16 VITALS
BODY MASS INDEX: 27.99 KG/M2 | SYSTOLIC BLOOD PRESSURE: 158 MMHG | HEIGHT: 65 IN | DIASTOLIC BLOOD PRESSURE: 78 MMHG | HEART RATE: 70 BPM | WEIGHT: 168 LBS

## 2021-05-16 VITALS — WEIGHT: 179.19 LBS | HEART RATE: 65 BPM | BODY MASS INDEX: 29.85 KG/M2 | HEIGHT: 65 IN

## 2021-05-16 VITALS
SYSTOLIC BLOOD PRESSURE: 154 MMHG | HEART RATE: 56 BPM | WEIGHT: 172 LBS | BODY MASS INDEX: 28.66 KG/M2 | HEIGHT: 65 IN | DIASTOLIC BLOOD PRESSURE: 74 MMHG

## 2021-05-16 VITALS — WEIGHT: 177 LBS | HEIGHT: 65 IN | BODY MASS INDEX: 29.49 KG/M2

## 2021-05-16 VITALS — HEIGHT: 65 IN | WEIGHT: 176.37 LBS | OXYGEN SATURATION: 96 % | HEART RATE: 80 BPM | BODY MASS INDEX: 29.38 KG/M2

## 2021-06-03 ENCOUNTER — OFFICE VISIT CONVERTED (OUTPATIENT)
Dept: NEUROSURGERY | Facility: CLINIC | Age: 71
End: 2021-06-03
Attending: NEUROLOGICAL SURGERY

## 2021-06-04 ENCOUNTER — TRANSCRIBE ORDERS (OUTPATIENT)
Dept: ADMINISTRATIVE | Facility: HOSPITAL | Age: 71
End: 2021-06-04

## 2021-06-04 DIAGNOSIS — Z09 FOLLOW UP: Primary | ICD-10-CM

## 2021-06-05 NOTE — H&P
History and Physical      Patient Name: Suzanna Santos   Patient ID: [id number]   Sex: Female   YOB: 1950    Primary Care Provider: Joshua Lee MD   Referring Provider: Joshua Lee MD    Visit Date: Lisa 3, 2021    Provider: Luis F Pastrana MD   Location: [facility]   Location Address: [address] [place] [address]  [id number]   Location Phone: [phone]          Chief Complaint  · subdural hematoma      History Of Present Illness  The patient is a right-handed 70 year old /White female, who presents on referral from Joshua Lee MD, for a neurosurgical evaluation of a subdural hematoma. The patient initially presented with headaches and a fall and on Eliquis on May 6th ago. She was found to have a left occipital SDH 6 days after the fall, stable 3 days later. She was also found to have an age-indeterminate T7 compression fracture.   The patient currently has no symptoms. She has pain in the mid-thoracic region. She has some generalized complaints related to the pain and her poor sleep.   Studies/Information Reviewed:  The following information was reviewed: a radiology report and the referring physician's notes. The following images are available for review: a CT scan and thoracic spine films that revealed a SDH stable in size in the left occipital region and an age indeterminate T7 compression fracture. There appears to be a very small falcine meningioma.   The patient's past medical history is notable for Eliquis for AFIB (as detailed on the face sheet).       Past Medical History  Acid reflux; Anemia, Unspecified; Arthritis; Asthma; Cervicalgia; Degenerative Disc Disease ; Heart Disease; Hypertension; Hypertension, Benign Essential; Lumbago/low back pain; Muscle cramps; Neuropathy; Reflux; Shortness of Breath; Thyroid disease; Thyroid disorder         Past Surgical History  Artificial Joints/Limbs; Cholecystecomy;  Colonoscopy; EGD; Gallbladder; heart surgery; Hysterectomy; Joint Surgery; Surgical Clips; Tonsillectomy         Medication List  baclofen 10 mg oral tablet; Celebrex 200 mg oral capsule; Eliquis 5 mg oral tablet; Fish Oil oral; Lasix 20 mg oral tablet; metoprolol succinate 25 mg oral tablet extended release 24 hr; olmesartan 40 mg oral tablet; pantoprazole 40 mg oral tablet,delayed release (DR/EC); Pepcid AC 10 mg oral tablet; Plenvu 140-9-5.2 gram oral powder in packet, sequential; Tums 200 mg calcium (500 mg) oral tablet,chewable; Vitamin B-12 250 mcg oral tablet         Allergy List  doxycycline hyclate; doxycycline monohydrate; lisinopril; Talwin       Allergies Reconciled  Family Medical History  Rectal Neoplasm, Malignant; Heart Disease; Diabetes, unspecified type; Family history of rectal cancer; Family history of certain chronic disabling diseases; arthritis; No family history of colorectal cancer; Family history of Arthritis; Family history of heart disease; Family history of diabetes mellitus         Social History  Alcohol (Never); Alcohol Use (Never); lives with spouse; ; .; Recreational Drug Use (Never); Retired; Retired.; Tobacco (Never)         Review of Systems  · Constitutional  o Admits  o : fatigue  o Denies  o : chills, excessive sweating, fever, sycope/passing out, weight gain, weight loss  · Eyes  o Denies  o : changes in vision, blurry vision, double vision  · HENT  o Denies  o : loss of hearing, ringing in the ears, ear aches, sore throat, nasal congestion, sinus pain, nose bleeds, seasonal allergies  · Cardiovascular  o Admits  o : anemia  o Denies  o : blood clots, swollen legs, easy burising or bleeding, transfusions  · Respiratory  o Denies  o : shortness of breath, dry cough, productive cough, pneumonia, COPD  · Gastrointestinal  o Admits  o : reflux  o Denies  o : difficulty swallowing  · Genitourinary  o Denies  o : incontinence  · Neurologic  o Denies  o : headache,  "seizure, stroke, tremor, loss of balance, falls, dizziness/vertigo, difficulty with sleep, numbness/tingling/paresthesia , difficulty with coordination, difficulty with dexterity, weakness  · Musculoskeletal  o Admits  o : joint pain, low back pain  o Denies  o : neck stiffness/pain, swollen lymph nodes, muscle aches, weakness, spasms, sciatica, pain radiating in arm, pain radiating in leg  · Endocrine  o Denies  o : diabetes, thyroid disorder  · Psychiatric  o Denies  o : anxiety, depression      Vitals  Date Time BP Position Site L\R Cuff Size HR RR TEMP (F) WT  HT  BMI kg/m2 BSA m2 O2 Sat FR L/min FiO2 HC       06/03/2021 12:51 PM        97.3 175lbs 7oz 5'  6\" 28.32 1.93             Physical Examination  · Constitutional  o Appearance  o : well-nourished, well developed, alert, in no acute distress  · Head and Face  o Head  o :   § Inspection  § : atraumatic, normocephalic  o Face  o :   § Inspection  § : no facial lesions  · Eyes  o Vision  o :   § Visual Fields  § : visual field testing intact to confrontation in all fields  o Conjunctivae  o : conjunctiva normal  o Sclerae  o : sclera white  · Respiratory  o Respiratory Effort  o : breathing unlabored  · Psychiatric  o Judgement and Insight  o : judgement and insight intact  o Thought Processes  o : rate of thoughts normal, thought content logical, abstract reasoning within normal limits, computation intact for basic mathematical constructs including addition and subtraction  o Mood and Affect  o : mood normal, affect appropriate  o Associations  o : associations within normal limits  o Presence of Abnormal Thoughts  o : no hallucinations, no delusions present, no psychotic thoughts     Normal EOMI  Pupils equal  Normal shoulder shrug  Facial strength normal  No drift                 Assessment  · Subdural Hemorrhage     432.1/I62.00  Left occipital  · Thoracic compression fracture     805.2/S22.000A  Likely acute, T7      Plan  · Orders  o ACO-39: Current " medications updated and reviewed (, 1159F) - - 06/03/2021  o CT Head/Brain without IV Contrast [facility] (23318) - - 06/03/2021  o Digital XR of thoracic spine, two views (76750) - - 07/29/2021  · Medications  o tramadol 50 mg oral tablet   SIG: take 1 tablet by oral route 3 times a day as needed   DISP: (30) Tablet with 1 refills  Prescribed on 06/03/2021     o Medications have been Reconciled  o Transition of Care or Provider Policy  · Instructions  o Encouraged to follow-up with Primary Care Provider for preventative care.  o The ROS and PFSH were reviewed at today's visit.  o We will repeat a CT of the head to assure continued of the SDH.  o We will try tramadol for the T7 fracture to see if better tolerated.  o We will repeat thoracic films in 8 weeks.  · Disposition  o Return to clinic in 2 months.            Electronically Signed by: Luis F Pastrana MD -Author on Lisa 3, 2021 01:22:35 PM

## 2021-06-15 ENCOUNTER — TELEPHONE (OUTPATIENT)
Dept: NEUROSURGERY | Facility: CLINIC | Age: 71
End: 2021-06-15

## 2021-06-15 ENCOUNTER — HOSPITAL ENCOUNTER (OUTPATIENT)
Dept: CT IMAGING | Facility: HOSPITAL | Age: 71
Discharge: HOME | End: 2021-06-15
Admitting: NEUROLOGICAL SURGERY

## 2021-06-15 DIAGNOSIS — Z09 FOLLOW UP: ICD-10-CM

## 2021-06-15 PROCEDURE — 70450 CT HEAD/BRAIN W/O DYE: CPT

## 2021-06-15 NOTE — TELEPHONE ENCOUNTER
----- Message from Luis F Pastrana MD sent at 6/15/2021 11:41 AM EDT -----  SDH resolved. OK to resume Eliquis. No f/u necessary here. No additonal scans necessary.

## 2021-07-01 ENCOUNTER — TRANSCRIBE ORDERS (OUTPATIENT)
Dept: INTERNAL MEDICINE | Facility: CLINIC | Age: 71
End: 2021-07-01

## 2021-07-01 ENCOUNTER — LAB (OUTPATIENT)
Dept: LAB | Facility: HOSPITAL | Age: 71
End: 2021-07-01

## 2021-07-01 DIAGNOSIS — E78.2 MIXED HYPERLIPIDEMIA: ICD-10-CM

## 2021-07-01 DIAGNOSIS — E04.1 NONTOXIC UNINODULAR GOITER: ICD-10-CM

## 2021-07-01 DIAGNOSIS — I10 ESSENTIAL HYPERTENSION, MALIGNANT: ICD-10-CM

## 2021-07-01 DIAGNOSIS — R53.83 TIREDNESS: ICD-10-CM

## 2021-07-01 DIAGNOSIS — I10 ESSENTIAL HYPERTENSION, MALIGNANT: Primary | ICD-10-CM

## 2021-07-01 LAB
ALBUMIN SERPL-MCNC: 4.4 G/DL (ref 3.5–5.2)
ALBUMIN/GLOB SERPL: 2.2 G/DL
ALP SERPL-CCNC: 71 U/L (ref 39–117)
ALT SERPL W P-5'-P-CCNC: 21 U/L (ref 1–33)
ANION GAP SERPL CALCULATED.3IONS-SCNC: 11.9 MMOL/L (ref 5–15)
ANISOCYTOSIS BLD QL: ABNORMAL
AST SERPL-CCNC: 27 U/L (ref 1–32)
BASOPHILS # BLD MANUAL: 0.06 10*3/MM3 (ref 0–0.2)
BASOPHILS NFR BLD AUTO: 1 % (ref 0–1.5)
BILIRUB SERPL-MCNC: 0.8 MG/DL (ref 0–1.2)
BUN SERPL-MCNC: 16 MG/DL (ref 8–23)
BUN/CREAT SERPL: 20 (ref 7–25)
CALCIUM SPEC-SCNC: 9.6 MG/DL (ref 8.6–10.5)
CHLORIDE SERPL-SCNC: 102 MMOL/L (ref 98–107)
CHOLEST SERPL-MCNC: 191 MG/DL (ref 0–200)
CO2 SERPL-SCNC: 25.1 MMOL/L (ref 22–29)
CREAT SERPL-MCNC: 0.8 MG/DL (ref 0.57–1)
DEPRECATED RDW RBC AUTO: 40.4 FL (ref 37–54)
EOSINOPHIL # BLD MANUAL: 0.12 10*3/MM3 (ref 0–0.4)
EOSINOPHIL NFR BLD MANUAL: 2 % (ref 0.3–6.2)
ERYTHROCYTE [DISTWIDTH] IN BLOOD BY AUTOMATED COUNT: 20.1 % (ref 12.3–15.4)
GFR SERPL CREATININE-BSD FRML MDRD: 71 ML/MIN/1.73
GLOBULIN UR ELPH-MCNC: 2 GM/DL
GLUCOSE SERPL-MCNC: 104 MG/DL (ref 65–99)
HCT VFR BLD AUTO: 34.3 % (ref 34–46.6)
HDLC SERPL-MCNC: 53 MG/DL (ref 40–60)
HGB BLD-MCNC: 10.6 G/DL (ref 12–15.9)
LDLC SERPL CALC-MCNC: 127 MG/DL (ref 0–100)
LDLC/HDLC SERPL: 2.38 {RATIO}
LYMPHOCYTES # BLD MANUAL: 1.01 10*3/MM3 (ref 0.7–3.1)
LYMPHOCYTES NFR BLD MANUAL: 17.3 % (ref 19.6–45.3)
LYMPHOCYTES NFR BLD MANUAL: 3.1 % (ref 5–12)
MCH RBC QN AUTO: 19.5 PG (ref 26.6–33)
MCHC RBC AUTO-ENTMCNC: 30.9 G/DL (ref 31.5–35.7)
MCV RBC AUTO: 63.2 FL (ref 79–97)
MICROCYTES BLD QL: ABNORMAL
MONOCYTES # BLD AUTO: 0.18 10*3/MM3 (ref 0.1–0.9)
NEUTROPHILS # BLD AUTO: 4.48 10*3/MM3 (ref 1.7–7)
NEUTROPHILS NFR BLD MANUAL: 76.5 % (ref 42.7–76)
PLAT MORPH BLD: NORMAL
PLATELET # BLD AUTO: 210 10*3/MM3 (ref 140–450)
POIKILOCYTOSIS BLD QL SMEAR: ABNORMAL
POTASSIUM SERPL-SCNC: 4.3 MMOL/L (ref 3.5–5.2)
PROT SERPL-MCNC: 6.4 G/DL (ref 6–8.5)
RBC # BLD AUTO: 5.43 10*6/MM3 (ref 3.77–5.28)
SODIUM SERPL-SCNC: 139 MMOL/L (ref 136–145)
TRIGL SERPL-MCNC: 59 MG/DL (ref 0–150)
TSH SERPL DL<=0.05 MIU/L-ACNC: 2.29 UIU/ML (ref 0.27–4.2)
VLDLC SERPL-MCNC: 11 MG/DL (ref 5–40)
WBC # BLD AUTO: 5.85 10*3/MM3 (ref 3.4–10.8)
WBC MORPH BLD: NORMAL

## 2021-07-01 PROCEDURE — 85007 BL SMEAR W/DIFF WBC COUNT: CPT

## 2021-07-01 PROCEDURE — 84443 ASSAY THYROID STIM HORMONE: CPT

## 2021-07-01 PROCEDURE — 36415 COLL VENOUS BLD VENIPUNCTURE: CPT

## 2021-07-01 PROCEDURE — 80053 COMPREHEN METABOLIC PANEL: CPT

## 2021-07-01 PROCEDURE — 85025 COMPLETE CBC W/AUTO DIFF WBC: CPT

## 2021-07-01 PROCEDURE — 80061 LIPID PANEL: CPT

## 2021-07-07 DIAGNOSIS — S22.060D COMPRESSION FRACTURE OF T7 VERTEBRA WITH ROUTINE HEALING, SUBSEQUENT ENCOUNTER: Primary | ICD-10-CM

## 2021-07-09 ENCOUNTER — TELEPHONE (OUTPATIENT)
Dept: CARDIOLOGY | Facility: CLINIC | Age: 71
End: 2021-07-09

## 2021-07-09 NOTE — TELEPHONE ENCOUNTER
Procedure: Colonoscopy and/or EGD    Med Directive: none (Patient is on Eliquis)    PMH: Afib s/p ablation; SSS s/p PPM; HTNl Diastolic CHF    Labs (7/1/2021): cr 0.80

## 2021-07-15 VITALS — WEIGHT: 175.44 LBS | BODY MASS INDEX: 28.2 KG/M2 | TEMPERATURE: 97.3 F | HEIGHT: 66 IN

## 2021-07-26 ENCOUNTER — HOSPITAL ENCOUNTER (OUTPATIENT)
Dept: GENERAL RADIOLOGY | Facility: HOSPITAL | Age: 71
Discharge: HOME | End: 2021-07-26
Admitting: NEUROLOGICAL SURGERY

## 2021-07-26 DIAGNOSIS — S22.060D COMPRESSION FRACTURE OF T7 VERTEBRA WITH ROUTINE HEALING, SUBSEQUENT ENCOUNTER: ICD-10-CM

## 2021-07-26 PROCEDURE — 72070 X-RAY EXAM THORAC SPINE 2VWS: CPT

## 2021-08-05 ENCOUNTER — OFFICE VISIT (OUTPATIENT)
Dept: NEUROSURGERY | Facility: CLINIC | Age: 71
End: 2021-08-05

## 2021-08-05 VITALS
HEIGHT: 66 IN | WEIGHT: 182.8 LBS | BODY MASS INDEX: 29.38 KG/M2 | SYSTOLIC BLOOD PRESSURE: 133 MMHG | DIASTOLIC BLOOD PRESSURE: 67 MMHG

## 2021-08-05 DIAGNOSIS — S22.060D COMPRESSION FRACTURE OF T7 VERTEBRA WITH ROUTINE HEALING, SUBSEQUENT ENCOUNTER: Primary | ICD-10-CM

## 2021-08-05 PROCEDURE — 99212 OFFICE O/P EST SF 10 MIN: CPT | Performed by: NEUROLOGICAL SURGERY

## 2021-08-05 RX ORDER — OLMESARTAN MEDOXOMIL 20 MG/1
20 TABLET ORAL DAILY
COMMUNITY
End: 2021-09-02

## 2021-08-05 NOTE — PROGRESS NOTES
Suzanna Santos is a 70 y.o. female that presents with Back Pain       She has noticed improved pain. She had a f/u CT with resolution of the SDH. Her plain thoracic films are stable.      Review of Systems   Musculoskeletal: Positive for back pain.   All other systems reviewed and are negative.       Vitals:    08/05/21 1314   BP: 133/67        Physical Exam  Constitutional:       Appearance: Normal appearance.   Pulmonary:      Effort: Pulmonary effort is normal.   Neurological:      Mental Status: She is alert.     I personally reviewed the patient's plain x-rays which shows stable compression fractures.        Assessment and Plan {CC Problem List  Visit Diagnosis  ROS  Review (Popup)  Health Maintenance  Quality  BestPractice  Medications  SmartSets  SnapShot Encounters  Media :23}   Problem List Items Addressed This Visit     None      Visit Diagnoses     Compression fracture of T7 vertebra with routine healing, subsequent encounter    -  Primary    Stable on plain film        She will call with any worsened pain.     Follow Up {Instructions Charge Capture  Follow-up Communications :23}   No follow-ups on file.

## 2021-09-02 RX ORDER — CELECOXIB 200 MG/1
200 CAPSULE ORAL DAILY
COMMUNITY
End: 2022-02-24

## 2021-09-02 RX ORDER — FOLIC ACID 1 MG/1
1 TABLET ORAL DAILY
COMMUNITY
End: 2022-02-24

## 2021-09-02 RX ORDER — OLMESARTAN MEDOXOMIL 40 MG/1
40 TABLET ORAL DAILY
COMMUNITY
End: 2022-01-17

## 2021-09-02 RX ORDER — FAMOTIDINE 20 MG/1
20 TABLET, FILM COATED ORAL NIGHTLY
COMMUNITY

## 2021-09-16 NOTE — PROGRESS NOTES
Chief Complaint/ HPI:   No chief complaint on file.  Follow-up with arthritis and hand pain, had previous fall in May 2021 with follow-up x-rays 3 July showing compression fractures, still has back pain and issues, but continues to stay ambulatory mobile and working,    Objective   Vital Signs  There were no vitals filed for this visit.   There is no height or weight on file to calculate BMI.  Review of Systems   Constitutional: Negative.    HENT: Negative.    Eyes: Negative.    Respiratory: Negative.    Cardiovascular: Negative.    Gastrointestinal: Negative.    Endocrine: Negative.    Genitourinary: Negative.    Musculoskeletal: Positive for arthralgias and back pain.   Allergic/Immunologic: Negative.    Neurological: Negative.    Hematological: Negative.    Psychiatric/Behavioral: Negative.       Physical Exam  Constitutional:       General: She is not in acute distress.     Appearance: Normal appearance.   HENT:      Head: Normocephalic.      Mouth/Throat:      Mouth: Mucous membranes are moist.   Eyes:      Conjunctiva/sclera: Conjunctivae normal.      Pupils: Pupils are equal, round, and reactive to light.   Cardiovascular:      Rate and Rhythm: Normal rate and regular rhythm.      Pulses: Normal pulses.      Heart sounds: Normal heart sounds.   Pulmonary:      Effort: Pulmonary effort is normal.      Breath sounds: Normal breath sounds.   Abdominal:      General: Abdomen is flat. Bowel sounds are normal.      Palpations: Abdomen is soft.   Musculoskeletal:         General: No swelling. Normal range of motion.      Cervical back: Neck supple.   Skin:     General: Skin is warm and dry.      Coloration: Skin is not jaundiced.      Findings: Rash (Patient has a rash on the low back area consistent with chronic heating pad usage,) present.   Neurological:      General: No focal deficit present.      Mental Status: She is alert and oriented to person, place, and time. Mental status is at baseline.   Psychiatric:          Mood and Affect: Mood normal.         Behavior: Behavior normal.         Thought Content: Thought content normal.         Judgment: Judgment normal.     3+ PT pulses bilateral,  Result Review :   Lab Results   Component Value Date     04/30/2019     CMP    CMP 5/6/21 5/10/21 7/1/21   Glucose   104 (A)   Glucose 126 (A) 103 (A)    BUN 20 12 16   Creatinine 1.79 (A) 0.81 0.80   eGFR Non African Am   71   Sodium 136 135 139   Potassium 3.8 4.1 4.3   Chloride 102 99 102   Calcium 9.2 9.7 9.6   Albumin 3.8 3.9 4.40   Total Bilirubin 0.35 0.69 0.8   Alkaline Phosphatase 62 61 71   AST (SGOT) 19 17 27   ALT (SGPT) 16 16 21   (A) Abnormal value       Comments are available for some flowsheets but are not being displayed.           CBC w/diff    CBC w/Diff 5/6/21 5/10/21 7/1/21   WBC 6.08 10.03 5.85   RBC 5.19 4.91 5.43 (A)   Hemoglobin 10.5 (A) 9.6 (A) 10.6 (A)   Hematocrit 32.9 (A) 31.0 (A) 34.3   MCV 63.4 (A) 63.1 (A) 63.2 (A)   MCH 20.2 (A) 19.6 (A) 19.5 (A)   MCHC 31.9 (A) 31.0 (A) 30.9 (A)   RDW 17.0 (A) 17.2 (A) 20.1 (A)   Platelets 235 259 210   Neutrophil Rel % 72.2 71.9    Lymphocyte Rel % 12.2 (A) 8.7 (A)    Monocyte Rel % 9.7 7.8    Eosinophil Rel % 5.3 9.7 (A)    Basophil Rel % 0.3 0.7    (A) Abnormal value             Lipid Panel    Lipid Panel 7/1/21   Total Cholesterol 191   Triglycerides 59   HDL Cholesterol 53   VLDL Cholesterol 11   LDL Cholesterol  127 (A)   LDL/HDL Ratio 2.38   (A) Abnormal value             Lab Results   Component Value Date    TSH 2.290 07/01/2021    TSH 1.770 05/10/2021    TSH 3.510 03/22/2021      Lab Results   Component Value Date    FREET4 1.2 03/22/2021    FREET4 1.0 12/03/2020    FREET4 1.6 09/30/2019                          No diagnosis found.    Assessment and Plan      acute T7 compression fracture after fall hitting her sternum with chest contusion, presyncope with head coontusion also and back pain, May 5 and sixth 2021, went to the emergency room, diagnosed  with UTI,---, continues to slowly improve but still has pain, continues to use heating pad, September 2021--- will do pain management, tramadol, continues on anti-inflammatories with Celebrex also,    ARTHRITIS, IN HANDS , THUMBS , R FOOT, LOW BACK PAIN, NECK OCC.--Now with cervical radiculopathy C4-5 area, -- rheumatologist -----was diagnosed clinically with rheumatoid arthritis, CCP antibody and rheumatoid factors are negative on blood test per rheumatology September 2020,--continues on Celebrex 200 mg daily, , started methotrexate,-12.5 mg weekly, folic acid January 2021 by rheumatologyMONCHO with rapid rate ---- electrophysiologist again, ablation due to atrial tachycardia July 29, 2019 Previous--aflutter, abalation march 8, 2019, and pacemaker done in [place] - SECOND ABALATION JULY 29, 2019, ---WORKED WELL ,-NOW IN PACED OR SINUS RHYTHM SEPTEMBER 2021      continues--ON METOPROLOL,25 BID , ELIQUIS 5 MG BID , LASIX PRN , PAXIL, SEROQUEL, IRON VIT C,     PAF, ABALATION AT [facility], JULY 2017,     HTN, --- continues olmesartan 40 mg daily, metoprolol 25 TWICE A DAY, will change telmisartan to olmesartan HCT, September 21, 2021 to help with blood pressure numbers which were reviewed today, 130s to 170s,    ANXIETY, DEPRESSION-- OFF PAXIL ,     ANEMIA, THALLESEMIA- STABLE- hemoglobin 11.4, September 2021 improved stable     Glaucoma, laser treatment of the left eye, August 2021, and macular tear in the right eye, has been seeing surgery and ophthalmology as of September 2021    R TKA, 5/13    HEART MURMUR, -aortic regurgitation moderate by echo February 2016, will follow with cardiology    GERD- , EGD JUNE 2018, ERIK MARIE---PROTONIX 40 MG QD     Previous cholecystectomy, previous left total knee replacement 2010, previous right total hip replacement 2008, JACINTO and BSO    R KNEE REVISION 6/2414 , WITH DR JOY-     MENOPAUSE-- Off VIVELLE     thryoid cyst, nodule -previous thyroid ultrasounds, March  2014 and April 2016   Most recent ultrasound October 2020 shows large mass in the left thyroid completely cystic, measures 7.2 cm previously measuring 6.1 cm, a 1 cm hypoechoic right thyroid nodule measuring up to 0.6 cm previously, and another smaller nodule at 0.6 cm in the right lower lobe of the thyroid,    Follow Up   No follow-ups on file.  Patient was given instructions and counseling regarding her condition or for health maintenance advice. Please see specific information pulled into the AVS if appropriate.       Answers for HPI/ROS submitted by the patient on 9/20/2021  What is the primary reason for your visit?: High Blood Pressure

## 2021-09-17 ENCOUNTER — TELEPHONE (OUTPATIENT)
Dept: CARDIOLOGY | Facility: CLINIC | Age: 71
End: 2021-09-17

## 2021-09-17 DIAGNOSIS — I49.5 SSS (SICK SINUS SYNDROME) (HCC): Primary | ICD-10-CM

## 2021-09-17 DIAGNOSIS — I47.1 ATRIAL TACHYCARDIA (HCC): ICD-10-CM

## 2021-09-17 NOTE — TELEPHONE ENCOUNTER
Pt called and was asking for her labs to be ordered so she can have them done before her appt with you on 9/21/21. Do you want just a CMP and CBC w/ Diff?    Please advise.

## 2021-09-20 ENCOUNTER — LAB (OUTPATIENT)
Dept: LAB | Facility: HOSPITAL | Age: 71
End: 2021-09-20

## 2021-09-20 LAB
ALBUMIN SERPL-MCNC: 4.6 G/DL (ref 3.5–5.2)
ALBUMIN/GLOB SERPL: 2.1 G/DL
ALP SERPL-CCNC: 81 U/L (ref 39–117)
ALT SERPL W P-5'-P-CCNC: 13 U/L (ref 1–33)
ANION GAP SERPL CALCULATED.3IONS-SCNC: 8.1 MMOL/L (ref 5–15)
AST SERPL-CCNC: 15 U/L (ref 1–32)
BASOPHILS # BLD MANUAL: 0.07 10*3/MM3 (ref 0–0.2)
BASOPHILS NFR BLD AUTO: 1 % (ref 0–1.5)
BILIRUB SERPL-MCNC: 0.5 MG/DL (ref 0–1.2)
BUN SERPL-MCNC: 15 MG/DL (ref 8–23)
BUN/CREAT SERPL: 17.2 (ref 7–25)
CALCIUM SPEC-SCNC: 9.9 MG/DL (ref 8.6–10.5)
CHLORIDE SERPL-SCNC: 101 MMOL/L (ref 98–107)
CO2 SERPL-SCNC: 28.9 MMOL/L (ref 22–29)
CREAT SERPL-MCNC: 0.87 MG/DL (ref 0.57–1)
DEPRECATED RDW RBC AUTO: 41.6 FL (ref 37–54)
EOSINOPHIL # BLD MANUAL: 0.14 10*3/MM3 (ref 0–0.4)
EOSINOPHIL NFR BLD MANUAL: 2 % (ref 0.3–6.2)
ERYTHROCYTE [DISTWIDTH] IN BLOOD BY AUTOMATED COUNT: 19.5 % (ref 12.3–15.4)
GFR SERPL CREATININE-BSD FRML MDRD: 64 ML/MIN/1.73
GLOBULIN UR ELPH-MCNC: 2.2 GM/DL
GLUCOSE SERPL-MCNC: 101 MG/DL (ref 65–99)
HCT VFR BLD AUTO: 38.3 % (ref 34–46.6)
HGB BLD-MCNC: 11.4 G/DL (ref 12–15.9)
LYMPHOCYTES # BLD MANUAL: 1.4 10*3/MM3 (ref 0.7–3.1)
LYMPHOCYTES NFR BLD MANUAL: 20.2 % (ref 19.6–45.3)
LYMPHOCYTES NFR BLD MANUAL: 7.1 % (ref 5–12)
MCH RBC QN AUTO: 19.9 PG (ref 26.6–33)
MCHC RBC AUTO-ENTMCNC: 29.8 G/DL (ref 31.5–35.7)
MCV RBC AUTO: 67 FL (ref 79–97)
MONOCYTES # BLD AUTO: 0.49 10*3/MM3 (ref 0.1–0.9)
NEUTROPHILS # BLD AUTO: 4.82 10*3/MM3 (ref 1.7–7)
NEUTROPHILS NFR BLD MANUAL: 69.7 % (ref 42.7–76)
PLAT MORPH BLD: NORMAL
PLATELET # BLD AUTO: 224 10*3/MM3 (ref 140–450)
POTASSIUM SERPL-SCNC: 4.6 MMOL/L (ref 3.5–5.2)
PROT SERPL-MCNC: 6.8 G/DL (ref 6–8.5)
RBC # BLD AUTO: 5.72 10*6/MM3 (ref 3.77–5.28)
RBC MORPH BLD: NORMAL
SODIUM SERPL-SCNC: 138 MMOL/L (ref 136–145)
WBC # BLD AUTO: 6.91 10*3/MM3 (ref 3.4–10.8)
WBC MORPH BLD: NORMAL

## 2021-09-20 PROCEDURE — 85025 COMPLETE CBC W/AUTO DIFF WBC: CPT | Performed by: INTERNAL MEDICINE

## 2021-09-20 PROCEDURE — 36415 COLL VENOUS BLD VENIPUNCTURE: CPT | Performed by: INTERNAL MEDICINE

## 2021-09-20 PROCEDURE — 80053 COMPREHEN METABOLIC PANEL: CPT | Performed by: INTERNAL MEDICINE

## 2021-09-20 PROCEDURE — 85007 BL SMEAR W/DIFF WBC COUNT: CPT | Performed by: INTERNAL MEDICINE

## 2021-09-20 RX ORDER — BUDESONIDE AND FORMOTEROL FUMARATE DIHYDRATE 160; 4.5 UG/1; UG/1
AEROSOL RESPIRATORY (INHALATION)
Qty: 10.2 G | OUTPATIENT
Start: 2021-09-20

## 2021-09-21 ENCOUNTER — OFFICE VISIT (OUTPATIENT)
Dept: INTERNAL MEDICINE | Facility: CLINIC | Age: 71
End: 2021-09-21

## 2021-09-21 VITALS
HEIGHT: 65 IN | TEMPERATURE: 97.9 F | OXYGEN SATURATION: 96 % | WEIGHT: 181 LBS | SYSTOLIC BLOOD PRESSURE: 145 MMHG | DIASTOLIC BLOOD PRESSURE: 89 MMHG | HEART RATE: 70 BPM | BODY MASS INDEX: 30.16 KG/M2

## 2021-09-21 DIAGNOSIS — E04.1 THYROID NODULE GREATER THAN OR EQUAL TO 1 CM IN DIAMETER INCIDENTALLY NOTED ON IMAGING STUDY: Primary | ICD-10-CM

## 2021-09-21 DIAGNOSIS — I10 ESSENTIAL HYPERTENSION: ICD-10-CM

## 2021-09-21 DIAGNOSIS — I48.91 ATRIAL FIBRILLATION, UNSPECIFIED TYPE (HCC): ICD-10-CM

## 2021-09-21 DIAGNOSIS — I49.5 SSS (SICK SINUS SYNDROME) (HCC): ICD-10-CM

## 2021-09-21 DIAGNOSIS — M80.08XD FRACTURE OF VERTEBRA DUE TO OSTEOPOROSIS WITH ROUTINE HEALING, SUBSEQUENT ENCOUNTER: ICD-10-CM

## 2021-09-21 DIAGNOSIS — Z98.890 H/O CARDIAC RADIOFREQUENCY ABLATION: ICD-10-CM

## 2021-09-21 DIAGNOSIS — I48.0 PAROXYSMAL ATRIAL FIBRILLATION (HCC): ICD-10-CM

## 2021-09-21 DIAGNOSIS — D50.9 IRON DEFICIENCY ANEMIA, UNSPECIFIED IRON DEFICIENCY ANEMIA TYPE: ICD-10-CM

## 2021-09-21 PROBLEM — M80.08XA FRACTURE OF VERTEBRA DUE TO OSTEOPOROSIS: Status: ACTIVE | Noted: 2021-09-21

## 2021-09-21 PROCEDURE — 99214 OFFICE O/P EST MOD 30 MIN: CPT | Performed by: INTERNAL MEDICINE

## 2021-09-21 RX ORDER — BUDESONIDE AND FORMOTEROL FUMARATE DIHYDRATE 160; 4.5 UG/1; UG/1
2 AEROSOL RESPIRATORY (INHALATION) 2 TIMES DAILY
COMMUNITY
Start: 2021-08-20 | End: 2021-12-20

## 2021-09-21 RX ORDER — ALBUTEROL SULFATE 90 UG/1
2 AEROSOL, METERED RESPIRATORY (INHALATION) DAILY PRN
COMMUNITY

## 2021-09-21 RX ORDER — OLMESARTAN MEDOXOMIL AND HYDROCHLOROTHIAZIDE 40/12.5 40; 12.5 MG/1; MG/1
1 TABLET ORAL DAILY
Qty: 90 TABLET | Refills: 3 | Status: SHIPPED | OUTPATIENT
Start: 2021-09-21 | End: 2022-09-15 | Stop reason: SDUPTHER

## 2021-09-21 RX ORDER — TRAMADOL HYDROCHLORIDE 50 MG/1
50 TABLET ORAL EVERY 6 HOURS PRN
Qty: 90 TABLET | Refills: 2 | Status: SHIPPED | OUTPATIENT
Start: 2021-09-21

## 2021-09-30 ENCOUNTER — ANESTHESIA (OUTPATIENT)
Dept: GASTROENTEROLOGY | Facility: HOSPITAL | Age: 71
End: 2021-09-30

## 2021-09-30 ENCOUNTER — ANESTHESIA EVENT (OUTPATIENT)
Dept: GASTROENTEROLOGY | Facility: HOSPITAL | Age: 71
End: 2021-09-30

## 2021-09-30 ENCOUNTER — HOSPITAL ENCOUNTER (OUTPATIENT)
Facility: HOSPITAL | Age: 71
Setting detail: HOSPITAL OUTPATIENT SURGERY
Discharge: HOME | End: 2021-09-30
Attending: INTERNAL MEDICINE | Admitting: INTERNAL MEDICINE

## 2021-09-30 VITALS
TEMPERATURE: 97.5 F | HEART RATE: 64 BPM | DIASTOLIC BLOOD PRESSURE: 75 MMHG | RESPIRATION RATE: 14 BRPM | BODY MASS INDEX: 30.45 KG/M2 | WEIGHT: 182.98 LBS | SYSTOLIC BLOOD PRESSURE: 106 MMHG | OXYGEN SATURATION: 98 %

## 2021-09-30 DIAGNOSIS — Z86.010 HISTORY OF COLON POLYPS: ICD-10-CM

## 2021-09-30 PROCEDURE — 45385 COLONOSCOPY W/LESION REMOVAL: CPT | Performed by: INTERNAL MEDICINE

## 2021-09-30 PROCEDURE — 25010000002 ONDANSETRON PER 1 MG: Performed by: ANESTHESIOLOGY

## 2021-09-30 PROCEDURE — 88305 TISSUE EXAM BY PATHOLOGIST: CPT | Performed by: INTERNAL MEDICINE

## 2021-09-30 PROCEDURE — 45380 COLONOSCOPY AND BIOPSY: CPT | Performed by: INTERNAL MEDICINE

## 2021-09-30 PROCEDURE — 25010000002 GLUCAGON (HUMAN RECOMBINANT) 1 MG RECONSTITUTED SOLUTION: Performed by: NURSE ANESTHETIST, CERTIFIED REGISTERED

## 2021-09-30 PROCEDURE — 25010000002 PROPOFOL 10 MG/ML EMULSION: Performed by: NURSE ANESTHETIST, CERTIFIED REGISTERED

## 2021-09-30 RX ORDER — SODIUM CHLORIDE, SODIUM LACTATE, POTASSIUM CHLORIDE, CALCIUM CHLORIDE 600; 310; 30; 20 MG/100ML; MG/100ML; MG/100ML; MG/100ML
30 INJECTION, SOLUTION INTRAVENOUS CONTINUOUS
Status: DISCONTINUED | OUTPATIENT
Start: 2021-09-30 | End: 2021-09-30 | Stop reason: HOSPADM

## 2021-09-30 RX ORDER — PROPOFOL 10 MG/ML
VIAL (ML) INTRAVENOUS AS NEEDED
Status: DISCONTINUED | OUTPATIENT
Start: 2021-09-30 | End: 2021-09-30 | Stop reason: SURG

## 2021-09-30 RX ORDER — ONDANSETRON 2 MG/ML
4 INJECTION INTRAMUSCULAR; INTRAVENOUS ONCE
Status: COMPLETED | OUTPATIENT
Start: 2021-09-30 | End: 2021-09-30

## 2021-09-30 RX ADMIN — GLUCAGON HYDROCHLORIDE 0.5 MG: KIT at 09:24

## 2021-09-30 RX ADMIN — PROPOFOL 200 MCG/KG/MIN: 10 INJECTION, EMULSION INTRAVENOUS at 09:06

## 2021-09-30 RX ADMIN — PROPOFOL 100 MG: 10 INJECTION, EMULSION INTRAVENOUS at 09:05

## 2021-09-30 RX ADMIN — ONDANSETRON 4 MG: 2 INJECTION INTRAMUSCULAR; INTRAVENOUS at 07:44

## 2021-09-30 RX ADMIN — SODIUM CHLORIDE, POTASSIUM CHLORIDE, SODIUM LACTATE AND CALCIUM CHLORIDE 30 ML/HR: 600; 310; 30; 20 INJECTION, SOLUTION INTRAVENOUS at 07:43

## 2021-09-30 NOTE — ANESTHESIA POSTPROCEDURE EVALUATION
Patient: Suzanna Santos    Procedure Summary     Date: 09/30/21 Room / Location: [facility] ENDOSCOPY 4 / [facility] ENDOSCOPY    Anesthesia Start: 0904 Anesthesia Stop: 0952    Procedure: COLONOSCOPY with BIOPSY/POLYPECTOMY COLD SNARE (N/A ) Diagnosis: (PIECEMEAL POLYP REMOVAL; PREVIOUS COLONOSCOPY 2021)    Surgeons: Tim Mixon MD Provider: Roshan Coelho MD    Anesthesia Type: general ASA Status: 3          Anesthesia Type: general    Vitals  Vitals Value Taken Time   BP 78/47 09/30/21 1002   Temp 36.8 °C (98.2 °F) 09/30/21 0953   Pulse 64 09/30/21 1005   Resp 14 09/30/21 0957   SpO2 95 % 09/30/21 1005   Vitals shown include unvalidated device data.        Post Anesthesia Care and Evaluation    Patient location during evaluation: bedside  Patient participation: complete - patient participated  Level of consciousness: awake  Pain score: 0  Pain management: adequate  Airway patency: patent  Anesthetic complications: No anesthetic complications  PONV Status: none  Cardiovascular status: acceptable and stable  Respiratory status: acceptable and room air  Hydration status: acceptable    Comments: An Anesthesiologist personally participated in the most demanding procedures (including induction and emergence if applicable) in the anesthesia plan, monitored the course of anesthesia administration at frequent intervals and remained physically present and available for immediate diagnosis and treatment of emergencies.

## 2021-09-30 NOTE — ANESTHESIA PREPROCEDURE EVALUATION
Anesthesia Evaluation     Patient summary reviewed and Nursing notes reviewed   history of anesthetic complications: PONV  NPO Solid Status: > 8 hours  NPO Liquid Status: > 2 hours           Airway   Mallampati: I  TM distance: >3 FB  Neck ROM: full  No difficulty expected  Dental      Pulmonary - normal exam    breath sounds clear to auscultation  (+) asthma,shortness of breath,   Cardiovascular - normal exam  Exercise tolerance: good (4-7 METS)    Rhythm: regular    (+) hypertension, dysrhythmias Atrial Fib,       Neuro/Psych- negative ROS  GI/Hepatic/Renal/Endo    (+)  GERD,      Musculoskeletal     (+) neck pain,   Abdominal    Substance History - negative use     OB/GYN negative ob/gyn ROS         Other   arthritis,    history of cancer                    Anesthesia Plan    ASA 3     general   (Total IV Anesthesia    Patient understands anesthesia not responsible for dental damage.  )  intravenous induction     Anesthetic plan, all risks, benefits, and alternatives have been provided, discussed and informed consent has been obtained with: patient.    Plan discussed with CRNA.

## 2021-10-01 ENCOUNTER — TRANSCRIBE ORDERS (OUTPATIENT)
Dept: LAB | Facility: HOSPITAL | Age: 71
End: 2021-10-01

## 2021-10-01 ENCOUNTER — LAB (OUTPATIENT)
Dept: LAB | Facility: HOSPITAL | Age: 71
End: 2021-10-01

## 2021-10-01 DIAGNOSIS — M06.4 INFLAMMATORY POLYARTHROPATHY (HCC): Primary | ICD-10-CM

## 2021-10-01 DIAGNOSIS — I10 ESSENTIAL HYPERTENSION: ICD-10-CM

## 2021-10-01 DIAGNOSIS — I49.5 SSS (SICK SINUS SYNDROME) (HCC): ICD-10-CM

## 2021-10-01 DIAGNOSIS — I48.91 ATRIAL FIBRILLATION, UNSPECIFIED TYPE (HCC): ICD-10-CM

## 2021-10-01 DIAGNOSIS — M80.08XD FRACTURE OF VERTEBRA DUE TO OSTEOPOROSIS WITH ROUTINE HEALING, SUBSEQUENT ENCOUNTER: ICD-10-CM

## 2021-10-01 DIAGNOSIS — D50.9 IRON DEFICIENCY ANEMIA, UNSPECIFIED IRON DEFICIENCY ANEMIA TYPE: ICD-10-CM

## 2021-10-01 DIAGNOSIS — M06.4 INFLAMMATORY POLYARTHRITIS (HCC): Primary | ICD-10-CM

## 2021-10-01 DIAGNOSIS — E04.1 THYROID NODULE GREATER THAN OR EQUAL TO 1 CM IN DIAMETER INCIDENTALLY NOTED ON IMAGING STUDY: ICD-10-CM

## 2021-10-01 DIAGNOSIS — Z98.890 H/O CARDIAC RADIOFREQUENCY ABLATION: ICD-10-CM

## 2021-10-01 DIAGNOSIS — M06.4 INFLAMMATORY POLYARTHROPATHY (HCC): ICD-10-CM

## 2021-10-01 DIAGNOSIS — I48.0 PAROXYSMAL ATRIAL FIBRILLATION (HCC): ICD-10-CM

## 2021-10-01 LAB
ALBUMIN SERPL-MCNC: 4.4 G/DL (ref 3.5–5.2)
ALBUMIN/GLOB SERPL: 2.2 G/DL
ALP SERPL-CCNC: 68 U/L (ref 39–117)
ALT SERPL W P-5'-P-CCNC: 18 U/L (ref 1–33)
ANION GAP SERPL CALCULATED.3IONS-SCNC: 10.3 MMOL/L (ref 5–15)
ANISOCYTOSIS BLD QL: ABNORMAL
AST SERPL-CCNC: 19 U/L (ref 1–32)
BASOPHILS # BLD MANUAL: 0.18 10*3/MM3 (ref 0–0.2)
BASOPHILS NFR BLD AUTO: 2.4 % (ref 0–1.5)
BILIRUB SERPL-MCNC: 0.7 MG/DL (ref 0–1.2)
BUN SERPL-MCNC: 17 MG/DL (ref 8–23)
BUN/CREAT SERPL: 22.4 (ref 7–25)
CALCIUM SPEC-SCNC: 9.6 MG/DL (ref 8.6–10.5)
CHLORIDE SERPL-SCNC: 106 MMOL/L (ref 98–107)
CO2 SERPL-SCNC: 23.7 MMOL/L (ref 22–29)
CREAT SERPL-MCNC: 0.76 MG/DL (ref 0.57–1)
CRP SERPL-MCNC: 0.38 MG/DL (ref 0–0.5)
CYTO UR: NORMAL
DACRYOCYTES BLD QL SMEAR: ABNORMAL
DEPRECATED RDW RBC AUTO: 38.5 FL (ref 37–54)
EOSINOPHIL # BLD MANUAL: 0.28 10*3/MM3 (ref 0–0.4)
EOSINOPHIL NFR BLD MANUAL: 3.7 % (ref 0.3–6.2)
ERYTHROCYTE [DISTWIDTH] IN BLOOD BY AUTOMATED COUNT: 18 % (ref 12.3–15.4)
ERYTHROCYTE [SEDIMENTATION RATE] IN BLOOD: 11 MM/HR (ref 0–30)
GFR SERPL CREATININE-BSD FRML MDRD: 75 ML/MIN/1.73
GLOBULIN UR ELPH-MCNC: 2 GM/DL
GLUCOSE SERPL-MCNC: 99 MG/DL (ref 65–99)
HCT VFR BLD AUTO: 33.3 % (ref 34–46.6)
HGB BLD-MCNC: 10.2 G/DL (ref 12–15.9)
LAB AP CASE REPORT: NORMAL
LAB AP CLINICAL INFORMATION: NORMAL
LYMPHOCYTES # BLD MANUAL: 0.74 10*3/MM3 (ref 0.7–3.1)
LYMPHOCYTES NFR BLD MANUAL: 2.4 % (ref 5–12)
LYMPHOCYTES NFR BLD MANUAL: 9.8 % (ref 19.6–45.3)
MCH RBC QN AUTO: 19.9 PG (ref 26.6–33)
MCHC RBC AUTO-ENTMCNC: 30.6 G/DL (ref 31.5–35.7)
MCV RBC AUTO: 64.9 FL (ref 79–97)
MICROCYTES BLD QL: ABNORMAL
MONOCYTES # BLD AUTO: 0.18 10*3/MM3 (ref 0.1–0.9)
NEUTROPHILS # BLD AUTO: 6.19 10*3/MM3 (ref 1.7–7)
NEUTROPHILS NFR BLD MANUAL: 81.7 % (ref 42.7–76)
OVALOCYTES BLD QL SMEAR: ABNORMAL
PATH REPORT.FINAL DX SPEC: NORMAL
PATH REPORT.GROSS SPEC: NORMAL
PLAT MORPH BLD: NORMAL
PLATELET # BLD AUTO: 217 10*3/MM3 (ref 140–450)
POIKILOCYTOSIS BLD QL SMEAR: ABNORMAL
POTASSIUM SERPL-SCNC: 4.1 MMOL/L (ref 3.5–5.2)
PROT SERPL-MCNC: 6.4 G/DL (ref 6–8.5)
RBC # BLD AUTO: 5.13 10*6/MM3 (ref 3.77–5.28)
SCHISTOCYTES BLD QL SMEAR: ABNORMAL
SODIUM SERPL-SCNC: 140 MMOL/L (ref 136–145)
WBC # BLD AUTO: 7.58 10*3/MM3 (ref 3.4–10.8)
WBC MORPH BLD: NORMAL

## 2021-10-01 PROCEDURE — 85652 RBC SED RATE AUTOMATED: CPT

## 2021-10-01 PROCEDURE — 86140 C-REACTIVE PROTEIN: CPT

## 2021-10-01 PROCEDURE — 36415 COLL VENOUS BLD VENIPUNCTURE: CPT

## 2021-10-01 PROCEDURE — 85025 COMPLETE CBC W/AUTO DIFF WBC: CPT

## 2021-10-01 PROCEDURE — 85007 BL SMEAR W/DIFF WBC COUNT: CPT

## 2021-10-01 PROCEDURE — 80053 COMPREHEN METABOLIC PANEL: CPT

## 2021-10-07 ENCOUNTER — HOSPITAL ENCOUNTER (OUTPATIENT)
Dept: ULTRASOUND IMAGING | Facility: HOSPITAL | Age: 71
Discharge: HOME | End: 2021-10-07
Admitting: INTERNAL MEDICINE

## 2021-10-07 DIAGNOSIS — I48.91 ATRIAL FIBRILLATION, UNSPECIFIED TYPE (HCC): ICD-10-CM

## 2021-10-07 DIAGNOSIS — E04.1 THYROID NODULE GREATER THAN OR EQUAL TO 1 CM IN DIAMETER INCIDENTALLY NOTED ON IMAGING STUDY: ICD-10-CM

## 2021-10-07 DIAGNOSIS — I10 ESSENTIAL HYPERTENSION: ICD-10-CM

## 2021-10-07 DIAGNOSIS — M80.08XD FRACTURE OF VERTEBRA DUE TO OSTEOPOROSIS WITH ROUTINE HEALING, SUBSEQUENT ENCOUNTER: ICD-10-CM

## 2021-10-07 DIAGNOSIS — I49.5 SSS (SICK SINUS SYNDROME) (HCC): ICD-10-CM

## 2021-10-07 DIAGNOSIS — I48.0 PAROXYSMAL ATRIAL FIBRILLATION (HCC): ICD-10-CM

## 2021-10-07 DIAGNOSIS — Z98.890 H/O CARDIAC RADIOFREQUENCY ABLATION: ICD-10-CM

## 2021-10-07 DIAGNOSIS — D50.9 IRON DEFICIENCY ANEMIA, UNSPECIFIED IRON DEFICIENCY ANEMIA TYPE: ICD-10-CM

## 2021-10-07 PROCEDURE — 76536 US EXAM OF HEAD AND NECK: CPT

## 2021-11-22 ENCOUNTER — TELEPHONE (OUTPATIENT)
Dept: CARDIOLOGY | Facility: CLINIC | Age: 71
End: 2021-11-22

## 2021-11-22 NOTE — TELEPHONE ENCOUNTER
Procedure: Teeth Extraction    Med Directive: Eliquis    PMH: afib, HTN, CHF    Last seen 11/11/2020

## 2021-12-17 DIAGNOSIS — I48.91 ATRIAL FIBRILLATION, UNSPECIFIED TYPE (HCC): ICD-10-CM

## 2021-12-20 RX ORDER — BUDESONIDE AND FORMOTEROL FUMARATE DIHYDRATE 160; 4.5 UG/1; UG/1
AEROSOL RESPIRATORY (INHALATION)
Qty: 10.2 G | Refills: 1 | Status: SHIPPED | OUTPATIENT
Start: 2021-12-20 | End: 2022-07-25

## 2021-12-23 NOTE — TELEPHONE ENCOUNTER
Rx Refill Note  Requested Prescriptions     Pending Prescriptions Disp Refills   • apixaban (ELIQUIS) 5 MG tablet tablet 180 tablet 0     Sig: Take 1 tablet by mouth Every 12 (Twelve) Hours. Must keep appt for any additional refills      Last office visit with prescribing clinician: Visit date not found      Next office visit with prescribing clinician: Visit date not found            Stefany Valdivia RN  12/23/21, 14:54 EST      Matches last OV from 11/11/20    Next appt 2/24/22

## 2021-12-27 NOTE — TELEPHONE ENCOUNTER
Pt is going out of town and needs the RX sent in to a local pharmacy because her mail in pharmacy has not received it or sent it to her yet.

## 2021-12-28 ENCOUNTER — LAB (OUTPATIENT)
Dept: LAB | Facility: HOSPITAL | Age: 71
End: 2021-12-28

## 2021-12-28 DIAGNOSIS — M06.4 INFLAMMATORY POLYARTHRITIS: ICD-10-CM

## 2021-12-28 LAB
ALBUMIN SERPL-MCNC: 4.2 G/DL (ref 3.5–5.2)
ALBUMIN/GLOB SERPL: 1.7 G/DL
ALP SERPL-CCNC: 70 U/L (ref 39–117)
ALT SERPL W P-5'-P-CCNC: 21 U/L (ref 1–33)
ANION GAP SERPL CALCULATED.3IONS-SCNC: 9.6 MMOL/L (ref 5–15)
AST SERPL-CCNC: 26 U/L (ref 1–32)
BASOPHILS # BLD AUTO: 0.1 10*3/MM3 (ref 0–0.2)
BASOPHILS NFR BLD AUTO: 1.6 % (ref 0–1.5)
BILIRUB SERPL-MCNC: 0.6 MG/DL (ref 0–1.2)
BUN SERPL-MCNC: 14 MG/DL (ref 8–23)
BUN/CREAT SERPL: 14.6 (ref 7–25)
CALCIUM SPEC-SCNC: 9.7 MG/DL (ref 8.6–10.5)
CHLORIDE SERPL-SCNC: 106 MMOL/L (ref 98–107)
CO2 SERPL-SCNC: 25.4 MMOL/L (ref 22–29)
CREAT SERPL-MCNC: 0.96 MG/DL (ref 0.57–1)
CRP SERPL-MCNC: 0.3 MG/DL (ref 0–0.5)
DEPRECATED RDW RBC AUTO: 38 FL (ref 37–54)
EOSINOPHIL # BLD AUTO: 0.23 10*3/MM3 (ref 0–0.4)
EOSINOPHIL NFR BLD AUTO: 3.6 % (ref 0.3–6.2)
ERYTHROCYTE [DISTWIDTH] IN BLOOD BY AUTOMATED COUNT: 19.2 % (ref 12.3–15.4)
ERYTHROCYTE [SEDIMENTATION RATE] IN BLOOD: 14 MM/HR (ref 0–30)
GFR SERPL CREATININE-BSD FRML MDRD: 57 ML/MIN/1.73
GLOBULIN UR ELPH-MCNC: 2.5 GM/DL
GLUCOSE SERPL-MCNC: 101 MG/DL (ref 65–99)
HCT VFR BLD AUTO: 38.4 % (ref 34–46.6)
HGB BLD-MCNC: 11.7 G/DL (ref 12–15.9)
LYMPHOCYTES # BLD AUTO: 1.2 10*3/MM3 (ref 0.7–3.1)
LYMPHOCYTES NFR BLD AUTO: 18.9 % (ref 19.6–45.3)
MCH RBC QN AUTO: 19.3 PG (ref 26.6–33)
MCHC RBC AUTO-ENTMCNC: 30.5 G/DL (ref 31.5–35.7)
MCV RBC AUTO: 63.3 FL (ref 79–97)
MONOCYTES # BLD AUTO: 0.75 10*3/MM3 (ref 0.1–0.9)
MONOCYTES NFR BLD AUTO: 11.8 % (ref 5–12)
NEUTROPHILS NFR BLD AUTO: 4.05 10*3/MM3 (ref 1.7–7)
NEUTROPHILS NFR BLD AUTO: 63.6 % (ref 42.7–76)
PLATELET # BLD AUTO: 189 10*3/MM3 (ref 140–450)
POTASSIUM SERPL-SCNC: 4.5 MMOL/L (ref 3.5–5.2)
PROT SERPL-MCNC: 6.7 G/DL (ref 6–8.5)
RBC # BLD AUTO: 6.07 10*6/MM3 (ref 3.77–5.28)
SODIUM SERPL-SCNC: 141 MMOL/L (ref 136–145)
WBC NRBC COR # BLD: 6.36 10*3/MM3 (ref 3.4–10.8)

## 2021-12-28 PROCEDURE — 86140 C-REACTIVE PROTEIN: CPT

## 2021-12-28 PROCEDURE — 85025 COMPLETE CBC W/AUTO DIFF WBC: CPT

## 2021-12-28 PROCEDURE — 85652 RBC SED RATE AUTOMATED: CPT

## 2021-12-28 PROCEDURE — 36415 COLL VENOUS BLD VENIPUNCTURE: CPT

## 2021-12-28 PROCEDURE — 80053 COMPREHEN METABOLIC PANEL: CPT

## 2022-01-17 ENCOUNTER — OFFICE VISIT (OUTPATIENT)
Dept: INTERNAL MEDICINE | Facility: CLINIC | Age: 72
End: 2022-01-17

## 2022-01-17 ENCOUNTER — LAB (OUTPATIENT)
Dept: LAB | Facility: HOSPITAL | Age: 72
End: 2022-01-17

## 2022-01-17 ENCOUNTER — HOSPITAL ENCOUNTER (OUTPATIENT)
Dept: GENERAL RADIOLOGY | Facility: HOSPITAL | Age: 72
Discharge: HOME | End: 2022-01-17

## 2022-01-17 VITALS
DIASTOLIC BLOOD PRESSURE: 72 MMHG | RESPIRATION RATE: 18 BRPM | TEMPERATURE: 97.8 F | SYSTOLIC BLOOD PRESSURE: 148 MMHG | HEART RATE: 80 BPM | WEIGHT: 183.4 LBS | HEIGHT: 64 IN | BODY MASS INDEX: 31.31 KG/M2 | OXYGEN SATURATION: 98 %

## 2022-01-17 DIAGNOSIS — J45.41 MODERATE PERSISTENT ASTHMA WITH ACUTE EXACERBATION: ICD-10-CM

## 2022-01-17 DIAGNOSIS — Z20.822 EXPOSURE TO COVID-19 VIRUS: ICD-10-CM

## 2022-01-17 DIAGNOSIS — Z20.822 EXPOSURE TO COVID-19 VIRUS: Primary | ICD-10-CM

## 2022-01-17 PROBLEM — R06.02 SHORTNESS OF BREATH: Status: ACTIVE | Noted: 2022-01-17

## 2022-01-17 PROBLEM — E55.9 VITAMIN D DEFICIENCY: Status: ACTIVE | Noted: 2022-01-17

## 2022-01-17 PROBLEM — E78.2 MIXED HYPERLIPIDEMIA: Status: ACTIVE | Noted: 2022-01-17

## 2022-01-17 PROBLEM — M54.2 NECK PAIN: Status: ACTIVE | Noted: 2022-01-17

## 2022-01-17 PROBLEM — H35.342 FULL THICKNESS MACULAR HOLE OF LEFT EYE: Status: ACTIVE | Noted: 2019-04-30

## 2022-01-17 PROBLEM — M19.90 ARTHRITIS: Status: ACTIVE | Noted: 2022-01-17

## 2022-01-17 PROBLEM — R25.2 MUSCLE CRAMPS: Status: ACTIVE | Noted: 2022-01-17

## 2022-01-17 PROBLEM — G62.9 NEUROPATHY: Status: ACTIVE | Noted: 2022-01-17

## 2022-01-17 PROBLEM — J45.909 ASTHMA: Status: ACTIVE | Noted: 2022-01-17

## 2022-01-17 PROBLEM — H35.3110 NONEXUDATIVE AGE-RELATED MACULAR DEGENERATION OF RIGHT EYE: Status: ACTIVE | Noted: 2017-02-06

## 2022-01-17 PROBLEM — H40.1110 PRIMARY OPEN ANGLE GLAUCOMA OF RIGHT EYE: Status: ACTIVE | Noted: 2021-09-22

## 2022-01-17 LAB
EXPIRATION DATE: NORMAL
FLUAV AG NPH QL: NEGATIVE
FLUBV AG NPH QL: NEGATIVE
INTERNAL CONTROL: NORMAL
Lab: NORMAL

## 2022-01-17 PROCEDURE — U0004 COV-19 TEST NON-CDC HGH THRU: HCPCS

## 2022-01-17 PROCEDURE — 99214 OFFICE O/P EST MOD 30 MIN: CPT

## 2022-01-17 PROCEDURE — 87804 INFLUENZA ASSAY W/OPTIC: CPT

## 2022-01-17 PROCEDURE — 71046 X-RAY EXAM CHEST 2 VIEWS: CPT

## 2022-01-17 PROCEDURE — C9803 HOPD COVID-19 SPEC COLLECT: HCPCS

## 2022-01-17 RX ORDER — IPRATROPIUM BROMIDE AND ALBUTEROL SULFATE 2.5; .5 MG/3ML; MG/3ML
3 SOLUTION RESPIRATORY (INHALATION) EVERY 4 HOURS PRN
Qty: 120 ML | Refills: 1 | Status: SHIPPED | OUTPATIENT
Start: 2022-01-17 | End: 2022-02-24

## 2022-01-17 RX ORDER — AZITHROMYCIN 250 MG/1
TABLET, FILM COATED ORAL
Qty: 6 TABLET | Refills: 0 | Status: SHIPPED | OUTPATIENT
Start: 2022-01-17 | End: 2022-02-24

## 2022-01-17 RX ORDER — DEXAMETHASONE 6 MG/1
6 TABLET ORAL
Qty: 10 TABLET | Refills: 0 | Status: SHIPPED | OUTPATIENT
Start: 2022-01-17 | End: 2022-02-24

## 2022-01-17 RX ORDER — LEFLUNOMIDE 10 MG/1
10 TABLET ORAL DAILY
COMMUNITY
Start: 2021-12-17 | End: 2022-02-24

## 2022-01-17 NOTE — PROGRESS NOTES
"Chief Complaint  Headache (patient believes she got COVID from trip to [place]. symptoms started a couple days ago.), Shortness of Breath (patient believes she is wheezing, symptoms started a couple days ago.), Cough (having a sputum anywhere from clear to yeloowish to brown. symptoms started a couple days ago.), Nasal Congestion (symptoms started a couple days ago.), and Fatigue (patient was in [place] ER and had nausea, vomitting, dry hieving, dehydration, having fever and sweats/chills)    Subjective          History of Present Illness  Suzanna Santos presents to [facility]  With complaints of headache, shortness of breath, wheezing, productive cough, nasal congestion and fatigue. This started about 5 days ago. She was seen in the ED in [place] with nausea, vomiting, dehydration, fever, and sweats/chills. She was not tested for COVID-19.  No fever over the last day or two.   The GI Symptoms are much improved. She does not have much of an appetite.     She has taken tylenol, nyquil, and flonase.     She has been in close contacts with COVID.   Objective   Vital Signs:   /72 (BP Location: Left arm, Patient Position: Sitting, Cuff Size: Adult)   Pulse 80   Temp 97.8 °F (36.6 °C) (Temporal)   Resp 18   Ht 163 cm (64.17\")   Wt 83.2 kg (183 lb 6.4 oz)   SpO2 98%   BMI 31.31 kg/m²     Physical Exam  Constitutional:       Appearance: Normal appearance. She is ill-appearing.   HENT:      Nose:      Right Sinus: Maxillary sinus tenderness present. No frontal sinus tenderness.      Left Sinus: Maxillary sinus tenderness present. No frontal sinus tenderness.      Mouth/Throat:      Pharynx: Posterior oropharyngeal erythema present.   Eyes:      Extraocular Movements: Extraocular movements intact.      Conjunctiva/sclera: Conjunctivae normal.   Cardiovascular:      Rate and Rhythm: Normal rate. Rhythm irregular.      Pulses: Normal pulses.      Heart sounds: Normal heart " sounds.   Pulmonary:      Effort: Pulmonary effort is normal.      Breath sounds: Decreased air movement present. Wheezing (with forced expiration) present.   Abdominal:      General: Bowel sounds are normal.      Palpations: Abdomen is soft.      Tenderness: There is no abdominal tenderness.   Musculoskeletal:         General: Normal range of motion.      Cervical back: Normal range of motion and neck supple.   Skin:     General: Skin is warm and dry.   Neurological:      Mental Status: She is alert and oriented to person, place, and time.   Psychiatric:         Mood and Affect: Mood normal.         Behavior: Behavior normal.         Thought Content: Thought content normal.         Judgment: Judgment normal.        Result Review :                 Assessment and Plan    Diagnoses and all orders for this visit:    1. Exposure to COVID-19 virus (Primary)  Assessment & Plan:  Patient is here today with complaints of headache, shortness of breath, wheezing, productive cough, nasal congestion and fatigue.  She states this started about 5 to get days ago.  Symptoms 5 days ago included nausea, vomiting, dehydration, fever and chills.  She went to the emergency room when she was in [place] and was treated there with IV fluids and nausea medication.  They did not test her for COVID at that time.  She states that her GI symptoms are much improved although she does not have much of an appetite.  She states that people she was with down in [place] did end up testing positive for COVID-19.  She feels like her lungs are tight and congested.  She does have history of asthma.  She has been taking Tylenol, NyQuil, and Flonase.  Plan: Chest x-ray, COVID test.  Decadron 6 mg daily.  I will also send her in a nebulizer with Romy.  I will also have her trial Trelegy for the time being.  Mucinex.  Drink plenty fluids.  Follow-up with me in 1 week.  Monitor O2 saturation and symptoms at home, seek medical attention with any worsening  symptoms.    Orders:  -     XR Chest PA & Lateral  -     COVID-19,APTIMA PANTHER(ISAAC),[facility], NP/OP SWAB IN UTM/VTM/SALINE TRANSPORT MEDIA,24 HR TAT - Swab, Nasopharynx; Future  -     Home Nebulizer  -     POCT Influenza A/B    2. Moderate persistent asthma with acute exacerbation  Assessment & Plan:  Asthma is worsening.  Most likely secondary to COVID-19 infection.  Patient is using Symbicort and albuterol as needed.  She has shortness of breath, wheezing and a productive cough.  O2 sat 98%.  She is also very fatigued.  Lungs sound really tight.  Plan: Chest x-ray and COVID test.  I am going to have her hold off on the Symbicort for now and trial of Trelegy.  Decadron 6 mg daily.  DuoNebs as needed.  I have also encouraged her to take Mucinex.  Drink plenty fluids.      Orders:  -     XR Chest PA & Lateral  -     COVID-19,APTIMA PANTHER(ISAAC),[facility], NP/OP SWAB IN UTM/VTM/SALINE TRANSPORT MEDIA,24 HR TAT - Swab, Nasopharynx; Future  -     Home Nebulizer  -     POCT Influenza A/B    Other orders  -     ipratropium-albuterol (DUO-NEB) 0.5-2.5 mg/3 ml nebulizer; Take 3 mL by nebulization Every 4 (Four) Hours As Needed for Wheezing or Shortness of Air.  Dispense: 120 mL; Refill: 1  -     dexamethasone (DECADRON) 6 MG tablet; Take 1 tablet by mouth Daily With Breakfast.  Dispense: 10 tablet; Refill: 0  -     Fluticasone-Umeclidin-Vilant (Trelegy Ellipta) 100-62.5-25 MCG/INH inhaler; Inhale 1 puff Daily.  Dispense: 28 each; Refill: 0  -     azithromycin (Zithromax Z-Donte) 250 MG tablet; Take 2 tablets by mouth on day 1, then 1 tablet daily on days 2-5  Dispense: 6 tablet; Refill: 0      Follow Up   Return in about 1 week (around 1/24/2022).  Patient was given instructions and counseling regarding her condition or for health maintenance advice. Please see specific information pulled into the AVS if appropriate.

## 2022-01-17 NOTE — ASSESSMENT & PLAN NOTE
Patient is here today with complaints of headache, shortness of breath, wheezing, productive cough, nasal congestion and fatigue.  She states this started about 5 to get days ago.  Symptoms 5 days ago included nausea, vomiting, dehydration, fever and chills.  She went to the emergency room when she was in [place] and was treated there with IV fluids and nausea medication.  They did not test her for COVID at that time.  She states that her GI symptoms are much improved although she does not have much of an appetite.  She states that people she was with down in [place] did end up testing positive for COVID-19.  She feels like her lungs are tight and congested.  She does have history of asthma.  She has been taking Tylenol, NyQuil, and Flonase.  Plan: Chest x-ray, COVID test.  Decadron 6 mg daily.  I will also send her in a nebulizer with Romy.  I will also have her trial Trelegy for the time being.  Mucinex.  Drink plenty fluids.  Follow-up with me in 1 week.  Monitor O2 saturation and symptoms at home, seek medical attention with any worsening symptoms.

## 2022-01-17 NOTE — ASSESSMENT & PLAN NOTE
Asthma is worsening.  Most likely secondary to COVID-19 infection.  Patient is using Symbicort and albuterol as needed.  She has shortness of breath, wheezing and a productive cough.  O2 sat 98%.  She is also very fatigued.  Lungs sound really tight.  Plan: Chest x-ray and COVID test.  I am going to have her hold off on the Symbicort for now and trial of Trelegy.  Decadron 6 mg daily.  DuoNebs as needed.  I have also encouraged her to take Mucinex.  Drink plenty fluids.

## 2022-01-18 LAB — SARS-COV-2 RNA PNL SPEC NAA+PROBE: DETECTED

## 2022-01-25 ENCOUNTER — TELEMEDICINE (OUTPATIENT)
Dept: INTERNAL MEDICINE | Facility: CLINIC | Age: 72
End: 2022-01-25

## 2022-01-25 ENCOUNTER — HOSPITAL ENCOUNTER (OUTPATIENT)
Dept: GENERAL RADIOLOGY | Facility: HOSPITAL | Age: 72
Discharge: HOME | End: 2022-01-25

## 2022-01-25 DIAGNOSIS — R05.9 COUGH: ICD-10-CM

## 2022-01-25 DIAGNOSIS — U07.1 COVID-19: Primary | ICD-10-CM

## 2022-01-25 PROCEDURE — 71046 X-RAY EXAM CHEST 2 VIEWS: CPT

## 2022-01-25 PROCEDURE — 99213 OFFICE O/P EST LOW 20 MIN: CPT

## 2022-01-25 NOTE — PROGRESS NOTES
Mode of Visit: Video  Location of patient: home  You have chosen to receive care through a telehealth visit.  Does the patient consent to use a video/audio connection for your medical care today? Yes  The visit included audio and video interaction. No technical issues occurred during this visit.     Chief Complaint  Follow-up (still has major congestion in chest. coughing makes her lose her breath. still feeling weak, little to no energy.Uses both inhaler constantly with some relief. Steriod has 2 more days left. finished ZPak. )    Subjective          Suzanna Santos presents to [facility]  History of Present Illness   Follow up on COVID. I gave her trellegy, nebulizer treatments, decadron and zpak last week. She states that her main problem is that she has coughing spells. Overall she is doing better, but when she starts coughing she starts to lose her breath some because she feels like she is not bringing anything up.   She feels like she is getting good oxygenation. She is walking around her [facility].  She denies any fever, chills, myalgias.   She continues to take vitamin c, d, and zinc.  Objective      Vital Signs:   There were no vitals taken for this visit.    Physical Exam   Constitutional: She appears well-developed and well-nourished.   Eyes: Pupils are equal, round, and reactive to light. EOM are normal.   Neck: Neck normal appearance.  Pulmonary/Chest: Effort normal.  No respiratory distress.  No wheezes heard over video call   Musculoskeletal: Normal range of motion.   Neurological: She is alert.   Skin: Skin is warm and dry.   Psychiatric: She has a normal mood and affect.     Result Review :   The following data was reviewed by: ROSHAN Sheffield on 01/25/2022:  Covid Tests    Common Labsle 1/17/22   COVID19 Detected (A)   (A) Abnormal value                 Chest x-ray reviewed      Assessment and Plan    Diagnoses and all orders for this visit:    1.  COVID-19 (Primary)  Assessment & Plan:  Follow up on COVID. I gave her trellegy, nebulizer treatments, decadron and zpak last week. She states that her main problem is that she has coughing spells occasionally. Overall she is doing better, but when she starts coughing she starts to lose her breath some because she feels like she is not bringing anything up. She feels like there is junk down in there.  She feels like she is getting good oxygenation. She is walking around her [facility], doing well with that.  She denies any fever, chills, myalgias, GI symptoms.   She continues to take vitamin c, d, and zinc.  Plan: Finish decadron, continue breathing treatments and trellegy. Encouraged her to take mucinex as well BID. Drink plenty of fluids. Repeat chest x-ray to r/o PNA.  Continue vitamin regimen.        Follow Up   Return if symptoms worsen or fail to improve.  Patient was given instructions and counseling regarding her condition or for health maintenance advice. Please see specific information pulled into the AVS if appropriate.

## 2022-01-25 NOTE — ASSESSMENT & PLAN NOTE
Follow up on COVID. I gave her trellegy, nebulizer treatments, decadron and zpak last week. She states that her main problem is that she has coughing spells occasionally. Overall she is doing better, but when she starts coughing she starts to lose her breath some because she feels like she is not bringing anything up. She feels like there is junk down in there.  She feels like she is getting good oxygenation. She is walking around her [facility], doing well with that.  She denies any fever, chills, myalgias, GI symptoms.   She continues to take vitamin c, d, and zinc.  Plan: Finish decadron, continue breathing treatments and trellegy. Encouraged her to take mucinex as well BID. Drink plenty of fluids. Repeat chest x-ray to r/o PNA.  Continue vitamin regimen.

## 2022-01-27 ENCOUNTER — LAB (OUTPATIENT)
Dept: LAB | Facility: HOSPITAL | Age: 72
End: 2022-01-27

## 2022-01-27 ENCOUNTER — TRANSCRIBE ORDERS (OUTPATIENT)
Dept: LAB | Facility: HOSPITAL | Age: 72
End: 2022-01-27

## 2022-01-27 DIAGNOSIS — Z01.818 PRE-OP TESTING: Primary | ICD-10-CM

## 2022-01-27 DIAGNOSIS — Z01.818 PRE-OP TESTING: ICD-10-CM

## 2022-01-27 PROCEDURE — U0004 COV-19 TEST NON-CDC HGH THRU: HCPCS

## 2022-01-27 PROCEDURE — C9803 HOPD COVID-19 SPEC COLLECT: HCPCS

## 2022-01-28 LAB — SARS-COV-2 RNA PNL SPEC NAA+PROBE: DETECTED

## 2022-01-31 ENCOUNTER — TELEMEDICINE (OUTPATIENT)
Dept: INTERNAL MEDICINE | Facility: CLINIC | Age: 72
End: 2022-01-31

## 2022-01-31 DIAGNOSIS — J01.00 ACUTE MAXILLARY SINUSITIS, RECURRENCE NOT SPECIFIED: ICD-10-CM

## 2022-01-31 DIAGNOSIS — J06.9 ACUTE URI: Primary | ICD-10-CM

## 2022-01-31 DIAGNOSIS — U07.1 COVID-19 VIRUS DETECTED: ICD-10-CM

## 2022-01-31 PROCEDURE — 99213 OFFICE O/P EST LOW 20 MIN: CPT | Performed by: INTERNAL MEDICINE

## 2022-01-31 RX ORDER — DEXAMETHASONE 4 MG/1
4 TABLET ORAL 2 TIMES DAILY WITH MEALS
Qty: 14 TABLET | Refills: 0 | Status: SHIPPED | OUTPATIENT
Start: 2022-01-31 | End: 2022-02-24

## 2022-01-31 RX ORDER — LEVOFLOXACIN 500 MG/1
500 TABLET, FILM COATED ORAL DAILY
Qty: 7 TABLET | Refills: 0 | Status: SHIPPED | OUTPATIENT
Start: 2022-01-31 | End: 2022-02-24

## 2022-01-31 RX ORDER — TEMAZEPAM 30 MG/1
30 CAPSULE ORAL NIGHTLY PRN
Qty: 30 CAPSULE | Refills: 3 | Status: SHIPPED | OUTPATIENT
Start: 2022-01-31 | End: 2022-02-24

## 2022-01-31 NOTE — PROGRESS NOTES
Chief Complaint/ HPI: cough , congestion and positive covid jan 17, and 27, 2022    thurs coughing spells, rattling and coughing prod . Brown stuff  Using nebulizer         Objective   Vital Signs  There were no vitals filed for this visit.   There is no height or weight on file to calculate BMI.  Review of Systems cough, prod, general malaise     Physical Exam alert and o x 3  No facial rash  Result Review :   Lab Results   Component Value Date     04/30/2019     CMP    CMP 9/20/21 10/1/21 12/28/21   Glucose 101 (A) 99 101 (A)   BUN 15 17 14   Creatinine 0.87 0.76 0.96   eGFR Non  Am 64 75 57 (A)   Sodium 138 140 141   Potassium 4.6 4.1 4.5   Chloride 101 106 106   Calcium 9.9 9.6 9.7   Albumin 4.60 4.40 4.20   Total Bilirubin 0.5 0.7 0.6   Alkaline Phosphatase 81 68 70   AST (SGOT) 15 19 26   ALT (SGPT) 13 18 21   (A) Abnormal value            CBC w/diff    CBC w/Diff 9/20/21 10/1/21 12/28/21   WBC 6.91 7.58 6.36   RBC 5.72 (A) 5.13 6.07 (A)   Hemoglobin 11.4 (A) 10.2 (A) 11.7 (A)   Hematocrit 38.3 33.3 (A) 38.4   MCV 67.0 (A) 64.9 (A) 63.3 (A)   MCH 19.9 (A) 19.9 (A) 19.3 (A)   MCHC 29.8 (A) 30.6 (A) 30.5 (A)   RDW 19.5 (A) 18.0 (A) 19.2 (A)   Platelets 224 217 189   Neutrophil Rel %   63.6   Lymphocyte Rel %   18.9 (A)   Monocyte Rel %   11.8   Eosinophil Rel %   3.6   Basophil Rel %   1.6 (A)   (A) Abnormal value             Lipid Panel    Lipid Panel 7/1/21   Total Cholesterol 191   Triglycerides 59   HDL Cholesterol 53   VLDL Cholesterol 11   LDL Cholesterol  127 (A)   LDL/HDL Ratio 2.38   (A) Abnormal value             Lab Results   Component Value Date    TSH 2.290 07/01/2021    TSH 1.770 05/10/2021    TSH 3.510 03/22/2021      Lab Results   Component Value Date    FREET4 1.2 03/22/2021    FREET4 1.0 12/03/2020    FREET4 1.6 09/30/2019                          Visit Diagnoses:    ICD-10-CM ICD-9-CM   1. Acute URI  J06.9 465.9   2. Acute maxillary sinusitis, recurrence not specified  J01.00  461.0   3. COVID-19 virus detected  U07.1 079.89       Assessment and Plan   Diagnoses and all orders for this visit:    1. Acute URI (Primary)  -     Discontinue: HYDROcod Polst-CPM Polst ER (Tussionex Pennkinetic ER) 10-8 MG/5ML ER suspension; Take 5 mL by mouth Every 12 (Twelve) Hours As Needed for Cough.  Dispense: 100 mL; Refill: 0  -     temazepam (RESTORIL) 30 MG capsule; Take 1 capsule by mouth At Night As Needed for Sleep.  Dispense: 30 capsule; Refill: 3    2. Acute maxillary sinusitis, recurrence not specified  -     Discontinue: HYDROcod Polst-CPM Polst ER (Tussionex Pennkinetic ER) 10-8 MG/5ML ER suspension; Take 5 mL by mouth Every 12 (Twelve) Hours As Needed for Cough.  Dispense: 100 mL; Refill: 0  -     temazepam (RESTORIL) 30 MG capsule; Take 1 capsule by mouth At Night As Needed for Sleep.  Dispense: 30 capsule; Refill: 3    3. COVID-19 virus detected  -     Discontinue: HYDROcod Polst-CPM Polst ER (Tussionex Pennkinetic ER) 10-8 MG/5ML ER suspension; Take 5 mL by mouth Every 12 (Twelve) Hours As Needed for Cough.  Dispense: 100 mL; Refill: 0  -     temazepam (RESTORIL) 30 MG capsule; Take 1 capsule by mouth At Night As Needed for Sleep.  Dispense: 30 capsule; Refill: 3    Other orders  -     levoFLOXacin (Levaquin) 500 MG tablet; Take 1 tablet by mouth Daily.  Dispense: 7 tablet; Refill: 0  -     dexamethasone (DECADRON) 4 MG tablet; Take 1 tablet by mouth 2 (Two) Times a Day With Meals.  Dispense: 14 tablet; Refill: 0        covid infection--finished zpak 10 days ago ---will rx with levaquin and decadron and temazpam for sleep,   Acute uri / bronchitis--  Acute sinusitis   Fatigue severe, cont zinc, vit c , ok to restart restoril       Follow Up   No follow-ups on file.  Patient was given instructions and counseling regarding her condition or for health maintenance advice. Please see specific information pulled into the AVS if appropriate.

## 2022-02-24 ENCOUNTER — OFFICE VISIT (OUTPATIENT)
Dept: CARDIOLOGY | Facility: CLINIC | Age: 72
End: 2022-02-24

## 2022-02-24 VITALS
HEART RATE: 96 BPM | DIASTOLIC BLOOD PRESSURE: 73 MMHG | WEIGHT: 175 LBS | SYSTOLIC BLOOD PRESSURE: 128 MMHG | HEIGHT: 64 IN | BODY MASS INDEX: 29.88 KG/M2

## 2022-02-24 DIAGNOSIS — Z95.0 PRESENCE OF CARDIAC PACEMAKER: ICD-10-CM

## 2022-02-24 DIAGNOSIS — I49.5 SSS (SICK SINUS SYNDROME): ICD-10-CM

## 2022-02-24 DIAGNOSIS — I10 ESSENTIAL HYPERTENSION: ICD-10-CM

## 2022-02-24 DIAGNOSIS — I48.0 PAF (PAROXYSMAL ATRIAL FIBRILLATION): Primary | ICD-10-CM

## 2022-02-24 PROCEDURE — 99214 OFFICE O/P EST MOD 30 MIN: CPT | Performed by: INTERNAL MEDICINE

## 2022-02-24 NOTE — ASSESSMENT & PLAN NOTE
Patient maintain normal sinus rhythm on pacemaker interrogation some brief ectopic tachycardic spells but symptomatically stable continuing on Lopressor 25 twice daily dosing.

## 2022-02-24 NOTE — PROGRESS NOTES
Chief Complaint  Atrial Fibrillation and Hypertension    Subjective    Patient has been doing well symptomatically she has had some brief tachycardia episodes but just lasting usually for few seconds no other complaints    Past Medical History:   Diagnosis Date   • Acid reflux    • Anemia    • Arthritis    • Asthma    • Atrial fibrillation (HCC)    • Atrial tachycardia (HCC)    • Benign essential hypertension    • Cancer (HCC)     skin   • Cervicalgia    • GERD (gastroesophageal reflux disease)    • Glaucoma    • Heart disease    • History of degenerative disc disease    • Hypertension    • Lumbago    • Muscle cramps    • Neuropathy    • Pacemaker     st julius    • PONV (postoperative nausea and vomiting)    • SOB (shortness of breath)    • SVT (supraventricular tachycardia) (HCC)    • Thyroid disease    • Thyroid disorder          Current Outpatient Medications:   •  albuterol sulfate  (90 Base) MCG/ACT inhaler, 2 puffs Daily As Needed., Disp: , Rfl:   •  apixaban (ELIQUIS) 5 MG tablet tablet, Take 1 tablet by mouth Every 12 (Twelve) Hours. Must keep appt for any additional refills, Disp: 60 tablet, Rfl: 0  •  Ascorbic Acid (BETITO-C PO), Take  by mouth., Disp: , Rfl:   •  baclofen (LIORESAL) 10 MG tablet, Take 10 mg by mouth 3 (Three) Times a Day As Needed., Disp: , Rfl:   •  budesonide-formoterol (SYMBICORT) 160-4.5 MCG/ACT inhaler, INHALE 2 PUFFS TWICE DAILY, Disp: 10.2 g, Rfl: 1  •  cyanocobalamin (VITAMIN B-12) 500 MCG tablet, Take  by mouth., Disp: , Rfl:   •  famotidine (PEPCID) 20 MG tablet, Take 20 mg by mouth Every Night., Disp: , Rfl:   •  metoprolol tartrate (LOPRESSOR) 25 MG tablet, Take 1 tablet by mouth 2 (Two) Times a Day., Disp: 180 tablet, Rfl: 0  •  olmesartan-hydrochlorothiazide (BENICAR HCT) 40-12.5 MG per tablet, Take 1 tablet by mouth Daily., Disp: 90 tablet, Rfl: 3  •  Omeprazole Magnesium (PRILOSEC OTC PO), Take 20 mg by mouth Daily., Disp: , Rfl:   •  traMADol (ULTRAM) 50 MG tablet,  "Take 1 tablet by mouth Every 6 (Six) Hours As Needed for Moderate Pain ., Disp: 90 tablet, Rfl: 2  •  Zinc Sulfate (ZINC-220 PO), Take  by mouth., Disp: , Rfl:     Medications Discontinued During This Encounter   Medication Reason   • azithromycin (Zithromax Z-Donte) 250 MG tablet *Therapy completed   • celecoxib (CeleBREX) 200 MG capsule *Therapy completed   • dexamethasone (DECADRON) 4 MG tablet *Therapy completed   • dexamethasone (DECADRON) 6 MG tablet *Therapy completed   • Fluticasone-Umeclidin-Vilant (Trelegy Ellipta) 100-62.5-25 MCG/INH inhaler *Therapy completed   • folic acid (FOLVITE) 1 MG tablet *Therapy completed   • furosemide (LASIX) 20 MG tablet *Therapy completed   • ipratropium-albuterol (DUO-NEB) 0.5-2.5 mg/3 ml nebulizer *Therapy completed   • leflunomide (ARAVA) 10 MG tablet *Therapy completed   • levoFLOXacin (Levaquin) 500 MG tablet *Therapy completed   • methotrexate 2.5 MG tablet *Therapy completed   • temazepam (RESTORIL) 30 MG capsule *Therapy completed     Allergies   Allergen Reactions   • Doxycycline Hives     .   • Lariam [Mefloquine] Hives     .   • Lisinopril Hives     .   • Talwin [Pentazocine] Hallucinations        Social History     Tobacco Use   • Smoking status: Never Smoker   • Smokeless tobacco: Never Used   Vaping Use   • Vaping Use: Never used   Substance Use Topics   • Alcohol use: No   • Drug use: Never       Family History   Problem Relation Age of Onset   • Heart attack Mother    • Heart disease Mother    • Diabetes Mother    • Arthritis Mother    • Heart attack Father    • Coronary artery disease Father    • Heart disease Father    • Diabetes Father    • Rectal cancer Brother 30   • Heart disease Brother    • Diabetes Brother    • Arthritis Brother    • Malig Hyperthermia Neg Hx         Objective     /73   Pulse 96   Ht 162.6 cm (64\")   Wt 79.4 kg (175 lb)   BMI 30.04 kg/m²       Physical Exam    General Appearance:   · no acute distress  · Alert and oriented " x3  HENT:   · lips not cyanotic  · Atraumatic  Neck:  · No jvd   · supple  Respiratory:  · no respiratory distress  · normal breath sounds  · no rales  Cardiovascular:  · Regular rate and rhythm  · no S3, no S4   · no murmur  · no rub  Extremities  · No cyanosis  · lower extremity edema: none    Skin:   · warm, dry  · No rashes      Result Review :     No results found for: PROBNP  CMP    CMP 9/20/21 10/1/21 12/28/21   Glucose 101 (A) 99 101 (A)   BUN 15 17 14   Creatinine 0.87 0.76 0.96   eGFR Non  Am 64 75 57 (A)   Sodium 138 140 141   Potassium 4.6 4.1 4.5   Chloride 101 106 106   Calcium 9.9 9.6 9.7   Albumin 4.60 4.40 4.20   Total Bilirubin 0.5 0.7 0.6   Alkaline Phosphatase 81 68 70   AST (SGOT) 15 19 26   ALT (SGPT) 13 18 21   (A) Abnormal value            CBC w/diff    CBC w/Diff 9/20/21 10/1/21 12/28/21   WBC 6.91 7.58 6.36   RBC 5.72 (A) 5.13 6.07 (A)   Hemoglobin 11.4 (A) 10.2 (A) 11.7 (A)   Hematocrit 38.3 33.3 (A) 38.4   MCV 67.0 (A) 64.9 (A) 63.3 (A)   MCH 19.9 (A) 19.9 (A) 19.3 (A)   MCHC 29.8 (A) 30.6 (A) 30.5 (A)   RDW 19.5 (A) 18.0 (A) 19.2 (A)   Platelets 224 217 189   Neutrophil Rel %   63.6   Lymphocyte Rel %   18.9 (A)   Monocyte Rel %   11.8   Eosinophil Rel %   3.6   Basophil Rel %   1.6 (A)   (A) Abnormal value             Lab Results   Component Value Date    TSH 2.290 07/01/2021      Lab Results   Component Value Date    FREET4 1.2 03/22/2021      No results found for: DDIMERQUANT  No results found for: MG   No results found for: DIGOXIN   No results found for: TROPONINT        Lipid Panel    Lipid Panel 7/1/21   Total Cholesterol 191   Triglycerides 59   HDL Cholesterol 53   VLDL Cholesterol 11   LDL Cholesterol  127 (A)   LDL/HDL Ratio 2.38   (A) Abnormal value            No results found for: POCTROP                   Diagnoses and all orders for this visit:    1. PAF (paroxysmal atrial fibrillation) (HCC) (Primary)  Assessment & Plan:  Patient maintain normal sinus rhythm on  pacemaker interrogation some brief ectopic tachycardic spells but symptomatically stable continuing on Lopressor 25 twice daily dosing.      2. SSS (sick sinus syndrome) (formerly [facility])  Assessment & Plan:  Symptomatically stable pacemaker working properly continue with remote interrogations      3. Presence of cardiac pacemaker    4. Essential hypertension  Assessment & Plan:  Blood pressures well controlled continue on olmesartan hydrochlorthiazide 40/12.5 and Lopressor 25 twice daily dosing            Follow Up     Return in about 6 months (around 8/24/2022).          Patient was given instructions and counseling regarding her condition or for health maintenance advice. Please see specific information pulled into the AVS if appropriate.

## 2022-02-24 NOTE — ASSESSMENT & PLAN NOTE
Blood pressures well controlled continue on olmesartan hydrochlorthiazide 40/12.5 and Lopressor 25 twice daily dosing

## 2022-03-11 ENCOUNTER — LAB (OUTPATIENT)
Dept: LAB | Facility: HOSPITAL | Age: 72
End: 2022-03-11

## 2022-03-11 DIAGNOSIS — M80.08XD FRACTURE OF VERTEBRA DUE TO OSTEOPOROSIS WITH ROUTINE HEALING, SUBSEQUENT ENCOUNTER: ICD-10-CM

## 2022-03-11 DIAGNOSIS — Z98.890 H/O CARDIAC RADIOFREQUENCY ABLATION: ICD-10-CM

## 2022-03-11 DIAGNOSIS — I10 ESSENTIAL HYPERTENSION: ICD-10-CM

## 2022-03-11 DIAGNOSIS — I48.0 PAROXYSMAL ATRIAL FIBRILLATION: ICD-10-CM

## 2022-03-11 DIAGNOSIS — D50.9 IRON DEFICIENCY ANEMIA, UNSPECIFIED IRON DEFICIENCY ANEMIA TYPE: ICD-10-CM

## 2022-03-11 DIAGNOSIS — I48.91 ATRIAL FIBRILLATION, UNSPECIFIED TYPE: ICD-10-CM

## 2022-03-11 DIAGNOSIS — I49.5 SSS (SICK SINUS SYNDROME): ICD-10-CM

## 2022-03-11 DIAGNOSIS — E04.1 THYROID NODULE GREATER THAN OR EQUAL TO 1 CM IN DIAMETER INCIDENTALLY NOTED ON IMAGING STUDY: ICD-10-CM

## 2022-03-11 LAB
ALBUMIN SERPL-MCNC: 4.3 G/DL (ref 3.5–5.2)
ALBUMIN/GLOB SERPL: 2.2 G/DL
ALP SERPL-CCNC: 73 U/L (ref 39–117)
ALT SERPL W P-5'-P-CCNC: 16 U/L (ref 1–33)
ANION GAP SERPL CALCULATED.3IONS-SCNC: 11.8 MMOL/L (ref 5–15)
AST SERPL-CCNC: 19 U/L (ref 1–32)
BILIRUB SERPL-MCNC: 0.7 MG/DL (ref 0–1.2)
BUN SERPL-MCNC: 14 MG/DL (ref 8–23)
BUN/CREAT SERPL: 18.2 (ref 7–25)
CALCIUM SPEC-SCNC: 9.9 MG/DL (ref 8.6–10.5)
CHLORIDE SERPL-SCNC: 101 MMOL/L (ref 98–107)
CO2 SERPL-SCNC: 25.2 MMOL/L (ref 22–29)
CREAT SERPL-MCNC: 0.77 MG/DL (ref 0.57–1)
EGFRCR SERPLBLD CKD-EPI 2021: 81.6 ML/MIN/1.73
GLOBULIN UR ELPH-MCNC: 2 GM/DL
GLUCOSE SERPL-MCNC: 99 MG/DL (ref 65–99)
POTASSIUM SERPL-SCNC: 4.2 MMOL/L (ref 3.5–5.2)
PROT SERPL-MCNC: 6.3 G/DL (ref 6–8.5)
SODIUM SERPL-SCNC: 138 MMOL/L (ref 136–145)
T4 FREE SERPL-MCNC: 1.22 NG/DL (ref 0.93–1.7)
TSH SERPL DL<=0.05 MIU/L-ACNC: 2.53 UIU/ML (ref 0.27–4.2)

## 2022-03-11 PROCEDURE — 36415 COLL VENOUS BLD VENIPUNCTURE: CPT

## 2022-03-11 PROCEDURE — 80053 COMPREHEN METABOLIC PANEL: CPT

## 2022-03-11 PROCEDURE — 84443 ASSAY THYROID STIM HORMONE: CPT

## 2022-03-11 PROCEDURE — 84439 ASSAY OF FREE THYROXINE: CPT

## 2022-03-14 RX ORDER — APIXABAN 5 MG/1
TABLET, FILM COATED ORAL
Qty: 180 TABLET | Refills: 3 | Status: SHIPPED | OUTPATIENT
Start: 2022-03-14 | End: 2022-12-12

## 2022-03-16 ENCOUNTER — OFFICE VISIT (OUTPATIENT)
Dept: INTERNAL MEDICINE | Facility: CLINIC | Age: 72
End: 2022-03-16

## 2022-03-16 VITALS
BODY MASS INDEX: 30.52 KG/M2 | HEART RATE: 97 BPM | WEIGHT: 178.8 LBS | OXYGEN SATURATION: 97 % | SYSTOLIC BLOOD PRESSURE: 136 MMHG | TEMPERATURE: 98.1 F | DIASTOLIC BLOOD PRESSURE: 84 MMHG | HEIGHT: 64 IN

## 2022-03-16 DIAGNOSIS — I49.5 SSS (SICK SINUS SYNDROME): ICD-10-CM

## 2022-03-16 DIAGNOSIS — M19.90 ARTHRITIS: ICD-10-CM

## 2022-03-16 DIAGNOSIS — Z12.31 SCREENING MAMMOGRAM FOR BREAST CANCER: ICD-10-CM

## 2022-03-16 DIAGNOSIS — D50.9 IRON DEFICIENCY ANEMIA, UNSPECIFIED IRON DEFICIENCY ANEMIA TYPE: ICD-10-CM

## 2022-03-16 DIAGNOSIS — E04.1 NONTOXIC SINGLE THYROID NODULE: ICD-10-CM

## 2022-03-16 DIAGNOSIS — E55.9 VITAMIN D DEFICIENCY: ICD-10-CM

## 2022-03-16 DIAGNOSIS — R25.2 MUSCLE CRAMPS: ICD-10-CM

## 2022-03-16 DIAGNOSIS — G62.9 NEUROPATHY: ICD-10-CM

## 2022-03-16 DIAGNOSIS — Z95.0 PRESENCE OF CARDIAC PACEMAKER: ICD-10-CM

## 2022-03-16 DIAGNOSIS — I48.91 ATRIAL FIBRILLATION, UNSPECIFIED TYPE: ICD-10-CM

## 2022-03-16 DIAGNOSIS — E78.2 MIXED HYPERLIPIDEMIA: ICD-10-CM

## 2022-03-16 DIAGNOSIS — I48.0 PAF (PAROXYSMAL ATRIAL FIBRILLATION): Primary | ICD-10-CM

## 2022-03-16 DIAGNOSIS — I10 ESSENTIAL HYPERTENSION: ICD-10-CM

## 2022-03-16 DIAGNOSIS — K21.9 GASTRO-ESOPHAGEAL REFLUX DISEASE WITHOUT ESOPHAGITIS: ICD-10-CM

## 2022-03-16 DIAGNOSIS — M54.2 NECK PAIN: ICD-10-CM

## 2022-03-16 PROCEDURE — 99214 OFFICE O/P EST MOD 30 MIN: CPT | Performed by: INTERNAL MEDICINE

## 2022-03-16 RX ORDER — CELECOXIB 200 MG/1
200 CAPSULE ORAL DAILY
Qty: 90 CAPSULE | Refills: 3 | Status: SHIPPED | OUTPATIENT
Start: 2022-03-16 | End: 2023-03-01

## 2022-03-16 RX ORDER — BACLOFEN 10 MG/1
10 TABLET ORAL 3 TIMES DAILY
Qty: 90 TABLET | Refills: 5 | Status: SHIPPED | OUTPATIENT
Start: 2022-03-16

## 2022-03-16 NOTE — PROGRESS NOTES
"Chief Complaint/ HPI: f/u with fatigue and arthritis   Feeling better, energy better,   Patient is doing better with her heart, she says most the time is in sinus rhythm occasionally is paced, and occasionally gets a little fast,  Covid infection is resolving, improving since January 2022,        Objective   Vital Signs  Vitals:    03/16/22 1338   BP: 136/84   Pulse: 97   Temp: 98.1 °F (36.7 °C)   SpO2: 97%   Weight: 81.1 kg (178 lb 12.8 oz)   Height: 162.6 cm (64.02\")      Body mass index is 30.68 kg/m².  Review of Systems   Constitutional: Negative.    HENT: Negative.    Eyes: Negative.    Respiratory: Negative.    Cardiovascular: Negative.    Gastrointestinal: Negative.    Endocrine: Negative.    Genitourinary: Negative.    Musculoskeletal: Negative.    Allergic/Immunologic: Negative.    Neurological: Negative.    Hematological: Negative.    Psychiatric/Behavioral: Negative.       Physical Exam  Constitutional:       General: She is not in acute distress.     Appearance: Normal appearance.   HENT:      Head: Normocephalic.      Mouth/Throat:      Mouth: Mucous membranes are moist.   Eyes:      Conjunctiva/sclera: Conjunctivae normal.      Pupils: Pupils are equal, round, and reactive to light.   Cardiovascular:      Rate and Rhythm: Normal rate and regular rhythm.      Pulses: Normal pulses.      Heart sounds: Normal heart sounds.   Pulmonary:      Effort: Pulmonary effort is normal.      Breath sounds: Normal breath sounds.   Abdominal:      General: Abdomen is flat. Bowel sounds are normal.      Palpations: Abdomen is soft.   Musculoskeletal:         General: No swelling. Normal range of motion.      Cervical back: Neck supple.   Skin:     General: Skin is warm and dry.      Coloration: Skin is not jaundiced.   Neurological:      General: No focal deficit present.      Mental Status: She is alert and oriented to person, place, and time. Mental status is at baseline.   Psychiatric:         Mood and Affect: Mood " normal.         Behavior: Behavior normal.         Thought Content: Thought content normal.         Judgment: Judgment normal.        Result Review :   Lab Results   Component Value Date     04/30/2019     CMP    CMP 10/1/21 12/28/21 3/11/22   Glucose 99 101 (A) 99   BUN 17 14 14   Creatinine 0.76 0.96 0.77   eGFR Non African Am 75 57 (A)    Sodium 140 141 138   Potassium 4.1 4.5 4.2   Chloride 106 106 101   Calcium 9.6 9.7 9.9   Albumin 4.40 4.20 4.30   Total Bilirubin 0.7 0.6 0.7   Alkaline Phosphatase 68 70 73   AST (SGOT) 19 26 19   ALT (SGPT) 18 21 16   (A) Abnormal value            CBC w/diff    CBC w/Diff 9/20/21 10/1/21 12/28/21   WBC 6.91 7.58 6.36   RBC 5.72 (A) 5.13 6.07 (A)   Hemoglobin 11.4 (A) 10.2 (A) 11.7 (A)   Hematocrit 38.3 33.3 (A) 38.4   MCV 67.0 (A) 64.9 (A) 63.3 (A)   MCH 19.9 (A) 19.9 (A) 19.3 (A)   MCHC 29.8 (A) 30.6 (A) 30.5 (A)   RDW 19.5 (A) 18.0 (A) 19.2 (A)   Platelets 224 217 189   Neutrophil Rel %   63.6   Lymphocyte Rel %   18.9 (A)   Monocyte Rel %   11.8   Eosinophil Rel %   3.6   Basophil Rel %   1.6 (A)   (A) Abnormal value             Lipid Panel    Lipid Panel 7/1/21   Total Cholesterol 191   Triglycerides 59   HDL Cholesterol 53   VLDL Cholesterol 11   LDL Cholesterol  127 (A)   LDL/HDL Ratio 2.38   (A) Abnormal value             Lab Results   Component Value Date    TSH 2.530 03/11/2022    TSH 2.290 07/01/2021    TSH 1.770 05/10/2021      Lab Results   Component Value Date    FREET4 1.22 03/11/2022    FREET4 1.2 03/22/2021    FREET4 1.0 12/03/2020                          Visit Diagnoses:    ICD-10-CM ICD-9-CM   1. PAF (paroxysmal atrial fibrillation) (HCC)  I48.0 427.31   2. Essential hypertension  I10 401.9   3. Iron deficiency anemia, unspecified iron deficiency anemia type  D50.9 280.9   4. SSS (sick sinus syndrome) (HCC)  I49.5 427.81   5. Presence of cardiac pacemaker  Z95.0 V45.01   6. Gastro-esophageal reflux disease without esophagitis  K21.9 530.81   7.  Neuropathy  G62.9 355.9   8. Mixed hyperlipidemia  E78.2 272.2   9. Vitamin D deficiency  E55.9 268.9   10. Nontoxic single thyroid nodule  E04.1 241.0   11. Neck pain  M54.2 723.1   12. Muscle cramps  R25.2 729.82   13. Arthritis  M19.90 716.90   14. Atrial fibrillation, unspecified type (Former[facility])  I48.91 427.31   15. Screening mammogram for breast cancer  Z12.31 V76.12       Assessment and Plan   Diagnoses and all orders for this visit:    1. PAF (paroxysmal atrial fibrillation) (Former[facility]) (Primary)  -     TSH+Free T4; Future  -     Comprehensive Metabolic Panel; Future  -     CBC & Differential; Future  -     Sedimentation Rate; Future  -     Lipid Panel; Future  -     Mammo Screening Digital Tomosynthesis Bilateral With CAD; Future  -     US Thyroid; Future    2. Essential hypertension  -     TSH+Free T4; Future  -     Comprehensive Metabolic Panel; Future  -     CBC & Differential; Future  -     Sedimentation Rate; Future  -     Lipid Panel; Future  -     Mammo Screening Digital Tomosynthesis Bilateral With CAD; Future  -     US Thyroid; Future    3. Iron deficiency anemia, unspecified iron deficiency anemia type  -     TSH+Free T4; Future  -     Comprehensive Metabolic Panel; Future  -     CBC & Differential; Future  -     Sedimentation Rate; Future  -     Lipid Panel; Future  -     Mammo Screening Digital Tomosynthesis Bilateral With CAD; Future  -     US Thyroid; Future    4. SSS (sick sinus syndrome) (HCC)  -     TSH+Free T4; Future  -     Comprehensive Metabolic Panel; Future  -     CBC & Differential; Future  -     Sedimentation Rate; Future  -     Lipid Panel; Future  -     Mammo Screening Digital Tomosynthesis Bilateral With CAD; Future  -     US Thyroid; Future    5. Presence of cardiac pacemaker  -     TSH+Free T4; Future  -     Comprehensive Metabolic Panel; Future  -     CBC & Differential; Future  -     Sedimentation Rate; Future  -     Lipid Panel; Future  -     Mammo Screening Digital Tomosynthesis  Bilateral With CAD; Future  -     US Thyroid; Future    6. Gastro-esophageal reflux disease without esophagitis  -     TSH+Free T4; Future  -     Comprehensive Metabolic Panel; Future  -     CBC & Differential; Future  -     Sedimentation Rate; Future  -     Lipid Panel; Future  -     Mammo Screening Digital Tomosynthesis Bilateral With CAD; Future  -     US Thyroid; Future    7. Neuropathy  -     TSH+Free T4; Future  -     Comprehensive Metabolic Panel; Future  -     CBC & Differential; Future  -     Sedimentation Rate; Future  -     Lipid Panel; Future  -     Mammo Screening Digital Tomosynthesis Bilateral With CAD; Future  -     US Thyroid; Future    8. Mixed hyperlipidemia  -     TSH+Free T4; Future  -     Comprehensive Metabolic Panel; Future  -     CBC & Differential; Future  -     Sedimentation Rate; Future  -     Lipid Panel; Future  -     Mammo Screening Digital Tomosynthesis Bilateral With CAD; Future  -     US Thyroid; Future    9. Vitamin D deficiency  -     TSH+Free T4; Future  -     Comprehensive Metabolic Panel; Future  -     CBC & Differential; Future  -     Sedimentation Rate; Future  -     Lipid Panel; Future  -     Mammo Screening Digital Tomosynthesis Bilateral With CAD; Future  -     US Thyroid; Future    10. Nontoxic single thyroid nodule  -     TSH+Free T4; Future  -     Comprehensive Metabolic Panel; Future  -     CBC & Differential; Future  -     Sedimentation Rate; Future  -     Lipid Panel; Future  -     Mammo Screening Digital Tomosynthesis Bilateral With CAD; Future  -     US Thyroid; Future    11. Neck pain  -     TSH+Free T4; Future  -     Comprehensive Metabolic Panel; Future  -     CBC & Differential; Future  -     Sedimentation Rate; Future  -     Lipid Panel; Future  -     Mammo Screening Digital Tomosynthesis Bilateral With CAD; Future  -     US Thyroid; Future    12. Muscle cramps  -     TSH+Free T4; Future  -     Comprehensive Metabolic Panel; Future  -     CBC & Differential;  Future  -     Sedimentation Rate; Future  -     Lipid Panel; Future  -     Mammo Screening Digital Tomosynthesis Bilateral With CAD; Future  -     US Thyroid; Future    13. Arthritis  -     TSH+Free T4; Future  -     Comprehensive Metabolic Panel; Future  -     CBC & Differential; Future  -     Sedimentation Rate; Future  -     Lipid Panel; Future  -     Mammo Screening Digital Tomosynthesis Bilateral With CAD; Future  -     US Thyroid; Future    14. Atrial fibrillation, unspecified type (HCC)  -     TSH+Free T4; Future  -     Comprehensive Metabolic Panel; Future  -     CBC & Differential; Future  -     Sedimentation Rate; Future  -     Lipid Panel; Future  -     Mammo Screening Digital Tomosynthesis Bilateral With CAD; Future  -     US Thyroid; Future    15. Screening mammogram for breast cancer  -     TSH+Free T4; Future  -     Comprehensive Metabolic Panel; Future  -     CBC & Differential; Future  -     Sedimentation Rate; Future  -     Lipid Panel; Future  -     Mammo Screening Digital Tomosynthesis Bilateral With CAD; Future  -     US Thyroid; Future    Other orders  -     celecoxib (CeleBREX) 200 MG capsule; Take 1 capsule by mouth Daily.  Dispense: 90 capsule; Refill: 3  -     baclofen (LIORESAL) 10 MG tablet; Take 1 tablet by mouth 3 (Three) Times a Day.  Dispense: 90 tablet; Refill: 5  -     metoprolol tartrate (LOPRESSOR) 25 MG tablet; Take 1 tablet by mouth 2 (Two) Times a Day.  Dispense: 180 tablet; Refill: 3         Covid infection January 2022, discussed getting booster shots anytime at this point March 2022, patient symptoms have improved considerably fatigue was terrible,    acute T7 compression fracture after fall hitting her sternum with chest contusion, presyncope with head coontusion also and back pain, May 5 and sixth 2021, went to the emergency room, diagnosed with UTI,---, continues to slowly improve but still has pain, continues to use heating pad, September 2021--- will do pain management,      ARTHRITIS, IN HANDS , THUMBS , R FOOT, LOW BACK PAIN, NECK OCC.--Now with cervical radiculopathy C4-5 area, -- rheumatologist -----was diagnosed clinically with rheumatoid arthritis, CCP antibody and rheumatoid factors are negative on blood test per rheumatology September 2020,-----cont --tylenol arthritis , baclofen , arava  20mg qd ,   ---- rheumatology, okay to restart Celebrex, March 2022 patient will start Arava when she is finished her booster shot for Covid March 2022    A. Fib with rapid rate ---- electrophysiologist again, ablation due to atrial tachycardia July 29, 2019 Previous--aflutter, abalation march 8, 2019, and pacemaker done in [place] - SECOND ABALATION JULY 29, 2019, ---WORKED WELL ,-NOW IN PACED OR SINUS RHYTHM SEPTEMBER 2021      continues--ON METOPROLOL,25 BID , ELIQUIS 5 MG BID , LASIX PRN , PAXIL, SEROQUEL, IRON VIT C,     PAF, ABALATION AT [facility], JULY 2017,     HTN, --- continues, metoprolol 25 TWICE A DAY,  olmesartan 40/12.5 HCT,    ANXIETY, DEPRESSION-- OFF PAXIL ,     ANEMIA, THALLESEMIA- STABLE- hemoglobin 11.4, September 2021 improved stable     Glaucoma, laser treatment of the left eye, August 2021, and macular tear in the right eye, has been seeing surgery and ophthalmology as of September 2021    R TKA, 5/13    HEART MURMUR, -aortic regurgitation moderate by echo February 2016, will follow with cardiology    GERD- , EGD JUNE 2018, ERIK MARIE-- cont Prilosec a.m., Pepcid nightly,     Previous cholecystectomy, previous left total knee replacement 2010, previous right total hip replacement 2008, JACINTO and BSO    R KNEE REVISION 6/2414 , WITH DR JOY    thryoid cyst, nodule -previous thyroid ultrasounds, March 2014 and April 2016 October 2020 shows large mass in the left thyroid completely cystic, measures 7.2 cm previously measuring 6.1 cm, a 1 cm hypoechoic right thyroid nodule measuring up to 0.6 cm previously, and another smaller nodule at 0.6 cm in the right  lower lobe of the thyroid,----thyroid ultrasound October 7, 2021 shows bilateral thyroid nodules large left cystic thyroid nodule has increased in size benign characteristics follow-up again in 1 year the cyst measures 8.6 x 3 cm slightly increased in size consider ENT follow-up drainage, second opinion, versus watchful waiting,    We will schedule mammogram March 2022        Follow Up   Return in about 6 months (around 9/16/2022).  Patient was given instructions and counseling regarding her condition or for health maintenance advice. Please see specific information pulled into the AVS if appropriate.

## 2022-03-17 ENCOUNTER — CLINICAL SUPPORT (OUTPATIENT)
Dept: INTERNAL MEDICINE | Facility: CLINIC | Age: 72
End: 2022-03-17

## 2022-03-17 DIAGNOSIS — Z23 NEED FOR VACCINATION: Primary | ICD-10-CM

## 2022-03-17 PROCEDURE — 91305 COVID-19 (PFIZER) 12+ YRS: CPT | Performed by: INTERNAL MEDICINE

## 2022-03-17 PROCEDURE — 0053A COVID-19 (PFIZER) 12+ YRS: CPT | Performed by: INTERNAL MEDICINE

## 2022-05-25 ENCOUNTER — HOSPITAL ENCOUNTER (OUTPATIENT)
Dept: MAMMOGRAPHY | Facility: HOSPITAL | Age: 72
Discharge: HOME | End: 2022-05-25

## 2022-05-25 ENCOUNTER — HOSPITAL ENCOUNTER (OUTPATIENT)
Dept: ULTRASOUND IMAGING | Facility: HOSPITAL | Age: 72
Discharge: HOME | End: 2022-05-25

## 2022-05-25 DIAGNOSIS — I48.0 PAF (PAROXYSMAL ATRIAL FIBRILLATION): ICD-10-CM

## 2022-05-25 DIAGNOSIS — M19.90 ARTHRITIS: ICD-10-CM

## 2022-05-25 DIAGNOSIS — E55.9 VITAMIN D DEFICIENCY: ICD-10-CM

## 2022-05-25 DIAGNOSIS — Z12.31 SCREENING MAMMOGRAM FOR BREAST CANCER: ICD-10-CM

## 2022-05-25 DIAGNOSIS — E04.1 NONTOXIC SINGLE THYROID NODULE: ICD-10-CM

## 2022-05-25 DIAGNOSIS — G62.9 NEUROPATHY: ICD-10-CM

## 2022-05-25 DIAGNOSIS — D50.9 IRON DEFICIENCY ANEMIA, UNSPECIFIED IRON DEFICIENCY ANEMIA TYPE: ICD-10-CM

## 2022-05-25 DIAGNOSIS — I10 ESSENTIAL HYPERTENSION: ICD-10-CM

## 2022-05-25 DIAGNOSIS — Z95.0 PRESENCE OF CARDIAC PACEMAKER: ICD-10-CM

## 2022-05-25 DIAGNOSIS — I48.91 ATRIAL FIBRILLATION, UNSPECIFIED TYPE: ICD-10-CM

## 2022-05-25 DIAGNOSIS — E78.2 MIXED HYPERLIPIDEMIA: ICD-10-CM

## 2022-05-25 DIAGNOSIS — M54.2 NECK PAIN: ICD-10-CM

## 2022-05-25 DIAGNOSIS — K21.9 GASTRO-ESOPHAGEAL REFLUX DISEASE WITHOUT ESOPHAGITIS: ICD-10-CM

## 2022-05-25 DIAGNOSIS — R25.2 MUSCLE CRAMPS: ICD-10-CM

## 2022-05-25 DIAGNOSIS — I49.5 SSS (SICK SINUS SYNDROME): ICD-10-CM

## 2022-05-25 PROCEDURE — 77063 BREAST TOMOSYNTHESIS BI: CPT

## 2022-05-25 PROCEDURE — 77067 SCR MAMMO BI INCL CAD: CPT

## 2022-05-25 PROCEDURE — 76536 US EXAM OF HEAD AND NECK: CPT

## 2022-07-25 RX ORDER — BUDESONIDE AND FORMOTEROL FUMARATE DIHYDRATE 160; 4.5 UG/1; UG/1
AEROSOL RESPIRATORY (INHALATION)
Qty: 10.2 G | Refills: 1 | Status: SHIPPED | OUTPATIENT
Start: 2022-07-25 | End: 2023-03-29 | Stop reason: SDUPTHER

## 2022-08-15 ENCOUNTER — PREP FOR SURGERY (OUTPATIENT)
Dept: OTHER | Facility: HOSPITAL | Age: 72
End: 2022-08-15

## 2022-08-15 DIAGNOSIS — D68.9 COAGULATION DEFECT, UNSPECIFIED: ICD-10-CM

## 2022-08-15 DIAGNOSIS — H90.0 CONDUCTIVE HEARING LOSS, BILATERAL: ICD-10-CM

## 2022-08-15 DIAGNOSIS — H69.83 EUSTACHIAN TUBE DYSFUNCTION, BILATERAL: Primary | ICD-10-CM

## 2022-08-15 DIAGNOSIS — Z01.818 PREOP TESTING: Primary | ICD-10-CM

## 2022-08-17 PROBLEM — H90.0 CONDUCTIVE HEARING LOSS, BILATERAL: Status: ACTIVE | Noted: 2022-08-17

## 2022-08-17 PROBLEM — H69.93 EUSTACHIAN TUBE DYSFUNCTION, BILATERAL: Status: ACTIVE | Noted: 2022-08-17

## 2022-08-17 PROBLEM — H69.83 EUSTACHIAN TUBE DYSFUNCTION, BILATERAL: Status: ACTIVE | Noted: 2022-08-17

## 2022-08-23 ENCOUNTER — LAB (OUTPATIENT)
Dept: LAB | Facility: HOSPITAL | Age: 72
End: 2022-08-23

## 2022-08-23 ENCOUNTER — HOSPITAL ENCOUNTER (OUTPATIENT)
Dept: GENERAL RADIOLOGY | Facility: HOSPITAL | Age: 72
Discharge: HOME | End: 2022-08-23

## 2022-08-23 DIAGNOSIS — I48.0 PAF (PAROXYSMAL ATRIAL FIBRILLATION): ICD-10-CM

## 2022-08-23 DIAGNOSIS — I49.5 SSS (SICK SINUS SYNDROME): ICD-10-CM

## 2022-08-23 DIAGNOSIS — Z01.818 PREOP TESTING: ICD-10-CM

## 2022-08-23 DIAGNOSIS — M54.2 NECK PAIN: ICD-10-CM

## 2022-08-23 DIAGNOSIS — E78.2 MIXED HYPERLIPIDEMIA: ICD-10-CM

## 2022-08-23 DIAGNOSIS — E55.9 VITAMIN D DEFICIENCY: ICD-10-CM

## 2022-08-23 DIAGNOSIS — D50.9 IRON DEFICIENCY ANEMIA, UNSPECIFIED IRON DEFICIENCY ANEMIA TYPE: ICD-10-CM

## 2022-08-23 DIAGNOSIS — Z95.0 PRESENCE OF CARDIAC PACEMAKER: ICD-10-CM

## 2022-08-23 DIAGNOSIS — Z12.31 SCREENING MAMMOGRAM FOR BREAST CANCER: ICD-10-CM

## 2022-08-23 DIAGNOSIS — D68.9 COAGULATION DEFECT, UNSPECIFIED: ICD-10-CM

## 2022-08-23 DIAGNOSIS — I48.91 ATRIAL FIBRILLATION, UNSPECIFIED TYPE: ICD-10-CM

## 2022-08-23 DIAGNOSIS — K21.9 GASTRO-ESOPHAGEAL REFLUX DISEASE WITHOUT ESOPHAGITIS: ICD-10-CM

## 2022-08-23 DIAGNOSIS — G62.9 NEUROPATHY: ICD-10-CM

## 2022-08-23 DIAGNOSIS — M19.90 ARTHRITIS: ICD-10-CM

## 2022-08-23 DIAGNOSIS — I10 ESSENTIAL HYPERTENSION: ICD-10-CM

## 2022-08-23 DIAGNOSIS — R25.2 MUSCLE CRAMPS: ICD-10-CM

## 2022-08-23 DIAGNOSIS — E04.1 NONTOXIC SINGLE THYROID NODULE: ICD-10-CM

## 2022-08-23 LAB
ALBUMIN SERPL-MCNC: 4.6 G/DL (ref 3.5–5.2)
ALBUMIN/GLOB SERPL: 2.7 G/DL
ALP SERPL-CCNC: 63 U/L (ref 39–117)
ALT SERPL W P-5'-P-CCNC: 12 U/L (ref 1–33)
ANION GAP SERPL CALCULATED.3IONS-SCNC: 9 MMOL/L (ref 5–15)
APTT PPP: 35.5 SECONDS (ref 24.2–34.2)
AST SERPL-CCNC: 16 U/L (ref 1–32)
BASOPHILS # BLD AUTO: 0.1 10*3/MM3 (ref 0–0.2)
BASOPHILS NFR BLD AUTO: 1.3 % (ref 0–1.5)
BILIRUB SERPL-MCNC: 0.6 MG/DL (ref 0–1.2)
BUN SERPL-MCNC: 21 MG/DL (ref 8–23)
BUN/CREAT SERPL: 23.6 (ref 7–25)
CALCIUM SPEC-SCNC: 9.5 MG/DL (ref 8.6–10.5)
CHLORIDE SERPL-SCNC: 103 MMOL/L (ref 98–107)
CHOLEST SERPL-MCNC: 188 MG/DL (ref 0–200)
CO2 SERPL-SCNC: 27 MMOL/L (ref 22–29)
CREAT SERPL-MCNC: 0.89 MG/DL (ref 0.57–1)
DEPRECATED RDW RBC AUTO: 39.2 FL (ref 37–54)
EGFRCR SERPLBLD CKD-EPI 2021: 68.6 ML/MIN/1.73
EOSINOPHIL # BLD AUTO: 0.37 10*3/MM3 (ref 0–0.4)
EOSINOPHIL NFR BLD AUTO: 4.9 % (ref 0.3–6.2)
ERYTHROCYTE [DISTWIDTH] IN BLOOD BY AUTOMATED COUNT: 19 % (ref 12.3–15.4)
ERYTHROCYTE [SEDIMENTATION RATE] IN BLOOD: 8 MM/HR (ref 0–30)
GLOBULIN UR ELPH-MCNC: 1.7 GM/DL
GLUCOSE SERPL-MCNC: 105 MG/DL (ref 65–99)
HCT VFR BLD AUTO: 37.3 % (ref 34–46.6)
HDLC SERPL-MCNC: 48 MG/DL (ref 40–60)
HGB BLD-MCNC: 11.3 G/DL (ref 12–15.9)
INR PPP: 1.08 (ref 0.86–1.15)
LDLC SERPL CALC-MCNC: 119 MG/DL (ref 0–100)
LDLC/HDLC SERPL: 2.44 {RATIO}
LYMPHOCYTES # BLD AUTO: 1.3 10*3/MM3 (ref 0.7–3.1)
LYMPHOCYTES NFR BLD AUTO: 17.2 % (ref 19.6–45.3)
MCH RBC QN AUTO: 19.2 PG (ref 26.6–33)
MCHC RBC AUTO-ENTMCNC: 30.3 G/DL (ref 31.5–35.7)
MCV RBC AUTO: 63.4 FL (ref 79–97)
MONOCYTES # BLD AUTO: 0.7 10*3/MM3 (ref 0.1–0.9)
MONOCYTES NFR BLD AUTO: 9.2 % (ref 5–12)
NEUTROPHILS NFR BLD AUTO: 5.09 10*3/MM3 (ref 1.7–7)
NEUTROPHILS NFR BLD AUTO: 67.1 % (ref 42.7–76)
PLATELET # BLD AUTO: 206 10*3/MM3 (ref 140–450)
POTASSIUM SERPL-SCNC: 4.3 MMOL/L (ref 3.5–5.2)
PROT SERPL-MCNC: 6.3 G/DL (ref 6–8.5)
PROTHROMBIN TIME: 14.1 SECONDS (ref 11.8–14.9)
RBC # BLD AUTO: 5.88 10*6/MM3 (ref 3.77–5.28)
SODIUM SERPL-SCNC: 139 MMOL/L (ref 136–145)
T4 FREE SERPL-MCNC: 1.28 NG/DL (ref 0.93–1.7)
TRIGL SERPL-MCNC: 115 MG/DL (ref 0–150)
TSH SERPL DL<=0.05 MIU/L-ACNC: 2.59 UIU/ML (ref 0.27–4.2)
VLDLC SERPL-MCNC: 21 MG/DL (ref 5–40)
WBC NRBC COR # BLD: 7.58 10*3/MM3 (ref 3.4–10.8)

## 2022-08-23 PROCEDURE — 84443 ASSAY THYROID STIM HORMONE: CPT

## 2022-08-23 PROCEDURE — 84439 ASSAY OF FREE THYROXINE: CPT

## 2022-08-23 PROCEDURE — 36415 COLL VENOUS BLD VENIPUNCTURE: CPT

## 2022-08-23 PROCEDURE — 85025 COMPLETE CBC W/AUTO DIFF WBC: CPT

## 2022-08-23 PROCEDURE — 80053 COMPREHEN METABOLIC PANEL: CPT

## 2022-08-23 PROCEDURE — 71046 X-RAY EXAM CHEST 2 VIEWS: CPT

## 2022-08-23 PROCEDURE — 85610 PROTHROMBIN TIME: CPT

## 2022-08-23 PROCEDURE — 85652 RBC SED RATE AUTOMATED: CPT

## 2022-08-23 PROCEDURE — 80061 LIPID PANEL: CPT

## 2022-08-23 PROCEDURE — 85730 THROMBOPLASTIN TIME PARTIAL: CPT

## 2022-08-29 ENCOUNTER — ANESTHESIA EVENT (OUTPATIENT)
Dept: PERIOP | Facility: HOSPITAL | Age: 72
End: 2022-08-29

## 2022-08-30 ENCOUNTER — HOSPITAL ENCOUNTER (OUTPATIENT)
Facility: HOSPITAL | Age: 72
Setting detail: HOSPITAL OUTPATIENT SURGERY
Discharge: HOME | End: 2022-08-30
Attending: OTOLARYNGOLOGY | Admitting: OTOLARYNGOLOGY

## 2022-08-30 ENCOUNTER — ANESTHESIA (OUTPATIENT)
Dept: PERIOP | Facility: HOSPITAL | Age: 72
End: 2022-08-30

## 2022-08-30 VITALS
RESPIRATION RATE: 20 BRPM | BODY MASS INDEX: 29.88 KG/M2 | OXYGEN SATURATION: 98 % | SYSTOLIC BLOOD PRESSURE: 147 MMHG | WEIGHT: 175.04 LBS | TEMPERATURE: 98 F | HEIGHT: 64 IN | DIASTOLIC BLOOD PRESSURE: 83 MMHG | HEART RATE: 70 BPM

## 2022-08-30 PROBLEM — H90.0 CONDUCTIVE HEARING LOSS, BILATERAL: Status: RESOLVED | Noted: 2022-08-17 | Resolved: 2022-08-30

## 2022-08-30 LAB — QT INTERVAL: 444 MS

## 2022-08-30 PROCEDURE — 93005 ELECTROCARDIOGRAM TRACING: CPT | Performed by: OTOLARYNGOLOGY

## 2022-08-30 PROCEDURE — 25010000002 MIDAZOLAM PER 1 MG: Performed by: ANESTHESIOLOGY

## 2022-08-30 PROCEDURE — 25010000002 DEXAMETHASONE PER 1 MG: Performed by: ANESTHESIOLOGY

## 2022-08-30 PROCEDURE — 25010000002 ONDANSETRON PER 1 MG: Performed by: NURSE ANESTHETIST, CERTIFIED REGISTERED

## 2022-08-30 PROCEDURE — 93010 ELECTROCARDIOGRAM REPORT: CPT | Performed by: INTERNAL MEDICINE

## 2022-08-30 PROCEDURE — 25010000002 PROPOFOL 10 MG/ML EMULSION: Performed by: NURSE ANESTHETIST, CERTIFIED REGISTERED

## 2022-08-30 PROCEDURE — 25010000002 KETOROLAC TROMETHAMINE PER 15 MG: Performed by: NURSE ANESTHETIST, CERTIFIED REGISTERED

## 2022-08-30 PROCEDURE — 25010000002 HYDROMORPHONE 1 MG/ML SOLUTION: Performed by: NURSE ANESTHETIST, CERTIFIED REGISTERED

## 2022-08-30 PROCEDURE — C1889 IMPLANT/INSERT DEVICE, NOC: HCPCS | Performed by: OTOLARYNGOLOGY

## 2022-08-30 DEVICE — VENT TUBE 1013303 50PK BUTTON 1.27 FLPL
Type: IMPLANTABLE DEVICE | Site: EAR | Status: FUNCTIONAL
Brand: SHEEHY

## 2022-08-30 RX ORDER — ONDANSETRON 2 MG/ML
INJECTION INTRAMUSCULAR; INTRAVENOUS AS NEEDED
Status: DISCONTINUED | OUTPATIENT
Start: 2022-08-30 | End: 2022-08-30 | Stop reason: SURG

## 2022-08-30 RX ORDER — MAGNESIUM HYDROXIDE 1200 MG/15ML
LIQUID ORAL AS NEEDED
Status: DISCONTINUED | OUTPATIENT
Start: 2022-08-30 | End: 2022-08-30 | Stop reason: HOSPADM

## 2022-08-30 RX ORDER — ONDANSETRON 2 MG/ML
4 INJECTION INTRAMUSCULAR; INTRAVENOUS ONCE AS NEEDED
Status: DISCONTINUED | OUTPATIENT
Start: 2022-08-30 | End: 2022-08-30 | Stop reason: HOSPADM

## 2022-08-30 RX ORDER — OFLOXACIN 3 MG/ML
5 SOLUTION AURICULAR (OTIC) 2 TIMES DAILY
Qty: 10 ML | Refills: 3 | Status: SHIPPED | OUTPATIENT
Start: 2022-08-30 | End: 2022-09-07

## 2022-08-30 RX ORDER — DEXAMETHASONE SODIUM PHOSPHATE 4 MG/ML
4 INJECTION, SOLUTION INTRA-ARTICULAR; INTRALESIONAL; INTRAMUSCULAR; INTRAVENOUS; SOFT TISSUE ONCE AS NEEDED
Status: COMPLETED | OUTPATIENT
Start: 2022-08-30 | End: 2022-08-30

## 2022-08-30 RX ORDER — PROPOFOL 10 MG/ML
VIAL (ML) INTRAVENOUS AS NEEDED
Status: DISCONTINUED | OUTPATIENT
Start: 2022-08-30 | End: 2022-08-30 | Stop reason: SURG

## 2022-08-30 RX ORDER — PROMETHAZINE HYDROCHLORIDE 25 MG/1
25 SUPPOSITORY RECTAL ONCE AS NEEDED
Status: DISCONTINUED | OUTPATIENT
Start: 2022-08-30 | End: 2022-08-30 | Stop reason: HOSPADM

## 2022-08-30 RX ORDER — MIDAZOLAM HYDROCHLORIDE 1 MG/ML
2 INJECTION INTRAMUSCULAR; INTRAVENOUS ONCE
Status: COMPLETED | OUTPATIENT
Start: 2022-08-30 | End: 2022-08-30

## 2022-08-30 RX ORDER — KETOROLAC TROMETHAMINE 30 MG/ML
INJECTION, SOLUTION INTRAMUSCULAR; INTRAVENOUS AS NEEDED
Status: DISCONTINUED | OUTPATIENT
Start: 2022-08-30 | End: 2022-08-30 | Stop reason: SURG

## 2022-08-30 RX ORDER — SODIUM CHLORIDE, SODIUM LACTATE, POTASSIUM CHLORIDE, CALCIUM CHLORIDE 600; 310; 30; 20 MG/100ML; MG/100ML; MG/100ML; MG/100ML
9 INJECTION, SOLUTION INTRAVENOUS CONTINUOUS PRN
Status: DISCONTINUED | OUTPATIENT
Start: 2022-08-30 | End: 2022-08-30 | Stop reason: HOSPADM

## 2022-08-30 RX ORDER — ACETAMINOPHEN 500 MG
1000 TABLET ORAL ONCE
Status: DISCONTINUED | OUTPATIENT
Start: 2022-08-30 | End: 2022-08-30 | Stop reason: HOSPADM

## 2022-08-30 RX ORDER — PROMETHAZINE HYDROCHLORIDE 12.5 MG/1
25 TABLET ORAL ONCE AS NEEDED
Status: DISCONTINUED | OUTPATIENT
Start: 2022-08-30 | End: 2022-08-30 | Stop reason: HOSPADM

## 2022-08-30 RX ORDER — KETAMINE HCL IN NACL, ISO-OSM 100MG/10ML
SYRINGE (ML) INJECTION AS NEEDED
Status: DISCONTINUED | OUTPATIENT
Start: 2022-08-30 | End: 2022-08-30 | Stop reason: SURG

## 2022-08-30 RX ORDER — OFLOXACIN 3 MG/ML
SOLUTION/ DROPS OPHTHALMIC AS NEEDED
Status: DISCONTINUED | OUTPATIENT
Start: 2022-08-30 | End: 2022-08-30 | Stop reason: HOSPADM

## 2022-08-30 RX ORDER — SCOLOPAMINE TRANSDERMAL SYSTEM 1 MG/1
1 PATCH, EXTENDED RELEASE TRANSDERMAL ONCE
Status: DISCONTINUED | OUTPATIENT
Start: 2022-08-30 | End: 2022-08-30 | Stop reason: HOSPADM

## 2022-08-30 RX ORDER — GLYCOPYRROLATE 0.2 MG/ML
0.2 INJECTION INTRAMUSCULAR; INTRAVENOUS
Status: COMPLETED | OUTPATIENT
Start: 2022-08-30 | End: 2022-08-30

## 2022-08-30 RX ORDER — OXYCODONE HYDROCHLORIDE 5 MG/1
5 TABLET ORAL
Status: DISCONTINUED | OUTPATIENT
Start: 2022-08-30 | End: 2022-08-30 | Stop reason: HOSPADM

## 2022-08-30 RX ORDER — LIDOCAINE HYDROCHLORIDE 20 MG/ML
INJECTION, SOLUTION EPIDURAL; INFILTRATION; INTRACAUDAL; PERINEURAL AS NEEDED
Status: DISCONTINUED | OUTPATIENT
Start: 2022-08-30 | End: 2022-08-30 | Stop reason: SURG

## 2022-08-30 RX ADMIN — ONDANSETRON 4 MG: 2 INJECTION INTRAMUSCULAR; INTRAVENOUS at 07:43

## 2022-08-30 RX ADMIN — MIDAZOLAM HYDROCHLORIDE 2 MG: 2 INJECTION, SOLUTION INTRAMUSCULAR; INTRAVENOUS at 07:36

## 2022-08-30 RX ADMIN — KETOROLAC TROMETHAMINE 30 MG: 30 INJECTION, SOLUTION INTRAMUSCULAR; INTRAVENOUS at 07:43

## 2022-08-30 RX ADMIN — SODIUM CHLORIDE, POTASSIUM CHLORIDE, SODIUM LACTATE AND CALCIUM CHLORIDE 9 ML/HR: 600; 310; 30; 20 INJECTION, SOLUTION INTRAVENOUS at 07:08

## 2022-08-30 RX ADMIN — Medication 25 MG: at 07:50

## 2022-08-30 RX ADMIN — Medication 25 MG: at 07:45

## 2022-08-30 RX ADMIN — HYDROMORPHONE HYDROCHLORIDE 0.5 MG: 1 INJECTION, SOLUTION INTRAMUSCULAR; INTRAVENOUS; SUBCUTANEOUS at 08:53

## 2022-08-30 RX ADMIN — PROPOFOL 50 MG: 10 INJECTION, EMULSION INTRAVENOUS at 07:45

## 2022-08-30 RX ADMIN — DEXAMETHASONE SODIUM PHOSPHATE 4 MG: 4 INJECTION INTRA-ARTICULAR; INTRALESIONAL; INTRAMUSCULAR; INTRAVENOUS; SOFT TISSUE at 07:36

## 2022-08-30 RX ADMIN — LIDOCAINE HYDROCHLORIDE 100 MG: 20 INJECTION, SOLUTION EPIDURAL; INFILTRATION; INTRACAUDAL; PERINEURAL at 07:45

## 2022-08-30 RX ADMIN — PROPOFOL 100 MCG/KG/MIN: 10 INJECTION, EMULSION INTRAVENOUS at 07:45

## 2022-08-30 RX ADMIN — SCOPALAMINE 1 PATCH: 1 PATCH, EXTENDED RELEASE TRANSDERMAL at 07:35

## 2022-08-30 RX ADMIN — GLYCOPYRROLATE 0.2 MG: 0.2 INJECTION INTRAMUSCULAR; INTRAVENOUS at 07:36

## 2022-08-30 NOTE — ANESTHESIA PREPROCEDURE EVALUATION
Anesthesia Evaluation     Patient summary reviewed and Nursing notes reviewed   history of anesthetic complications: PONV  NPO Solid Status: > 8 hours  NPO Liquid Status: > 2 hours           Airway   Mallampati: II  TM distance: >3 FB  Neck ROM: full  No difficulty expected  Dental          Pulmonary - normal exam    breath sounds clear to auscultation  (+) asthma,shortness of breath, recent URI,   Cardiovascular - normal exam  Exercise tolerance: good (4-7 METS)    ECG reviewed  Rhythm: regular  Rate: normal    (+) hypertension, valvular problems/murmurs, dysrhythmias (SSS s/p ablation and pacemaker) Atrial Fib, hyperlipidemia,     ROS comment: Sinus rhythm  Prolonged MS interval  Minimal ST depression, anterolateral leads    Neuro/Psych- negative ROS  GI/Hepatic/Renal/Endo    (+)  GERD poorly controlled,  thyroid problem hypothyroidism    Musculoskeletal     (+) neck pain,   Abdominal    Substance History - negative use     OB/GYN negative ob/gyn ROS         Other   arthritis,    history of cancer (skin)                  Anesthesia Plan    ASA 3     general   total IV anesthesia  (Patient understands anesthesia not responsible for dental damage.)  intravenous induction     Anesthetic plan, risks, benefits, and alternatives have been provided, discussed and informed consent has been obtained with: patient.    Use of blood products discussed with patient .   Plan discussed with CRNA.        CODE STATUS:

## 2022-08-30 NOTE — ANESTHESIA POSTPROCEDURE EVALUATION
Patient: Suzanna Santos    Procedure Summary     Date: 08/30/22 Room / Location: [facility] OR 02 / [facility] MAIN OR    Anesthesia Start: 0742 Anesthesia Stop: 0818    Procedure: BILATERAL MYRINGOTOMY WITH INSERTION OF EAR TUBES, NASOPHARYNGOSCOPY (Bilateral Ear) Diagnosis:       Eustachian tube dysfunction, bilateral      Conductive hearing loss, bilateral      (Eustachian tube dysfunction, bilateral [H69.83])      (Conductive hearing loss, bilateral [H90.0])    Surgeons: Ag Pepe MD Provider: Logan Parker MD    Anesthesia Type: general ASA Status: 3          Anesthesia Type: general    Vitals  Vitals Value Taken Time   /81 08/30/22 0832   Temp     Pulse 80 08/30/22 0833   Resp     SpO2 100 % 08/30/22 0833   Vitals shown include unvalidated device data.        Post Anesthesia Care and Evaluation    Patient location during evaluation: bedside  Patient participation: complete - patient participated  Level of consciousness: awake  Pain management: adequate    Airway patency: patent  Anesthetic complications: No anesthetic complications  PONV Status: none  Cardiovascular status: acceptable and stable  Respiratory status: acceptable  Hydration status: acceptable    Comments: An Anesthesiologist personally participated in the most demanding procedures (including induction and emergence if applicable) in the anesthesia plan, monitored the course of anesthesia administration at frequent intervals and remained physically present and available for immediate diagnosis and treatment of emergencies.

## 2022-09-07 ENCOUNTER — OFFICE VISIT (OUTPATIENT)
Dept: CARDIOLOGY | Facility: CLINIC | Age: 72
End: 2022-09-07

## 2022-09-07 VITALS
HEART RATE: 82 BPM | WEIGHT: 172 LBS | DIASTOLIC BLOOD PRESSURE: 76 MMHG | SYSTOLIC BLOOD PRESSURE: 126 MMHG | HEIGHT: 64 IN | BODY MASS INDEX: 29.37 KG/M2

## 2022-09-07 DIAGNOSIS — I49.5 SSS (SICK SINUS SYNDROME): ICD-10-CM

## 2022-09-07 DIAGNOSIS — I48.0 PAF (PAROXYSMAL ATRIAL FIBRILLATION): Primary | ICD-10-CM

## 2022-09-07 DIAGNOSIS — Z95.0 PRESENCE OF CARDIAC PACEMAKER: ICD-10-CM

## 2022-09-07 PROCEDURE — 99214 OFFICE O/P EST MOD 30 MIN: CPT | Performed by: INTERNAL MEDICINE

## 2022-09-07 NOTE — PROGRESS NOTES
Chief Complaint  Rapid Heart Rate    Subjective    Patient has been doing well occasional still with tachycardic episodes but less frequent and not as long in duration.    Past Medical History:   Diagnosis Date   • Acid reflux    • Anemia    • Arthritis    • Asthma     inhaler prn   • Atrial fibrillation (HCC)    • Atrial tachycardia (HCC)    • Benign essential hypertension    • Cancer (HCC)     skin   • Cervicalgia    • Eustachian tube dysfunction    • GERD (gastroesophageal reflux disease)    • Glaucoma    • History of degenerative disc disease    • Hypertension    • Lumbago    • Muscle cramps    • Neuropathy    • Pacemaker     st julius    • PONV (postoperative nausea and vomiting)    • SVT (supraventricular tachycardia) (HCC)    • Thyroid disease     no med/cyst on thyroid         Current Outpatient Medications:   •  albuterol sulfate  (90 Base) MCG/ACT inhaler, 2 puffs Daily As Needed., Disp: , Rfl:   •  Ascorbic Acid (BETITO-C PO), Take  by mouth., Disp: , Rfl:   •  baclofen (LIORESAL) 10 MG tablet, Take 1 tablet by mouth 3 (Three) Times a Day. (Patient taking differently: Take 10 mg by mouth 3 (Three) Times a Day As Needed.), Disp: 90 tablet, Rfl: 5  •  BIOTIN PO, Take  by mouth Daily., Disp: , Rfl:   •  budesonide-formoterol (SYMBICORT) 160-4.5 MCG/ACT inhaler, INHALE 2 PUFFS BY MOUTH TWICE DAILY, Disp: 10.2 g, Rfl: 1  •  celecoxib (CeleBREX) 200 MG capsule, Take 1 capsule by mouth Daily. (Patient taking differently: Take 200 mg by mouth Daily. Last dose 8/25/22), Disp: 90 capsule, Rfl: 3  •  cyanocobalamin (VITAMIN B-12) 500 MCG tablet, Take  by mouth., Disp: , Rfl:   •  Eliquis 5 MG tablet tablet, TAKE 1 TABLET EVERY 12     HOURS (Patient taking differently: Take 5 mg by mouth Every 12 (Twelve) Hours. Last dose 8/27/22 p.m.), Disp: 180 tablet, Rfl: 3  •  famotidine (PEPCID) 20 MG tablet, Take 20 mg by mouth Every Night., Disp: , Rfl:   •  metoprolol tartrate (LOPRESSOR) 25 MG tablet, Take 1 tablet by  "mouth 2 (Two) Times a Day., Disp: 180 tablet, Rfl: 0  •  olmesartan-hydrochlorothiazide (BENICAR HCT) 40-12.5 MG per tablet, Take 1 tablet by mouth Daily. (Patient taking differently: Take 1 tablet by mouth Daily. Inst per anesthesia protocol), Disp: 90 tablet, Rfl: 3  •  Omeprazole Magnesium (PRILOSEC OTC PO), Take 20 mg by mouth Daily., Disp: , Rfl:   •  traMADol (ULTRAM) 50 MG tablet, Take 1 tablet by mouth Every 6 (Six) Hours As Needed for Moderate Pain ., Disp: 90 tablet, Rfl: 2  •  Zinc Sulfate (ZINC-220 PO), Take  by mouth., Disp: , Rfl:     Medications Discontinued During This Encounter   Medication Reason   • ofloxacin (FLOXIN) 0.3 % otic solution *Therapy completed     Allergies   Allergen Reactions   • Doxycycline Hives     .   • Lariam [Mefloquine] Hives     .   • Lisinopril Hives     .   • Talwin [Pentazocine] Hallucinations        Social History     Tobacco Use   • Smoking status: Never Smoker   • Smokeless tobacco: Never Used   Vaping Use   • Vaping Use: Never used   Substance Use Topics   • Alcohol use: No   • Drug use: Never       Family History   Problem Relation Age of Onset   • Heart attack Mother    • Heart disease Mother    • Diabetes Mother    • Arthritis Mother    • Heart attack Father    • Coronary artery disease Father    • Heart disease Father    • Diabetes Father    • Rectal cancer Brother 30   • Heart disease Brother    • Diabetes Brother    • Arthritis Brother    • Malig Hyperthermia Neg Hx         Objective     /76   Pulse 82   Ht 162.6 cm (64\")   Wt 78 kg (172 lb)   BMI 29.52 kg/m²       Physical Exam    General Appearance:   · no acute distress  · Alert and oriented x3  HENT:   · lips not cyanotic  · Atraumatic  Neck:  · No jvd   · supple  Respiratory:  · no respiratory distress  · normal breath sounds  · no rales  Cardiovascular:  · Regular rate and rhythm  · no S3, no S4   · no murmur  · no rub  Extremities  · No cyanosis  · lower extremity edema: none    Skin:   · warm, " dry  · No rashes      Result Review :     No results found for: PROBNP  CMP    CMP 12/28/21 3/11/22 8/23/22   Glucose 101 (A) 99 105 (A)   BUN 14 14 21   Creatinine 0.96 0.77 0.89   eGFR Non African Am 57 (A)     Sodium 141 138 139   Potassium 4.5 4.2 4.3   Chloride 106 101 103   Calcium 9.7 9.9 9.5   Albumin 4.20 4.30 4.60   Total Bilirubin 0.6 0.7 0.6   Alkaline Phosphatase 70 73 63   AST (SGOT) 26 19 16   ALT (SGPT) 21 16 12   (A) Abnormal value            CBC w/diff    CBC w/Diff 10/1/21 12/28/21 8/23/22   WBC 7.58 6.36 7.58   RBC 5.13 6.07 (A) 5.88 (A)   Hemoglobin 10.2 (A) 11.7 (A) 11.3 (A)   Hematocrit 33.3 (A) 38.4 37.3   MCV 64.9 (A) 63.3 (A) 63.4 (A)   MCH 19.9 (A) 19.3 (A) 19.2 (A)   MCHC 30.6 (A) 30.5 (A) 30.3 (A)   RDW 18.0 (A) 19.2 (A) 19.0 (A)   Platelets 217 189 206   Neutrophil Rel %  63.6 67.1   Lymphocyte Rel %  18.9 (A) 17.2 (A)   Monocyte Rel %  11.8 9.2   Eosinophil Rel %  3.6 4.9   Basophil Rel %  1.6 (A) 1.3   (A) Abnormal value             Lab Results   Component Value Date    TSH 2.590 08/23/2022      Lab Results   Component Value Date    FREET4 1.28 08/23/2022      No results found for: DDIMERQUANT  No results found for: MG   No results found for: DIGOXIN   No results found for: TROPONINT        Lipid Panel    Lipid Panel 8/23/22   Total Cholesterol 188   Triglycerides 115   HDL Cholesterol 48   VLDL Cholesterol 21   LDL Cholesterol  119 (A)   LDL/HDL Ratio 2.44   (A) Abnormal value            No results found for: POCTROP                   Diagnoses and all orders for this visit:    1. PAF (paroxysmal atrial fibrillation) (HCC) (Primary)  Assessment & Plan:  Patient is maintaining normal sinus rhythm with some brief breakthrough tachycardic episodes continue metoprolol 25 twice daily.  Patient is also on Eliquis 5 twice daily for CVA prevention encouraged exercising      2. SSS (sick sinus syndrome) ([facility])  Assessment & Plan:  Patient's pacemaker working properly repeat interrogation  office in 6 months      3. Presence of cardiac pacemaker          Follow Up     Return in about 6 months (around 3/7/2023) for Pacemaker w/f/u.          Patient was given instructions and counseling regarding her condition or for health maintenance advice. Please see specific information pulled into the AVS if appropriate.

## 2022-09-07 NOTE — ASSESSMENT & PLAN NOTE
Patient is maintaining normal sinus rhythm with some brief breakthrough tachycardic episodes continue metoprolol 25 twice daily.  Patient is also on Eliquis 5 twice daily for CVA prevention encouraged exercising

## 2022-09-15 ENCOUNTER — OFFICE VISIT (OUTPATIENT)
Dept: INTERNAL MEDICINE | Facility: CLINIC | Age: 72
End: 2022-09-15

## 2022-09-15 VITALS
BODY MASS INDEX: 29.67 KG/M2 | HEART RATE: 67 BPM | DIASTOLIC BLOOD PRESSURE: 76 MMHG | OXYGEN SATURATION: 97 % | SYSTOLIC BLOOD PRESSURE: 128 MMHG | TEMPERATURE: 97.2 F | WEIGHT: 173.8 LBS | HEIGHT: 64 IN

## 2022-09-15 DIAGNOSIS — Z98.890 H/O CARDIAC RADIOFREQUENCY ABLATION: ICD-10-CM

## 2022-09-15 DIAGNOSIS — D50.9 IRON DEFICIENCY ANEMIA, UNSPECIFIED IRON DEFICIENCY ANEMIA TYPE: ICD-10-CM

## 2022-09-15 DIAGNOSIS — E04.1 NONTOXIC SINGLE THYROID NODULE: ICD-10-CM

## 2022-09-15 DIAGNOSIS — F41.1 ANXIETY, GENERALIZED: ICD-10-CM

## 2022-09-15 DIAGNOSIS — E78.2 MIXED HYPERLIPIDEMIA: ICD-10-CM

## 2022-09-15 DIAGNOSIS — F33.1 MAJOR DEPRESSIVE DISORDER, RECURRENT, MODERATE: ICD-10-CM

## 2022-09-15 DIAGNOSIS — I10 ESSENTIAL HYPERTENSION: ICD-10-CM

## 2022-09-15 DIAGNOSIS — I48.0 PAF (PAROXYSMAL ATRIAL FIBRILLATION): ICD-10-CM

## 2022-09-15 DIAGNOSIS — E55.9 VITAMIN D DEFICIENCY: ICD-10-CM

## 2022-09-15 DIAGNOSIS — I49.5 SSS (SICK SINUS SYNDROME): ICD-10-CM

## 2022-09-15 DIAGNOSIS — G62.9 NEUROPATHY: Primary | ICD-10-CM

## 2022-09-15 DIAGNOSIS — J45.41 MODERATE PERSISTENT ASTHMA WITH ACUTE EXACERBATION: ICD-10-CM

## 2022-09-15 DIAGNOSIS — K21.9 GASTRO-ESOPHAGEAL REFLUX DISEASE WITHOUT ESOPHAGITIS: ICD-10-CM

## 2022-09-15 PROCEDURE — 99214 OFFICE O/P EST MOD 30 MIN: CPT | Performed by: INTERNAL MEDICINE

## 2022-09-15 RX ORDER — ATOVAQUONE AND PROGUANIL HYDROCHLORIDE 250; 100 MG/1; MG/1
1 TABLET, FILM COATED ORAL
Qty: 23 TABLET | Refills: 0 | Status: SHIPPED | OUTPATIENT
Start: 2022-09-15 | End: 2022-12-12

## 2022-09-15 RX ORDER — OLMESARTAN MEDOXOMIL AND HYDROCHLOROTHIAZIDE 40/12.5 40; 12.5 MG/1; MG/1
1 TABLET ORAL DAILY
Qty: 90 TABLET | Refills: 3 | Status: SHIPPED | OUTPATIENT
Start: 2022-09-15

## 2022-09-15 RX ORDER — PAROXETINE HYDROCHLORIDE 20 MG/1
20 TABLET, FILM COATED ORAL EVERY MORNING
Qty: 90 TABLET | Refills: 3 | Status: SHIPPED | OUTPATIENT
Start: 2022-09-15

## 2022-09-15 RX ORDER — LORAZEPAM 0.5 MG/1
0.5 TABLET ORAL 2 TIMES DAILY PRN
Qty: 60 TABLET | Refills: 5 | Status: SHIPPED | OUTPATIENT
Start: 2022-09-15

## 2022-09-15 RX ORDER — CIPROFLOXACIN 500 MG/1
500 TABLET, FILM COATED ORAL 2 TIMES DAILY
Qty: 20 TABLET | Refills: 0 | Status: SHIPPED | OUTPATIENT
Start: 2022-09-15 | End: 2022-12-12

## 2022-09-15 NOTE — PROGRESS NOTES
"Chief Complaint/ HPI: Patient is here to follow-up with her routine checkup says she has been more nervous uneasy nauseated at times stomachs upset she thinks it may be a little bit of anxiety or depression starting to come back she is got a trip planned coming up, we discussed some of the prophylactic medication she will be taking on the trip, she is here to review labs,    Objective   Vital Signs  Vitals:    09/15/22 1258   BP: 128/76   Pulse: 67   Temp: 97.2 °F (36.2 °C)   SpO2: 97%   Weight: 78.8 kg (173 lb 12.8 oz)   Height: 162.6 cm (64.02\")      Body mass index is 29.82 kg/m².  Review of Systems   Constitutional: Positive for fatigue and unexpected weight loss.   HENT: Negative.    Eyes: Negative.    Respiratory: Negative.    Cardiovascular: Negative.    Gastrointestinal: Negative.    Endocrine: Negative.    Genitourinary: Negative.    Musculoskeletal: Negative.    Allergic/Immunologic: Negative.    Neurological: Negative.    Hematological: Negative.    Psychiatric/Behavioral: Positive for agitation and depressed mood.      Physical Exam  Constitutional:       General: She is not in acute distress.     Appearance: Normal appearance.   HENT:      Head: Normocephalic.      Mouth/Throat:      Mouth: Mucous membranes are moist.   Eyes:      Conjunctiva/sclera: Conjunctivae normal.      Pupils: Pupils are equal, round, and reactive to light.   Cardiovascular:      Rate and Rhythm: Normal rate and regular rhythm.      Pulses: Normal pulses.      Heart sounds: Normal heart sounds.   Pulmonary:      Effort: Pulmonary effort is normal.      Breath sounds: Normal breath sounds.   Abdominal:      General: Abdomen is flat. Bowel sounds are normal.      Palpations: Abdomen is soft.   Musculoskeletal:         General: No swelling. Normal range of motion.      Cervical back: Neck supple.   Skin:     General: Skin is warm and dry.      Coloration: Skin is not jaundiced.   Neurological:      General: No focal deficit present. "      Mental Status: She is alert and oriented to person, place, and time. Mental status is at baseline.   Psychiatric:         Mood and Affect: Mood normal.         Behavior: Behavior normal.         Thought Content: Thought content normal.         Judgment: Judgment normal.        Result Review :   Lab Results   Component Value Date     04/30/2019     CMP    CMP 12/28/21 3/11/22 8/23/22   Glucose 101 (A) 99 105 (A)   BUN 14 14 21   Creatinine 0.96 0.77 0.89   eGFR Non African Am 57 (A)     Sodium 141 138 139   Potassium 4.5 4.2 4.3   Chloride 106 101 103   Calcium 9.7 9.9 9.5   Albumin 4.20 4.30 4.60   Total Bilirubin 0.6 0.7 0.6   Alkaline Phosphatase 70 73 63   AST (SGOT) 26 19 16   ALT (SGPT) 21 16 12   (A) Abnormal value            CBC w/diff    CBC w/Diff 10/1/21 12/28/21 8/23/22   WBC 7.58 6.36 7.58   RBC 5.13 6.07 (A) 5.88 (A)   Hemoglobin 10.2 (A) 11.7 (A) 11.3 (A)   Hematocrit 33.3 (A) 38.4 37.3   MCV 64.9 (A) 63.3 (A) 63.4 (A)   MCH 19.9 (A) 19.3 (A) 19.2 (A)   MCHC 30.6 (A) 30.5 (A) 30.3 (A)   RDW 18.0 (A) 19.2 (A) 19.0 (A)   Platelets 217 189 206   Neutrophil Rel %  63.6 67.1   Lymphocyte Rel %  18.9 (A) 17.2 (A)   Monocyte Rel %  11.8 9.2   Eosinophil Rel %  3.6 4.9   Basophil Rel %  1.6 (A) 1.3   (A) Abnormal value             Lipid Panel    Lipid Panel 8/23/22   Total Cholesterol 188   Triglycerides 115   HDL Cholesterol 48   VLDL Cholesterol 21   LDL Cholesterol  119 (A)   LDL/HDL Ratio 2.44   (A) Abnormal value             Lab Results   Component Value Date    TSH 2.590 08/23/2022    TSH 2.530 03/11/2022    TSH 2.290 07/01/2021      Lab Results   Component Value Date    FREET4 1.28 08/23/2022    FREET4 1.22 03/11/2022    FREET4 1.2 03/22/2021                          Visit Diagnoses:    ICD-10-CM ICD-9-CM   1. Neuropathy  G62.9 355.9   2. Mixed hyperlipidemia  E78.2 272.2   3. Iron deficiency anemia, unspecified iron deficiency anemia type  D50.9 280.9   4. Essential hypertension  I10 401.9    5. PAF (paroxysmal atrial fibrillation) ([facility])  I48.0 427.31   6. Vitamin D deficiency  E55.9 268.9   7. Gastro-esophageal reflux disease without esophagitis  K21.9 530.81   8. Nontoxic single thyroid nodule  E04.1 241.0   9. SSS (sick sinus syndrome) ([facility])  I49.5 427.81   10. H/O cardiac radiofrequency ablation  Z98.890 V15.1   11. Moderate persistent asthma with acute exacerbation  J45.41 493.92   12. Anxiety, generalized  F41.1 300.02   13. Major depressive disorder, recurrent, moderate ([facility])  F33.1 296.32       Assessment and Plan   Diagnoses and all orders for this visit:    1. Neuropathy (Primary)  -     LORazepam (Ativan) 0.5 MG tablet; Take 1 tablet by mouth 2 (Two) Times a Day As Needed for Anxiety.  Dispense: 60 tablet; Refill: 5    2. Mixed hyperlipidemia  -     LORazepam (Ativan) 0.5 MG tablet; Take 1 tablet by mouth 2 (Two) Times a Day As Needed for Anxiety.  Dispense: 60 tablet; Refill: 5    3. Iron deficiency anemia, unspecified iron deficiency anemia type  -     LORazepam (Ativan) 0.5 MG tablet; Take 1 tablet by mouth 2 (Two) Times a Day As Needed for Anxiety.  Dispense: 60 tablet; Refill: 5    4. Essential hypertension  -     LORazepam (Ativan) 0.5 MG tablet; Take 1 tablet by mouth 2 (Two) Times a Day As Needed for Anxiety.  Dispense: 60 tablet; Refill: 5    5. PAF (paroxysmal atrial fibrillation) ([facility])  -     LORazepam (Ativan) 0.5 MG tablet; Take 1 tablet by mouth 2 (Two) Times a Day As Needed for Anxiety.  Dispense: 60 tablet; Refill: 5    6. Vitamin D deficiency  -     LORazepam (Ativan) 0.5 MG tablet; Take 1 tablet by mouth 2 (Two) Times a Day As Needed for Anxiety.  Dispense: 60 tablet; Refill: 5    7. Gastro-esophageal reflux disease without esophagitis  -     LORazepam (Ativan) 0.5 MG tablet; Take 1 tablet by mouth 2 (Two) Times a Day As Needed for Anxiety.  Dispense: 60 tablet; Refill: 5    8. Nontoxic single thyroid nodule  -     LORazepam (Ativan) 0.5 MG tablet; Take 1 tablet by mouth 2  (Two) Times a Day As Needed for Anxiety.  Dispense: 60 tablet; Refill: 5    9. SSS (sick sinus syndrome) (HCC)  -     LORazepam (Ativan) 0.5 MG tablet; Take 1 tablet by mouth 2 (Two) Times a Day As Needed for Anxiety.  Dispense: 60 tablet; Refill: 5    10. H/O cardiac radiofrequency ablation  -     LORazepam (Ativan) 0.5 MG tablet; Take 1 tablet by mouth 2 (Two) Times a Day As Needed for Anxiety.  Dispense: 60 tablet; Refill: 5    11. Moderate persistent asthma with acute exacerbation  -     LORazepam (Ativan) 0.5 MG tablet; Take 1 tablet by mouth 2 (Two) Times a Day As Needed for Anxiety.  Dispense: 60 tablet; Refill: 5    12. Anxiety, generalized  -     LORazepam (Ativan) 0.5 MG tablet; Take 1 tablet by mouth 2 (Two) Times a Day As Needed for Anxiety.  Dispense: 60 tablet; Refill: 5    13. Major depressive disorder, recurrent, moderate (HCC)  -     LORazepam (Ativan) 0.5 MG tablet; Take 1 tablet by mouth 2 (Two) Times a Day As Needed for Anxiety.  Dispense: 60 tablet; Refill: 5    Other orders  -     PARoxetine (Paxil) 20 MG tablet; Take 1 tablet by mouth Every Morning.  Dispense: 90 tablet; Refill: 3  -     ciprofloxacin (Cipro) 500 MG tablet; Take 1 tablet by mouth 2 (Two) Times a Day.  Dispense: 20 tablet; Refill: 0  -     atovaquone-proguanil (Malarone) 250-100 MG tablet per tablet; Take 1 tablet by mouth Daily.  Dispense: 23 tablet; Refill: 0  -     olmesartan-hydrochlorothiazide (BENICAR HCT) 40-12.5 MG per tablet; Take 1 tablet by mouth Daily.  Dispense: 90 tablet; Refill: 3         ANXIETY, DEPRESSION-- OFF PAXIL , treatment options discussed September 15, 2022--restart the Paxil and some Ativan    Tinnitus, roaring in head had recent tympanoplasty's performed by Dr. Umaña in August 2022 recent lab work noted August 2022 and chest x-ray no active disease August 23, 2022--- should improve also with some Ativan, she has a follow-up with ENT next week    Covid infection January 2022,     acute T7 compression  fracture after fall hitting her sternum with chest contusion, presyncope with head coontusion also and back pain, May 5 and sixth 2021, went to the emergency room, diagnosed with UTI,---    ARTHRITIS, IN HANDS , THUMBS , R FOOT, LOW BACK PAIN, NECK OCC.--Now with cervical radiculopathy C4-5 area, -- rheumatologist -----was diagnosed clinically with rheumatoid arthritis, CCP antibody and rheumatoid factors are negative on blood test per rheumatology September 2020,-----cont --tylenol arthritis , baclofen , arava  20mg qd ,   ---- rheumatology, okay to restart Celebrex, March 2022 patient will start Arava when she is finished her booster shot for Covid March 2022----dr palomino , tried tramadol 3 times a day recently, but not doing it currently as of September 2022, currently taking it once a day or as needed    A. Fib with rapid rate ---- electrophysiologist again, ablation due to atrial tachycardia July 29, 2019 Previous--aflutter, abalation march 8, 2019, and pacemaker done in [place] - SECOND ABALATION JULY 29, 2019, ---WORKED WELL ,-NOW IN PACED OR SINUS RHYTHM SEPTEMBER 2021      continues--ON METOPROLOL,25 BID , ELIQUIS 5 MG BID , LASIX PRN ,    PAF, ABALATION AT [facility], JULY 2017,     HTN, --- continues, metoprolol 25 TWICE A DAY,  olmesartan 40/12.5 HCT,    ANEMIA, THALLESEMIA- STABLE- hemoglobin 11.4, September 2021 improved stable     Glaucoma, laser treatment of the left eye, August 2021, and macular tear in the right eye, has been seeing surgery and ophthalmology as of September 2021    R TKA, 5/13    HEART MURMUR, -aortic regurgitation moderate by echo February 2016, will follow with cardiology-- dr brown     GERD- , EGD JUNE 2018, ERIK MARIE-- cont Prilosec a.m., Pepcid nightly,     Previous cholecystectomy, previous left total knee replacement 2010, previous right total hip replacement 2008, JACINTO and BSO,  R KNEE REVISION 6/2414 , WITH DR JOY    thryoid cyst, nodule -previous thyroid  ultrasounds, March 2014 and April 2016 October 2020 shows large mass in the left thyroid completely cystic, measures 7.2 cm previously measuring 6.1 cm, a 1 cm hypoechoic right thyroid nodule measuring up to 0.6 cm previously, and another smaller nodule at 0.6 cm in the right lower lobe of the thyroid,----thyroid ultrasound October 7, 2021 shows bilateral thyroid nodules large left cystic thyroid nodule has increased in size benign characteristics follow-up again in 1 year the cyst measures 8.6 x 3 cm slightly increased in size consider ENT follow-up drainage, second opinion, versus watchful waiting,    Follow Up   Return in about 4 months (around 1/15/2023).  Patient was given instructions and counseling regarding her condition or for health maintenance advice. Please see specific information pulled into the AVS if appropriate.

## 2022-09-23 ENCOUNTER — TRANSCRIBE ORDERS (OUTPATIENT)
Dept: ADMINISTRATIVE | Facility: HOSPITAL | Age: 72
End: 2022-09-23

## 2022-09-23 DIAGNOSIS — H90.12 CONDUCTIVE HEARING LOSS OF LEFT EAR, UNSPECIFIED HEARING STATUS ON CONTRALATERAL SIDE: Primary | ICD-10-CM

## 2022-10-17 ENCOUNTER — HOSPITAL ENCOUNTER (OUTPATIENT)
Dept: CT IMAGING | Facility: HOSPITAL | Age: 72
Discharge: HOME | End: 2022-10-17
Admitting: PHYSICIAN ASSISTANT

## 2022-10-17 DIAGNOSIS — H90.12 CONDUCTIVE HEARING LOSS OF LEFT EAR, UNSPECIFIED HEARING STATUS ON CONTRALATERAL SIDE: ICD-10-CM

## 2022-10-17 PROCEDURE — 70480 CT ORBIT/EAR/FOSSA W/O DYE: CPT

## 2022-10-21 ENCOUNTER — CLINICAL SUPPORT (OUTPATIENT)
Dept: GASTROENTEROLOGY | Facility: CLINIC | Age: 72
End: 2022-10-21

## 2022-10-21 ENCOUNTER — PREP FOR SURGERY (OUTPATIENT)
Dept: OTHER | Facility: HOSPITAL | Age: 72
End: 2022-10-21

## 2022-10-21 ENCOUNTER — TELEPHONE (OUTPATIENT)
Dept: GASTROENTEROLOGY | Facility: CLINIC | Age: 72
End: 2022-10-21

## 2022-10-21 DIAGNOSIS — Z86.010 HISTORY OF COLON POLYPS: Primary | ICD-10-CM

## 2022-10-21 NOTE — TELEPHONE ENCOUNTER
Suzanna Santos  REASON FOR CALL COLON SCREENING/RECALL  SENT IN PREP 4L PREP  PROCEDURE DATE: 12/13/2022  CARDIAC CLEARANCE: -MARIETTA AMEZQUITA - MARIETTA RUANO  NO PULM PER PT  Past Medical History:   Diagnosis Date   • Acid reflux    • Anemia    • Arthritis    • Asthma     inhaler prn   • Atrial fibrillation (HCC)    • Atrial tachycardia (HCC)    • Benign essential hypertension    • Cancer (HCC)     skin   • Cervicalgia    • Eustachian tube dysfunction    • GERD (gastroesophageal reflux disease)    • Glaucoma    • History of degenerative disc disease    • Hypertension    • Lumbago    • Muscle cramps    • Neuropathy    • Pacemaker     st julius    • PONV (postoperative nausea and vomiting)    • SVT (supraventricular tachycardia) (HCC)    • Thyroid disease     no med/cyst on thyroid     Allergies   Allergen Reactions   • Doxycycline Hives     .   • Lariam [Mefloquine] Hives     .   • Lisinopril Hives     .   • Talwin [Pentazocine] Hallucinations     Past Surgical History:   Procedure Laterality Date   • CARDIAC ABLATION      x4 - last 2019   • CARDIAC ELECTROPHYSIOLOGY MAPPING AND ABLATION     • CATARACT EXTRACTION, BILATERAL     • CHOLECYSTECTOMY     • COLONOSCOPY  02/08/2017    DR MARIE HISTORY OF COLON POLYPS   • COLONOSCOPY  09/30/2021    dr marie   • COLONOSCOPY N/A 09/30/2021    Procedure: COLONOSCOPY with BIOPSY/POLYPECTOMY COLD SNARE;  Surgeon: Tim Marie MD;  Location: [facility] ENDOSCOPY;  Service: Gastroenterology;  Laterality: N/A;  COLON POLYPS   • EAR TUBES Bilateral 08/30/2022    Procedure: BILATERAL MYRINGOTOMY WITH INSERTION OF EAR TUBES, NASOPHARYNGOSCOPY;  Surgeon: Ag Pepe MD;  Location: [facility] MAIN OR;  Service: ENT;  Laterality: Bilateral;   • ENDOSCOPY  06/18/2018    DR MARIE   • EYE SURGERY      Macular tear repair 3/1/2021 x2   • HYSTERECTOMY     • JOINT REPLACEMENT Right     total hip replacement    • JOINT REPLACEMENT Bilateral     TKR   • PACEMAKER REPLACEMENT     •  TONSILLECTOMY     • VITRECTOMY PARS PLANA W/ REPAIR OF MACULAR HOLE       Social History     Socioeconomic History   • Marital status:    Tobacco Use   • Smoking status: Never   • Smokeless tobacco: Never   Vaping Use   • Vaping Use: Never used   Substance and Sexual Activity   • Alcohol use: No   • Drug use: Defer   • Sexual activity: Defer     Family History   Problem Relation Age of Onset   • Heart attack Mother    • Heart disease Mother    • Diabetes Mother    • Arthritis Mother    • Heart attack Father    • Coronary artery disease Father    • Heart disease Father    • Diabetes Father    • Rectal cancer Brother 30   • Heart disease Brother    • Diabetes Brother    • Arthritis Brother    • Malig Hyperthermia Neg Hx        Current Outpatient Medications:   •  albuterol sulfate  (90 Base) MCG/ACT inhaler, 2 puffs Daily As Needed., Disp: , Rfl:   •  apixaban (ELIQUIS) 5 MG tablet tablet, Take 1 tablet by mouth 2 (Two) Times a Day., Disp: 180 tablet, Rfl: 3  •  Ascorbic Acid (BETITO-C PO), Take  by mouth., Disp: , Rfl:   •  atovaquone-proguanil (Malarone) 250-100 MG tablet per tablet, Take 1 tablet by mouth Daily., Disp: 23 tablet, Rfl: 0  •  baclofen (LIORESAL) 10 MG tablet, Take 1 tablet by mouth 3 (Three) Times a Day. (Patient taking differently: Take 1 tablet by mouth 3 (Three) Times a Day As Needed.), Disp: 90 tablet, Rfl: 5  •  BIOTIN PO, Take  by mouth Daily., Disp: , Rfl:   •  budesonide-formoterol (SYMBICORT) 160-4.5 MCG/ACT inhaler, INHALE 2 PUFFS BY MOUTH TWICE DAILY, Disp: 10.2 g, Rfl: 1  •  celecoxib (CeleBREX) 200 MG capsule, Take 1 capsule by mouth Daily. (Patient taking differently: Take 1 capsule by mouth Daily. Last dose 8/25/22), Disp: 90 capsule, Rfl: 3  •  ciprofloxacin (Cipro) 500 MG tablet, Take 1 tablet by mouth 2 (Two) Times a Day., Disp: 20 tablet, Rfl: 0  •  cyanocobalamin (VITAMIN B-12) 500 MCG tablet, Take  by mouth., Disp: , Rfl:   •  Eliquis 5 MG tablet tablet, TAKE 1 TABLET  EVERY 12     HOURS (Patient taking differently: Take 1 tablet by mouth Every 12 (Twelve) Hours. Last dose 8/27/22 p.m.), Disp: 180 tablet, Rfl: 3  •  famotidine (PEPCID) 20 MG tablet, Take 20 mg by mouth Every Night., Disp: , Rfl:   •  LORazepam (Ativan) 0.5 MG tablet, Take 1 tablet by mouth 2 (Two) Times a Day As Needed for Anxiety., Disp: 60 tablet, Rfl: 5  •  metoprolol tartrate (LOPRESSOR) 25 MG tablet, Take 1 tablet by mouth 2 (Two) Times a Day., Disp: 180 tablet, Rfl: 0  •  olmesartan-hydrochlorothiazide (BENICAR HCT) 40-12.5 MG per tablet, Take 1 tablet by mouth Daily., Disp: 90 tablet, Rfl: 3  •  Omeprazole Magnesium (PRILOSEC OTC PO), Take 20 mg by mouth Daily., Disp: , Rfl:   •  PARoxetine (Paxil) 20 MG tablet, Take 1 tablet by mouth Every Morning., Disp: 90 tablet, Rfl: 3  •  traMADol (ULTRAM) 50 MG tablet, Take 1 tablet by mouth Every 6 (Six) Hours As Needed for Moderate Pain ., Disp: 90 tablet, Rfl: 2  •  vitamin D3 125 MCG (5000 UT) capsule capsule, Take 1 capsule by mouth Daily., Disp: , Rfl:   •  Zinc Sulfate (ZINC-220 PO), Take  by mouth., Disp: , Rfl:

## 2022-10-24 PROBLEM — Z86.010 HISTORY OF COLON POLYPS: Status: ACTIVE | Noted: 2022-10-24

## 2022-10-24 PROBLEM — Z86.0100 HISTORY OF COLON POLYPS: Status: ACTIVE | Noted: 2022-10-24

## 2022-10-26 ENCOUNTER — TELEPHONE (OUTPATIENT)
Dept: GASTROENTEROLOGY | Facility: CLINIC | Age: 72
End: 2022-10-26

## 2022-10-26 ENCOUNTER — TELEPHONE (OUTPATIENT)
Dept: CARDIOLOGY | Facility: CLINIC | Age: 72
End: 2022-10-26

## 2022-10-26 NOTE — TELEPHONE ENCOUNTER
Procedure: Colonoscopy and/or EGD    Medication Directive: Eliquis     PMH: AFIB, SSS, Pacemaker    Last Seen: 09/7/22

## 2022-10-26 NOTE — TELEPHONE ENCOUNTER
We rec'd Blood Thinner CLEARANCE. I s/w patient. She is aware to stop blood thinner 2 days prior to procedure.

## 2022-11-14 DIAGNOSIS — J06.9 ACUTE URI: ICD-10-CM

## 2022-11-14 DIAGNOSIS — U07.1 COVID-19 VIRUS DETECTED: ICD-10-CM

## 2022-11-14 DIAGNOSIS — J01.00 ACUTE MAXILLARY SINUSITIS, RECURRENCE NOT SPECIFIED: ICD-10-CM

## 2022-11-14 RX ORDER — TEMAZEPAM 30 MG/1
30 CAPSULE ORAL NIGHTLY PRN
Qty: 30 CAPSULE | Refills: 3 | OUTPATIENT
Start: 2022-11-14

## 2022-11-18 DIAGNOSIS — J06.9 ACUTE URI: ICD-10-CM

## 2022-11-18 DIAGNOSIS — J01.00 ACUTE MAXILLARY SINUSITIS, RECURRENCE NOT SPECIFIED: ICD-10-CM

## 2022-11-18 DIAGNOSIS — U07.1 COVID-19 VIRUS DETECTED: ICD-10-CM

## 2022-11-18 RX ORDER — TEMAZEPAM 30 MG/1
30 CAPSULE ORAL NIGHTLY PRN
Qty: 30 CAPSULE | Refills: 3 | OUTPATIENT
Start: 2022-11-18

## 2022-12-05 ENCOUNTER — TELEPHONE (OUTPATIENT)
Dept: GASTROENTEROLOGY | Facility: CLINIC | Age: 72
End: 2022-12-05

## 2022-12-05 ENCOUNTER — PREP FOR SURGERY (OUTPATIENT)
Dept: OTHER | Facility: HOSPITAL | Age: 72
End: 2022-12-05

## 2022-12-05 DIAGNOSIS — R13.19 ESOPHAGEAL DYSPHAGIA: Primary | ICD-10-CM

## 2022-12-05 PROBLEM — R13.10 DYSPHAGIA: Status: ACTIVE | Noted: 2022-10-24

## 2022-12-05 NOTE — TELEPHONE ENCOUNTER
Colonoscopy Scheduled: 12.13.22  Last EGD/Colonoscopy: 3.3.21    Pt would like EGD added to her Colonoscopy for dysphagia. She states she is having trouble swallowing solids and liquids. She currently takes Prilosec 20mg QHS. Please advise.

## 2022-12-12 NOTE — PRE-PROCEDURE INSTRUCTIONS
PT INSTRUCTED ON CLEAR LIQ DIET AND PO SPLIT PREP LAST BM SHOULD BE CLEAR  PT CAN TAKE metoprolol., ativan   WITH A SIP OF WATER IN THE AM OF THE PROCEDURE ARRIVAL TIME IS 1100 am  ON 12/13/22 last eliquis sat. 12/10/22

## 2022-12-13 ENCOUNTER — ANESTHESIA EVENT (OUTPATIENT)
Dept: GASTROENTEROLOGY | Facility: HOSPITAL | Age: 72
End: 2022-12-13

## 2022-12-13 ENCOUNTER — ANESTHESIA (OUTPATIENT)
Dept: GASTROENTEROLOGY | Facility: HOSPITAL | Age: 72
End: 2022-12-13

## 2022-12-13 ENCOUNTER — HOSPITAL ENCOUNTER (OUTPATIENT)
Facility: HOSPITAL | Age: 72
Setting detail: HOSPITAL OUTPATIENT SURGERY
Discharge: HOME | End: 2022-12-13
Attending: INTERNAL MEDICINE | Admitting: INTERNAL MEDICINE

## 2022-12-13 VITALS
RESPIRATION RATE: 20 BRPM | DIASTOLIC BLOOD PRESSURE: 79 MMHG | HEIGHT: 64 IN | SYSTOLIC BLOOD PRESSURE: 149 MMHG | BODY MASS INDEX: 29.02 KG/M2 | TEMPERATURE: 97.4 F | HEART RATE: 63 BPM | WEIGHT: 170 LBS | OXYGEN SATURATION: 96 %

## 2022-12-13 DIAGNOSIS — R13.10 DYSPHAGIA: ICD-10-CM

## 2022-12-13 DIAGNOSIS — K21.9 GASTRO-ESOPHAGEAL REFLUX DISEASE WITHOUT ESOPHAGITIS: Primary | ICD-10-CM

## 2022-12-13 DIAGNOSIS — Z86.010 HISTORY OF COLON POLYPS: ICD-10-CM

## 2022-12-13 PROCEDURE — 88305 TISSUE EXAM BY PATHOLOGIST: CPT | Performed by: INTERNAL MEDICINE

## 2022-12-13 PROCEDURE — 43239 EGD BIOPSY SINGLE/MULTIPLE: CPT | Performed by: INTERNAL MEDICINE

## 2022-12-13 PROCEDURE — 25010000002 GLUCAGON (HUMAN RECOMBINANT) 1 MG RECONSTITUTED SOLUTION: Performed by: NURSE ANESTHETIST, CERTIFIED REGISTERED

## 2022-12-13 PROCEDURE — G0105 COLORECTAL SCRN; HI RISK IND: HCPCS | Performed by: INTERNAL MEDICINE

## 2022-12-13 PROCEDURE — 25010000002 PROPOFOL 10 MG/ML EMULSION: Performed by: NURSE ANESTHETIST, CERTIFIED REGISTERED

## 2022-12-13 RX ORDER — LIDOCAINE HYDROCHLORIDE 20 MG/ML
INJECTION, SOLUTION EPIDURAL; INFILTRATION; INTRACAUDAL; PERINEURAL AS NEEDED
Status: DISCONTINUED | OUTPATIENT
Start: 2022-12-13 | End: 2022-12-13 | Stop reason: SURG

## 2022-12-13 RX ORDER — SODIUM CHLORIDE, SODIUM LACTATE, POTASSIUM CHLORIDE, CALCIUM CHLORIDE 600; 310; 30; 20 MG/100ML; MG/100ML; MG/100ML; MG/100ML
30 INJECTION, SOLUTION INTRAVENOUS CONTINUOUS
Status: DISCONTINUED | OUTPATIENT
Start: 2022-12-13 | End: 2022-12-13 | Stop reason: HOSPADM

## 2022-12-13 RX ORDER — PROPOFOL 10 MG/ML
VIAL (ML) INTRAVENOUS AS NEEDED
Status: DISCONTINUED | OUTPATIENT
Start: 2022-12-13 | End: 2022-12-13 | Stop reason: SURG

## 2022-12-13 RX ADMIN — LIDOCAINE HYDROCHLORIDE 100 MG: 20 INJECTION, SOLUTION EPIDURAL; INFILTRATION; INTRACAUDAL; PERINEURAL at 13:21

## 2022-12-13 RX ADMIN — PROPOFOL 200 MCG/KG/MIN: 10 INJECTION, EMULSION INTRAVENOUS at 13:21

## 2022-12-13 RX ADMIN — PROPOFOL 100 MG: 10 INJECTION, EMULSION INTRAVENOUS at 13:35

## 2022-12-13 RX ADMIN — SODIUM CHLORIDE, POTASSIUM CHLORIDE, SODIUM LACTATE AND CALCIUM CHLORIDE 30 ML/HR: 600; 310; 30; 20 INJECTION, SOLUTION INTRAVENOUS at 12:07

## 2022-12-13 RX ADMIN — PROPOFOL 50 MG: 10 INJECTION, EMULSION INTRAVENOUS at 13:43

## 2022-12-13 RX ADMIN — PROPOFOL 100 MG: 10 INJECTION, EMULSION INTRAVENOUS at 13:21

## 2022-12-13 RX ADMIN — GLUCAGON HYDROCHLORIDE 0.5 MG: KIT at 13:26

## 2022-12-13 NOTE — H&P
Pre Procedure History & Physical    Chief Complaint:   History colon polyps and dysphagia    Subjective     HPI:   History of colon polyps and dysphagia    Past Medical History:   Past Medical History:   Diagnosis Date   • Acid reflux    • Anemia    • Arthritis    • Asthma     inhaler prn   • Atrial fibrillation (HCC)    • Atrial tachycardia (HCC)    • Benign essential hypertension    • Cancer (HCC)     skin   • Cervicalgia    • Eustachian tube dysfunction    • GERD (gastroesophageal reflux disease)    • Glaucoma    • History of degenerative disc disease    • Hypertension    • Lumbago    • Muscle cramps    • Neuropathy    • Pacemaker     st julius    • PONV (postoperative nausea and vomiting)    • SVT (supraventricular tachycardia) (HCC)    • Thyroid disease     no med/cyst on thyroid       Past Surgical History:  Past Surgical History:   Procedure Laterality Date   • CARDIAC ABLATION      x4 - last 2019   • CARDIAC ELECTROPHYSIOLOGY MAPPING AND ABLATION     • CATARACT EXTRACTION, BILATERAL     • CHOLECYSTECTOMY     • COLONOSCOPY  02/08/2017    DR MANTILLA HISTORY OF COLON POLYPS   • COLONOSCOPY  09/30/2021    dr mantilla   • COLONOSCOPY N/A 09/30/2021    Procedure: COLONOSCOPY with BIOPSY/POLYPECTOMY COLD SNARE;  Surgeon: Tim Mantilla MD;  Location: [facility] ENDOSCOPY;  Service: Gastroenterology;  Laterality: N/A;  COLON POLYPS   • EAR TUBES Bilateral 08/30/2022    Procedure: BILATERAL MYRINGOTOMY WITH INSERTION OF EAR TUBES, NASOPHARYNGOSCOPY;  Surgeon: Ag Pepe MD;  Location: [facility] MAIN OR;  Service: ENT;  Laterality: Bilateral;   • ENDOSCOPY  06/18/2018    DR MANTILLA   • EYE SURGERY      Macular tear repair 3/1/2021 x2   • HYSTERECTOMY     • JOINT REPLACEMENT Right     total hip replacement    • JOINT REPLACEMENT Bilateral     TKR   • PACEMAKER REPLACEMENT     • TONSILLECTOMY     • VITRECTOMY PARS PLANA W/ REPAIR OF MACULAR HOLE         Family History:  Family History   Problem Relation Age of Onset   •  Heart attack Mother    • Heart disease Mother    • Diabetes Mother    • Arthritis Mother    • Heart attack Father    • Coronary artery disease Father    • Heart disease Father    • Diabetes Father    • Rectal cancer Brother 30   • Heart disease Brother    • Diabetes Brother    • Arthritis Brother    • Malig Hyperthermia Neg Hx        Social History:   reports that she has never smoked. She has never used smokeless tobacco. Drug use questions deferred to the physician. She reports that she does not drink alcohol.    Medications:   Medications Prior to Admission   Medication Sig Dispense Refill Last Dose   • albuterol sulfate  (90 Base) MCG/ACT inhaler 2 puffs Daily As Needed.      • apixaban (ELIQUIS) 5 MG tablet tablet Take 1 tablet by mouth 2 (Two) Times a Day. (Patient taking differently: Take 5 mg by mouth 2 (Two) Times a Day. Last dose 12/10/22) 180 tablet 3    • Ascorbic Acid (BETITO-C PO) Take  by mouth Daily.      • baclofen (LIORESAL) 10 MG tablet Take 1 tablet by mouth 3 (Three) Times a Day. (Patient taking differently: Take 10 mg by mouth 3 (Three) Times a Day As Needed.) 90 tablet 5    • BIOTIN PO Take 1 tablet by mouth Daily.      • budesonide-formoterol (SYMBICORT) 160-4.5 MCG/ACT inhaler INHALE 2 PUFFS BY MOUTH TWICE DAILY (Patient taking differently: 2 puffs 2 (Two) Times a Day.) 10.2 g 1    • celecoxib (CeleBREX) 200 MG capsule Take 1 capsule by mouth Daily. 90 capsule 3    • cyanocobalamin (VITAMIN B-12) 500 MCG tablet Take 500 mcg by mouth Daily.      • famotidine (PEPCID) 20 MG tablet Take 20 mg by mouth Every Night.      • LORazepam (Ativan) 0.5 MG tablet Take 1 tablet by mouth 2 (Two) Times a Day As Needed for Anxiety. 60 tablet 5    • metoprolol tartrate (LOPRESSOR) 25 MG tablet Take 1 tablet by mouth 2 (Two) Times a Day. 180 tablet 0    • olmesartan-hydrochlorothiazide (BENICAR HCT) 40-12.5 MG per tablet Take 1 tablet by mouth Daily. 90 tablet 3    • Omeprazole Magnesium (PRILOSEC OTC PO)  "Take 20 mg by mouth Daily.      • PARoxetine (Paxil) 20 MG tablet Take 1 tablet by mouth Every Morning. 90 tablet 3    • polyethylene glycol (GoLYTELY) 236 g solution TAKE AS DIRECTED BY YOUR PROVIDER (Patient taking differently: Take 4 L by mouth Every 12 (Twelve) Hours. TAKE AS DIRECTED BY YOUR PROVIDER) 4000 mL 0    • traMADol (ULTRAM) 50 MG tablet Take 1 tablet by mouth Every 6 (Six) Hours As Needed for Moderate Pain . 90 tablet 2    • vitamin D3 125 MCG (5000 UT) capsule capsule Take 1 capsule by mouth Daily.      • Zinc Sulfate (ZINC-220 PO) Take  by mouth.          Allergies:  Doxycycline, Lariam [mefloquine], Lisinopril, and Talwin [pentazocine]        Objective     Height 162.6 cm (64.02\"), weight 77.1 kg (170 lb).    Physical Exam   Constitutional: Pt is oriented to person, place, and time and well-developed, well-nourished, and in no distress.   Mouth/Throat: Oropharynx is clear and moist.   Neck: Normal range of motion.   Cardiovascular: Normal rate, regular rhythm and normal heart sounds.    Pulmonary/Chest: Effort normal and breath sounds normal.   Abdominal: Soft. Nontender  Skin: Skin is warm and dry.   Psychiatric: Mood, memory, affect and judgment normal.     Assessment & Plan     Diagnosis:  Surveillance for colon polyps and dysphagia    Anticipated Surgical Procedure:  EGD and colonoscopy    The risks, benefits, and alternatives of this procedure have been discussed with the patient or the responsible party- the patient understands and agrees to proceed.            "

## 2022-12-13 NOTE — ANESTHESIA PREPROCEDURE EVALUATION
Anesthesia Evaluation     Patient summary reviewed and Nursing notes reviewed   history of anesthetic complications:  NPO Solid Status: > 8 hours  NPO Liquid Status: > 2 hours           Airway   Mallampati: II  TM distance: >3 FB  Neck ROM: full  No difficulty expected  Dental      Pulmonary - normal exam    breath sounds clear to auscultation  (+) asthma,shortness of breath, recent URI resolved,   Cardiovascular - normal exam  Exercise tolerance: good (4-7 METS)    Rhythm: regular  Rate: normal    (+) hypertension, valvular problems/murmurs murmur, dysrhythmias Paroxysmal Atrial Fib, hyperlipidemia,       Neuro/Psych  (+) psychiatric history Depression,    GI/Hepatic/Renal/Endo    (+)  GERD well controlled,  thyroid problem hypothyroidism    Musculoskeletal     (+) neck pain,   Abdominal    Substance History - negative use     OB/GYN negative ob/gyn ROS         Other   arthritis,      ROS/Med Hx Other: PAT Nursing Notes unavailable.                   Anesthesia Plan    ASA 3     general     (Total IV Anesthesia    Patient understands anesthesia not responsible for dental damage.  )  intravenous induction     Anesthetic plan, risks, benefits, and alternatives have been provided, discussed and informed consent has been obtained with: patient.  Pre-procedure education provided  Plan discussed with CRNA.        CODE STATUS:

## 2022-12-13 NOTE — ANESTHESIA POSTPROCEDURE EVALUATION
Patient: Suzanna Santos    Procedure Summary     Date: 12/13/22 Room / Location: [facility] ENDOSCOPY 4 / [facility] ENDOSCOPY    Anesthesia Start: 1320 Anesthesia Stop: 1352    Procedures:       ESOPHAGOGASTRODUODENOSCOPY WITH BIOPSIES      SIGMOIDOSCOPY FLEXIBLE Diagnosis:       History of colon polyps      Dysphagia      (History of colon polyps [Z86.010])      (Dysphagia)    Surgeons: Tim Mixon MD Provider: Daniel Villanueva MD    Anesthesia Type: general ASA Status: 3          Anesthesia Type: general    Vitals  Vitals Value Taken Time   /71 12/13/22 1352   Temp     Pulse 64 12/13/22 1353   Resp     SpO2 99 % 12/13/22 1353   Vitals shown include unvalidated device data.        Post Anesthesia Care and Evaluation    Patient location during evaluation: bedside  Patient participation: complete - patient participated  Level of consciousness: awake  Pain management: adequate    Airway patency: patent  Anesthetic complications: No anesthetic complications  PONV Status: none  Cardiovascular status: acceptable  Respiratory status: acceptable  Hydration status: acceptable    Comments: An Anesthesiologist personally participated in the most demanding procedures (including induction and emergence if applicable) in the anesthesia plan, monitored the course of anesthesia administration at frequent intervals and remained physically present and available for immediate diagnosis and treatment of emergencies.

## 2022-12-15 DIAGNOSIS — Z12.11 ENCOUNTER FOR SCREENING FOR MALIGNANT NEOPLASM OF COLON: ICD-10-CM

## 2022-12-15 DIAGNOSIS — Z86.010 HISTORY OF COLON POLYPS: Primary | ICD-10-CM

## 2022-12-15 LAB
CYTO UR: NORMAL
LAB AP CASE REPORT: NORMAL
LAB AP CLINICAL INFORMATION: NORMAL
PATH REPORT.FINAL DX SPEC: NORMAL
PATH REPORT.GROSS SPEC: NORMAL

## 2022-12-28 ENCOUNTER — TELEPHONE (OUTPATIENT)
Dept: GASTROENTEROLOGY | Facility: CLINIC | Age: 72
End: 2022-12-28

## 2022-12-28 NOTE — TELEPHONE ENCOUNTER
REF [id number]  Per Amy with member benefits. Procedure not covered by plan.  April contacted pt

## 2022-12-29 ENCOUNTER — TELEPHONE (OUTPATIENT)
Dept: GASTROENTEROLOGY | Facility: CLINIC | Age: 72
End: 2022-12-29

## 2022-12-29 NOTE — TELEPHONE ENCOUNTER
Caller: Suzanna Santos    Relationship: Self    Best call back number: [phone]    What is the best time to reach you: ANYTIME    Who are you requesting to speak with (clinical staff, provider,  specific staff member): CLINICAL    What was the call regarding: PT IS SUPPOSED TO BE GETTING A CT SCAN 12/28. ORDERS AND PA HAVENT BEEN RECEIVED [phone]

## 2023-01-16 ENCOUNTER — OFFICE VISIT (OUTPATIENT)
Dept: INTERNAL MEDICINE | Facility: CLINIC | Age: 73
End: 2023-01-16
Payer: MEDICARE

## 2023-01-16 VITALS
OXYGEN SATURATION: 95 % | HEART RATE: 70 BPM | HEIGHT: 64 IN | SYSTOLIC BLOOD PRESSURE: 161 MMHG | BODY MASS INDEX: 29.23 KG/M2 | DIASTOLIC BLOOD PRESSURE: 72 MMHG | TEMPERATURE: 98 F | WEIGHT: 171.2 LBS

## 2023-01-16 DIAGNOSIS — G62.9 NEUROPATHY: ICD-10-CM

## 2023-01-16 DIAGNOSIS — E55.9 VITAMIN D DEFICIENCY: ICD-10-CM

## 2023-01-16 DIAGNOSIS — U09.9 COVID-19 LONG HAULER MANIFESTING CHRONIC FATIGUE: ICD-10-CM

## 2023-01-16 DIAGNOSIS — E04.1 THYROID NODULE GREATER THAN OR EQUAL TO 1 CM IN DIAMETER INCIDENTALLY NOTED ON IMAGING STUDY: ICD-10-CM

## 2023-01-16 DIAGNOSIS — G93.32 COVID-19 LONG HAULER MANIFESTING CHRONIC FATIGUE: ICD-10-CM

## 2023-01-16 DIAGNOSIS — E78.2 MIXED HYPERLIPIDEMIA: ICD-10-CM

## 2023-01-16 DIAGNOSIS — I48.0 PAF (PAROXYSMAL ATRIAL FIBRILLATION): ICD-10-CM

## 2023-01-16 DIAGNOSIS — H40.1110 PRIMARY OPEN ANGLE GLAUCOMA OF RIGHT EYE, UNSPECIFIED GLAUCOMA STAGE: ICD-10-CM

## 2023-01-16 DIAGNOSIS — D50.9 IRON DEFICIENCY ANEMIA, UNSPECIFIED IRON DEFICIENCY ANEMIA TYPE: ICD-10-CM

## 2023-01-16 DIAGNOSIS — R06.02 SHORTNESS OF BREATH: ICD-10-CM

## 2023-01-16 DIAGNOSIS — I10 ESSENTIAL HYPERTENSION: Primary | ICD-10-CM

## 2023-01-16 DIAGNOSIS — K21.9 GASTRO-ESOPHAGEAL REFLUX DISEASE WITHOUT ESOPHAGITIS: ICD-10-CM

## 2023-01-16 DIAGNOSIS — I49.5 SSS (SICK SINUS SYNDROME): ICD-10-CM

## 2023-01-16 LAB
ALBUMIN SERPL-MCNC: 4.1 G/DL (ref 3.5–5.2)
ALBUMIN/GLOB SERPL: 1.6 G/DL
ALP SERPL-CCNC: 68 U/L (ref 39–117)
ALT SERPL W P-5'-P-CCNC: 12 U/L (ref 1–33)
ANION GAP SERPL CALCULATED.3IONS-SCNC: 8.5 MMOL/L (ref 5–15)
AST SERPL-CCNC: 16 U/L (ref 1–32)
BASOPHILS # BLD AUTO: 0.12 10*3/MM3 (ref 0–0.2)
BASOPHILS NFR BLD AUTO: 1.5 % (ref 0–1.5)
BILIRUB SERPL-MCNC: 0.5 MG/DL (ref 0–1.2)
BUN SERPL-MCNC: 17 MG/DL (ref 8–23)
BUN/CREAT SERPL: 18.1 (ref 7–25)
CALCIUM SPEC-SCNC: 10.2 MG/DL (ref 8.6–10.5)
CHLORIDE SERPL-SCNC: 103 MMOL/L (ref 98–107)
CHOLEST SERPL-MCNC: 208 MG/DL (ref 0–200)
CO2 SERPL-SCNC: 28.5 MMOL/L (ref 22–29)
CREAT SERPL-MCNC: 0.94 MG/DL (ref 0.57–1)
DEPRECATED RDW RBC AUTO: 36.4 FL (ref 37–54)
EGFRCR SERPLBLD CKD-EPI 2021: 63.8 ML/MIN/1.73
EOSINOPHIL # BLD AUTO: 0.2 10*3/MM3 (ref 0–0.4)
EOSINOPHIL NFR BLD AUTO: 2.4 % (ref 0.3–6.2)
ERYTHROCYTE [DISTWIDTH] IN BLOOD BY AUTOMATED COUNT: 17.4 % (ref 12.3–15.4)
GLOBULIN UR ELPH-MCNC: 2.5 GM/DL
GLUCOSE SERPL-MCNC: 99 MG/DL (ref 65–99)
HCT VFR BLD AUTO: 39.1 % (ref 34–46.6)
HDLC SERPL-MCNC: 50 MG/DL (ref 40–60)
HGB BLD-MCNC: 11.7 G/DL (ref 12–15.9)
LDLC SERPL CALC-MCNC: 145 MG/DL (ref 0–100)
LDLC/HDLC SERPL: 2.87 {RATIO}
LYMPHOCYTES # BLD AUTO: 0.97 10*3/MM3 (ref 0.7–3.1)
LYMPHOCYTES NFR BLD AUTO: 11.9 % (ref 19.6–45.3)
MCH RBC QN AUTO: 19.4 PG (ref 26.6–33)
MCHC RBC AUTO-ENTMCNC: 29.9 G/DL (ref 31.5–35.7)
MCV RBC AUTO: 65 FL (ref 79–97)
MONOCYTES # BLD AUTO: 0.84 10*3/MM3 (ref 0.1–0.9)
MONOCYTES NFR BLD AUTO: 10.3 % (ref 5–12)
NEUTROPHILS NFR BLD AUTO: 6 10*3/MM3 (ref 1.7–7)
NEUTROPHILS NFR BLD AUTO: 73.4 % (ref 42.7–76)
PLATELET # BLD AUTO: 252 10*3/MM3 (ref 140–450)
POTASSIUM SERPL-SCNC: 4.3 MMOL/L (ref 3.5–5.2)
PROT SERPL-MCNC: 6.6 G/DL (ref 6–8.5)
RBC # BLD AUTO: 6.02 10*6/MM3 (ref 3.77–5.28)
SODIUM SERPL-SCNC: 140 MMOL/L (ref 136–145)
T4 FREE SERPL-MCNC: 1.38 NG/DL (ref 0.93–1.7)
TRIGL SERPL-MCNC: 72 MG/DL (ref 0–150)
TSH SERPL DL<=0.05 MIU/L-ACNC: 1.74 UIU/ML (ref 0.27–4.2)
VLDLC SERPL-MCNC: 13 MG/DL (ref 5–40)
WBC NRBC COR # BLD: 8.17 10*3/MM3 (ref 3.4–10.8)

## 2023-01-16 PROCEDURE — 84443 ASSAY THYROID STIM HORMONE: CPT | Performed by: INTERNAL MEDICINE

## 2023-01-16 PROCEDURE — 85025 COMPLETE CBC W/AUTO DIFF WBC: CPT | Performed by: INTERNAL MEDICINE

## 2023-01-16 PROCEDURE — 99214 OFFICE O/P EST MOD 30 MIN: CPT | Performed by: INTERNAL MEDICINE

## 2023-01-16 PROCEDURE — 80061 LIPID PANEL: CPT | Performed by: INTERNAL MEDICINE

## 2023-01-16 PROCEDURE — 84439 ASSAY OF FREE THYROXINE: CPT | Performed by: INTERNAL MEDICINE

## 2023-01-16 PROCEDURE — 80053 COMPREHEN METABOLIC PANEL: CPT | Performed by: INTERNAL MEDICINE

## 2023-01-16 NOTE — PROGRESS NOTES
"CHIEF COMPLAINT: Patient is concerned about her back pain and says the muscle relaxer baclofen is not helping as much would like to try something different,  Hyperlipidemia and Follow-up        Objective   Vital Signs  Vitals:    01/16/23 0909 01/16/23 0913   BP: (!) 187/83 161/72   Pulse: 70    Temp: 98 °F (36.7 °C)    SpO2: 95%    Weight: 77.7 kg (171 lb 3.2 oz)    Height: 162.6 cm (64.02\")       Body mass index is 29.37 kg/m².  Review of Systems   Constitutional: Negative.    HENT: Negative.    Eyes: Negative.    Respiratory: Negative.    Cardiovascular: Negative.    Gastrointestinal: Negative.    Endocrine: Negative.    Genitourinary: Negative.    Musculoskeletal: Negative.    Allergic/Immunologic: Negative.    Neurological: Negative.    Hematological: Negative.    Psychiatric/Behavioral: Negative.       Physical Exam  Constitutional:       General: She is not in acute distress.     Appearance: Normal appearance.   HENT:      Head: Normocephalic.      Mouth/Throat:      Mouth: Mucous membranes are moist.   Eyes:      Conjunctiva/sclera: Conjunctivae normal.      Pupils: Pupils are equal, round, and reactive to light.   Cardiovascular:      Rate and Rhythm: Normal rate and regular rhythm.      Pulses: Normal pulses.      Heart sounds: Normal heart sounds.   Pulmonary:      Effort: Pulmonary effort is normal.      Breath sounds: Normal breath sounds.   Abdominal:      General: Abdomen is flat. Bowel sounds are normal.      Palpations: Abdomen is soft.   Musculoskeletal:         General: No swelling. Normal range of motion.      Cervical back: Neck supple.   Skin:     General: Skin is warm and dry.      Coloration: Skin is not jaundiced.   Neurological:      General: No focal deficit present.      Mental Status: She is alert and oriented to person, place, and time. Mental status is at baseline.   Psychiatric:         Mood and Affect: Mood normal.         Behavior: Behavior normal.         Thought Content: Thought " content normal.         Judgment: Judgment normal.        Result Review :   Lab Results   Component Value Date     04/30/2019     CMP    CMP 3/11/22 8/23/22   Glucose 99 105 (A)   BUN 14 21   Creatinine 0.77 0.89   eGFR 81.6 68.6   Sodium 138 139   Potassium 4.2 4.3   Chloride 101 103   Calcium 9.9 9.5   Total Protein 6.3 6.3   Albumin 4.30 4.60   Globulin 2.0 1.7   Total Bilirubin 0.7 0.6   Alkaline Phosphatase 73 63   AST (SGOT) 19 16   ALT (SGPT) 16 12   Albumin/Globulin Ratio 2.2 2.7   BUN/Creatinine Ratio 18.2 23.6   Anion Gap 11.8 9.0   (A) Abnormal value       Comments are available for some flowsheets but are not being displayed.           CBC w/diff    CBC w/Diff 8/23/22   WBC 7.58   RBC 5.88 (A)   Hemoglobin 11.3 (A)   Hematocrit 37.3   MCV 63.4 (A)   MCH 19.2 (A)   MCHC 30.3 (A)   RDW 19.0 (A)   Platelets 206   Neutrophil Rel % 67.1   Lymphocyte Rel % 17.2 (A)   Monocyte Rel % 9.2   Eosinophil Rel % 4.9   Basophil Rel % 1.3   (A) Abnormal value             Lipid Panel    Lipid Panel 8/23/22   Total Cholesterol 188   Triglycerides 115   HDL Cholesterol 48   VLDL Cholesterol 21   LDL Cholesterol  119 (A)   LDL/HDL Ratio 2.44   (A) Abnormal value             Lab Results   Component Value Date    TSH 2.590 08/23/2022    TSH 2.530 03/11/2022    TSH 2.290 07/01/2021      Lab Results   Component Value Date    FREET4 1.28 08/23/2022    FREET4 1.22 03/11/2022    FREET4 1.2 03/22/2021                          Visit Diagnoses:    ICD-10-CM ICD-9-CM   1. Essential hypertension  I10 401.9   2. PAF (paroxysmal atrial fibrillation) ([facility])  I48.0 427.31   3. Gastro-esophageal reflux disease without esophagitis  K21.9 530.81   4. Vitamin D deficiency  E55.9 268.9   5. Shortness of breath  R06.02 786.05   6. Iron deficiency anemia, unspecified iron deficiency anemia type  D50.9 280.9   7. SSS (sick sinus syndrome) ([facility])  I49.5 427.81   8. Thyroid nodule greater than or equal to 1 cm in diameter incidentally noted on  imaging study  E04.1 241.0   9. Mixed hyperlipidemia  E78.2 272.2   10. Neuropathy  G62.9 355.9   11. Primary open angle glaucoma of right eye, unspecified glaucoma stage  H40.1110 365.11     365.70   12. COVID-19 long hauler manifesting chronic fatigue  G93.32 780.79    U09.9 139.8       Assessment and Plan   Diagnoses and all orders for this visit:    1. Essential hypertension (Primary)    2. PAF (paroxysmal atrial fibrillation) ([facility])    3. Gastro-esophageal reflux disease without esophagitis    4. Vitamin D deficiency    5. Shortness of breath    6. Iron deficiency anemia, unspecified iron deficiency anemia type    7. SSS (sick sinus syndrome) ([facility])    8. Thyroid nodule greater than or equal to 1 cm in diameter incidentally noted on imaging study    9. Mixed hyperlipidemia    10. Neuropathy    11. Primary open angle glaucoma of right eye, unspecified glaucoma stage    12. COVID-19 long hauler manifesting chronic fatigue        ANXIETY, DEPRESSION--continues Paxil 20 mg daily     Tinnitus, roaring in head had recent tympanoplasty's performed by Dr. Umaña in August 2022 recent lab work noted August 2022 and chest x-ray no active disease August 23, 2022--- should improve also with some Ativan, she has a follow-up with ENT next week    Covid infection January 2022, with probable long COVID with fatigue, discussed January 2023    acute T7 compression fracture after fall hitting her sternum with chest contusion, presyncope with head coontusion also and back pain, May 5 and sixth 2021, went to the emergency room, diagnosed with UTI,---    ARTHRITIS, IN HANDS , THUMBS , R FOOT, LOW BACK PAIN, NECK OCC.--Now with cervical radiculopathy C4-5 area, -- rheumatologist -----was diagnosed clinically with rheumatoid arthritis, CCP antibody and rheumatoid factors are negative on blood test per rheumatology September 2020,-----cont --tylenol arthritis , baclofen , arava  20mg qd ,   ---- rheumatology, okay to restart Celebrex,  March 2022 patient will start Arava when she is finished her booster shot for Covid March 2022----dr palomino , tried tramadol 3 times a day recently, but not doing it currently as of September 2022, currently taking it once a day or as needed    A. Fib with rapid rate ---- electrophysiologist again, ablation due to atrial tachycardia July 29, 2019 Previous--aflutter, abalation march 8, 2019, and pacemaker done in [place] - SECOND ABALATION JULY 29, 2019, ---WORKED WELL ,-NOW IN PACED OR SINUS RHYTHM SEPTEMBER 2021      continues--ON METOPROLOL,25 BID , ELIQUIS 5 MG BID     PAF, ABALATION AT [facility], JULY 2017,     HTN, --- continues, metoprolol 25 TWICE A DAY,  olmesartan 40/12.5 HCT,    ANEMIA, THALLESEMIA- STABLE-     Glaucoma, laser treatment of the left eye, August 2021, and macular tear in the right eye, has been seeing surgery and ophthalmology as of September 2021    R TKA, 5/13    HEART MURMUR, -aortic regurgitation moderate by echo February 2016, will follow with cardiology-- dr brown     GERD- , EGD JUNE 2018, CYNTHIA, DAMIÁNGITIS-- cont Prilosec a.m., Pepcid nightly,--- EGD December 13, 2022 Dr. Dobbs, also had a virtual colonoscopy due to difficulty with cannulating colonoscopy January 2023, virtual colonoscopy showed negative CT colonoscopy nonobstructing left renal calculus January 6, 2023     Previous cholecystectomy, previous left total knee replacement 2010, previous right total hip replacement 2008, JACINTO and BSO,  R KNEE REVISION 6/2414 , WITH DR JOY    thryoid cyst, nodule -previous thyroid ultrasounds, March 2014 and April 2016 October 2020 shows large mass in the left thyroid completely cystic, measures 7.2 cm previously measuring 6.1 cm, a 1 cm hypoechoic right thyroid nodule measuring up to 0.6 cm previously, and another smaller nodule at 0.6 cm in the right lower lobe of the thyroid,----thyroid ultrasound October 7, 2021 shows bilateral thyroid nodules large left cystic thyroid nodule has  increased in size benign characteristics follow-up again in 1 year the cyst measures 8.6 x 3 cm slightly increased in size consider ENT follow-up drainage, second opinion, versus watchful waiting,            Follow Up   Return in about 6 months (around 7/16/2023).  Patient was given instructions and counseling regarding her condition or for health maintenance advice. Please see specific information pulled into the AVS if appropriate.

## 2023-01-19 ENCOUNTER — TELEPHONE (OUTPATIENT)
Dept: INTERNAL MEDICINE | Facility: CLINIC | Age: 73
End: 2023-01-19

## 2023-01-19 RX ORDER — METHOCARBAMOL 500 MG/1
500 TABLET, FILM COATED ORAL 3 TIMES DAILY PRN
Qty: 60 TABLET | Refills: 3 | Status: SHIPPED | OUTPATIENT
Start: 2023-01-19

## 2023-01-19 NOTE — TELEPHONE ENCOUNTER
Caller: Suzanna Santos    Relationship: Self    Best call back number: [phone] .[phone]     What medication are you requesting: ROBAXIN      If a prescription is needed, what is your preferred pharmacy and phone number: [facility] - [place], [place] - [address] [place] [address], [address] - [phone]  - [phone]     Additional notes:  THE PHARMACY NEVER RECEIVED THE PRESCRIPTION FOR ROBAXIN. SHE WOULD LIKE THIS SENT ASAP

## 2023-01-27 ENCOUNTER — HOSPITAL ENCOUNTER (OUTPATIENT)
Dept: GENERAL RADIOLOGY | Facility: HOSPITAL | Age: 73
Discharge: HOME | End: 2023-01-27
Admitting: INTERNAL MEDICINE
Payer: MEDICARE

## 2023-01-27 DIAGNOSIS — K21.9 GASTRO-ESOPHAGEAL REFLUX DISEASE WITHOUT ESOPHAGITIS: ICD-10-CM

## 2023-01-27 PROCEDURE — A9270 NON-COVERED ITEM OR SERVICE: HCPCS | Performed by: INTERNAL MEDICINE

## 2023-01-27 PROCEDURE — 63710000001 BARIUM SULFATE 60 % SUSPENSION: Performed by: INTERNAL MEDICINE

## 2023-01-27 PROCEDURE — 63710000001 BARIUM SULFATE 40 % RECONSTITUTED SUSPENSION: Performed by: INTERNAL MEDICINE

## 2023-01-27 PROCEDURE — 74230 X-RAY XM SWLNG FUNCJ C+: CPT

## 2023-01-27 PROCEDURE — 63710000001 BARIUM SULFATE 98 % RECONSTITUTED SUSPENSION: Performed by: INTERNAL MEDICINE

## 2023-01-27 PROCEDURE — 92611 MOTION FLUOROSCOPY/SWALLOW: CPT

## 2023-01-27 RX ADMIN — BARIUM SULFATE 135 ML: 980 POWDER, FOR SUSPENSION ORAL at 10:39

## 2023-01-27 RX ADMIN — BARIUM SULFATE 250 ML: 0.81 POWDER, FOR SUSPENSION ORAL at 10:39

## 2023-01-27 RX ADMIN — BARIUM SULFATE 355 ML: 0.6 SUSPENSION ORAL at 10:39

## 2023-01-27 NOTE — MBS/VFSS/FEES
Outpatient - Speech Language Pathology   Swallow Modified Barium Swallow  Dale     Patient Name: Suzanna Santos  : 1950  MRN: [id number]  Today's Date: 2023               Admit Date: 2023    Visit Dx:     ICD-10-CM ICD-9-CM   1. Gastro-esophageal reflux disease without esophagitis  K21.9 530.81     Patient Active Problem List   Diagnosis   • PAF (paroxysmal atrial fibrillation) ([facility])   • Atrial tachycardia (HCC)   • H/O cardiac radiofrequency ablation   • Essential hypertension   • Presence of cardiac pacemaker   • SSS (sick sinus syndrome) (HCC)   • Iron deficiency anemia   • Fracture of vertebra due to osteoporosis ([facility])   • Thyroid nodule greater than or equal to 1 cm in diameter incidentally noted on imaging study   • Anemia   • Arthritis   • Asthma   • Cardiac murmur, unspecified   • Full thickness macular hole of left eye   • Gastro-esophageal reflux disease without esophagitis   • Mixed hyperlipidemia   • Muscle cramps   • Neck pain   • Neuropathy   • Nonexudative age-related macular degeneration of right eye   • Nontoxic single thyroid nodule   • Primary open angle glaucoma of right eye   • Shortness of breath   • Vitamin D deficiency   • Vitreous degeneration   • Exposure to COVID-19 virus   • COVID-19 virus detected   • Acute maxillary sinusitis   • Acute URI   • Eustachian tube dysfunction, bilateral   • Major depressive disorder, recurrent, moderate ([facility])   • Anxiety, generalized   • History of colon polyps   • Dysphagia   • COVID-19 long hauler manifesting chronic fatigue     Past Medical History:   Diagnosis Date   • Acid reflux    • Anemia    • Arthritis    • Asthma     inhaler prn   • Atrial fibrillation (HCC)    • Atrial tachycardia (HCC)    • Benign essential hypertension    • Cancer ([facility])     skin   • Cervicalgia    • Diverticulosis    • Eustachian tube dysfunction    • GERD (gastroesophageal reflux disease)    • Glaucoma    • History of degenerative disc disease    •  Hypertension    • Lumbago    • Muscle cramps    • Neuropathy    • Pacemaker     st julius    • PONV (postoperative nausea and vomiting)    • SVT (supraventricular tachycardia) (HCC)    • Thyroid disease     no med/cyst on thyroid     Past Surgical History:   Procedure Laterality Date   • CARDIAC ABLATION      x4 - last 2019   • CARDIAC ELECTROPHYSIOLOGY MAPPING AND ABLATION     • CATARACT EXTRACTION, BILATERAL     • CHOLECYSTECTOMY     • COLONOSCOPY  02/08/2017    DR MANTILLA HISTORY OF COLON POLYPS   • COLONOSCOPY  09/30/2021    dr mantilla   • COLONOSCOPY N/A 09/30/2021    Procedure: COLONOSCOPY with BIOPSY/POLYPECTOMY COLD SNARE;  Surgeon: Tim Mantilla MD;  Location: [facility] ENDOSCOPY;  Service: Gastroenterology;  Laterality: N/A;  COLON POLYPS   • EAR TUBES Bilateral 08/30/2022    Procedure: BILATERAL MYRINGOTOMY WITH INSERTION OF EAR TUBES, NASOPHARYNGOSCOPY;  Surgeon: Ag Pepe MD;  Location: [facility] MAIN OR;  Service: ENT;  Laterality: Bilateral;   • ENDOSCOPY  06/18/2018    DR MANTILLA   • ENDOSCOPY N/A 12/13/2022    Procedure: ESOPHAGOGASTRODUODENOSCOPY WITH BIOPSIES;  Surgeon: Tim Mantilla MD;  Location: [facility] ENDOSCOPY;  Service: Gastroenterology;  Laterality: N/A;  HIATAL HERNIA   • EYE SURGERY      Macular tear repair 3/1/2021 x2   • HYSTERECTOMY     • JOINT REPLACEMENT Right     total hip replacement    • JOINT REPLACEMENT Bilateral     TKR   • PACEMAKER REPLACEMENT     • SIGMOIDOSCOPY  12/13/2022    Procedure: SIGMOIDOSCOPY FLEXIBLE;  Surgeon: Tim Mantilla MD;  Location: [facility] ENDOSCOPY;  Service: Gastroenterology;;  DIVERTICULOSIS   • TONSILLECTOMY     • VITRECTOMY PARS PLANA W/ REPAIR OF MACULAR HOLE       MODIFIED BARIUM SWALLOW STUDY: SPEECH PATHOLOGY REPORT    PERTINENT INFORMATION:  This patient is a 72year old referred for modified barium swallow due to reported globus sensation with solids    Patient was referred for an MBSS by Dr. Mantilla to rule out aspiration as well as  to determine appropriate treatment plan for this patient.      PROCEDURE:    Patient was alert and cooperative.  The patient was viewed in lateral projection.  The following Ba consistencies were administered: Thin liquids, purée consistencies and regular solids.  The following compensatory swallowing strategies were performed: Double swallow, liquid wash      RESULTS:    1.  Oral stage is characterized by good bolus preparation and control.  Timely oral transit.  Pharyngeal phase appears timely.  Reduced epiglottic inversion is noted this results in vallecular residue after the swallow with solids.  Base of tongue retraction and laryngeal elevation appeared to be within functional limits.  Double swallow and liquid wash are effective in clearing pharyngeal residue.  Only 1 episode of shallow transient penetration with straw trials of thin liquids.      IMPRESSIONS:    Patient demonstrated mild pharyngeal dysphagia characterized by vallecular residue after the swallow which appears to be due to reduced epiglottic inversion.     FUNCTIONAL DEFICIT: Patient scored level 6 of 7 on Functional Communication Measures for swallowing indicating a 1-19% limitation in function for current status, goal status, and discharge status.      RECOMMENDATIONS:   1.  Regular diet-thin liquids  2.  Alternate solids and liquids  3.  Double swallow   4.  Add sauce/gravy to dry foods          YES: Patient/responsible party agrees with the plan of care and has been informed of all alternatives, risks and benefits.    Thank you for this referral.         EDUCATION  The patient has been educated in the following areas:   Dysphagia (Swallowing Impairment).              Sharmila Pham, SLP  1/27/2023

## 2023-02-03 DIAGNOSIS — I48.91 ATRIAL FIBRILLATION, UNSPECIFIED TYPE: ICD-10-CM

## 2023-03-01 RX ORDER — CELECOXIB 200 MG/1
200 CAPSULE ORAL DAILY
Qty: 90 CAPSULE | Refills: 3 | Status: SHIPPED | OUTPATIENT
Start: 2023-03-01

## 2023-03-21 PROBLEM — I49.5 SSS (SICK SINUS SYNDROME): Status: RESOLVED | Noted: 2019-12-19 | Resolved: 2023-03-21

## 2023-03-21 PROBLEM — J06.9 ACUTE URI: Status: RESOLVED | Noted: 2022-01-31 | Resolved: 2023-03-21

## 2023-03-21 PROBLEM — Z20.822 EXPOSURE TO COVID-19 VIRUS: Status: RESOLVED | Noted: 2022-01-17 | Resolved: 2023-03-21

## 2023-03-21 PROBLEM — R25.2 MUSCLE CRAMPS: Status: RESOLVED | Noted: 2022-01-17 | Resolved: 2023-03-21

## 2023-03-21 PROBLEM — H69.83 EUSTACHIAN TUBE DYSFUNCTION, BILATERAL: Status: RESOLVED | Noted: 2022-08-17 | Resolved: 2023-03-21

## 2023-03-21 PROBLEM — I47.1 ATRIAL TACHYCARDIA (HCC): Status: RESOLVED | Noted: 2019-07-13 | Resolved: 2023-03-21

## 2023-03-21 PROBLEM — R13.10 DYSPHAGIA: Status: RESOLVED | Noted: 2022-10-24 | Resolved: 2023-03-21

## 2023-03-21 PROBLEM — U07.1 COVID-19 VIRUS DETECTED: Status: RESOLVED | Noted: 2022-01-25 | Resolved: 2023-03-21

## 2023-03-21 PROBLEM — R06.02 SHORTNESS OF BREATH: Status: RESOLVED | Noted: 2022-01-17 | Resolved: 2023-03-21

## 2023-03-21 PROBLEM — H69.93 EUSTACHIAN TUBE DYSFUNCTION, BILATERAL: Status: RESOLVED | Noted: 2022-08-17 | Resolved: 2023-03-21

## 2023-03-21 PROBLEM — I47.19 ATRIAL TACHYCARDIA: Status: RESOLVED | Noted: 2019-07-13 | Resolved: 2023-03-21

## 2023-03-21 PROBLEM — J01.00 ACUTE MAXILLARY SINUSITIS: Status: RESOLVED | Noted: 2022-01-31 | Resolved: 2023-03-21

## 2023-03-21 PROBLEM — M54.2 NECK PAIN: Status: RESOLVED | Noted: 2022-01-17 | Resolved: 2023-03-21

## 2023-03-21 PROBLEM — K63.5 COLON POLYPS: Status: ACTIVE | Noted: 2023-03-21

## 2023-03-23 ENCOUNTER — OFFICE VISIT (OUTPATIENT)
Dept: INTERNAL MEDICINE | Facility: CLINIC | Age: 73
End: 2023-03-23
Payer: MEDICARE

## 2023-03-23 VITALS
DIASTOLIC BLOOD PRESSURE: 80 MMHG | HEIGHT: 64 IN | SYSTOLIC BLOOD PRESSURE: 143 MMHG | BODY MASS INDEX: 29.67 KG/M2 | TEMPERATURE: 98 F | OXYGEN SATURATION: 95 % | HEART RATE: 66 BPM | WEIGHT: 173.8 LBS

## 2023-03-23 DIAGNOSIS — J45.41 MODERATE PERSISTENT ASTHMA WITH ACUTE EXACERBATION: ICD-10-CM

## 2023-03-23 DIAGNOSIS — J06.9 ACUTE URI: Primary | ICD-10-CM

## 2023-03-23 DIAGNOSIS — J01.00 ACUTE NON-RECURRENT MAXILLARY SINUSITIS: ICD-10-CM

## 2023-03-23 PROCEDURE — 3079F DIAST BP 80-89 MM HG: CPT | Performed by: INTERNAL MEDICINE

## 2023-03-23 PROCEDURE — 99213 OFFICE O/P EST LOW 20 MIN: CPT | Performed by: INTERNAL MEDICINE

## 2023-03-23 PROCEDURE — 3077F SYST BP >= 140 MM HG: CPT | Performed by: INTERNAL MEDICINE

## 2023-03-23 RX ORDER — PREDNISONE 20 MG/1
40 TABLET ORAL DAILY
Qty: 6 TABLET | Refills: 0 | Status: SHIPPED | OUTPATIENT
Start: 2023-03-23

## 2023-03-23 RX ORDER — AMOXICILLIN AND CLAVULANATE POTASSIUM 875; 125 MG/1; MG/1
1 TABLET, FILM COATED ORAL 2 TIMES DAILY
Qty: 20 TABLET | Refills: 0 | Status: SHIPPED | OUTPATIENT
Start: 2023-03-23

## 2023-03-23 RX ORDER — HYDROCODONE POLISTIREX AND CHLORPHENIRAMINE POLISTIREX 10; 8 MG/5ML; MG/5ML
5 SUSPENSION, EXTENDED RELEASE ORAL EVERY 12 HOURS PRN
Qty: 100 ML | Refills: 0 | Status: SHIPPED | OUTPATIENT
Start: 2023-03-23

## 2023-03-23 NOTE — PROGRESS NOTES
"CHIEF COMPLAINT/ HPI:  Sinus Problem patient's had bloody nasal drainage, cough congestion not feeling well for the past several days, taking over-the-counter medications,    No fevers, no nausea vomiting or diarrhea          Objective   Vital Signs  Vitals:    03/23/23 1236   BP: 143/80   Pulse: 66   Temp: 98 °F (36.7 °C)   SpO2: 95%   Weight: 78.8 kg (173 lb 12.8 oz)   Height: 162.6 cm (64.02\")      Body mass index is 29.82 kg/m².  Review of Systems as above,  Physical Exam lungs show some raspy congested cough with some faint expiratory wheezes bilateral posterior cardiac exam regular rhythm neck supple throat shows clear mucosa nasal passages show marked edema redness and fresh blood bilateral nasal septum area, patient is alert and oriented x3  Result Review :   Lab Results   Component Value Date     04/30/2019     CMP    CMP 8/23/22 1/16/23   Glucose 105 (A) 99   BUN 21 17   Creatinine 0.89 0.94   eGFR 68.6 63.8   Sodium 139 140   Potassium 4.3 4.3   Chloride 103 103   Calcium 9.5 10.2   Total Protein 6.3 6.6   Albumin 4.60 4.1   Globulin 1.7 2.5   Total Bilirubin 0.6 0.5   Alkaline Phosphatase 63 68   AST (SGOT) 16 16   ALT (SGPT) 12 12   Albumin/Globulin Ratio 2.7 1.6   BUN/Creatinine Ratio 23.6 18.1   Anion Gap 9.0 8.5   (A) Abnormal value       Comments are available for some flowsheets but are not being displayed.           CBC w/diff    CBC w/Diff 8/23/22 1/16/23   WBC 7.58 8.17   RBC 5.88 (A) 6.02 (A)   Hemoglobin 11.3 (A) 11.7 (A)   Hematocrit 37.3 39.1   MCV 63.4 (A) 65.0 (A)   MCH 19.2 (A) 19.4 (A)   MCHC 30.3 (A) 29.9 (A)   RDW 19.0 (A) 17.4 (A)   Platelets 206 252   Neutrophil Rel % 67.1 73.4   Lymphocyte Rel % 17.2 (A) 11.9 (A)   Monocyte Rel % 9.2 10.3   Eosinophil Rel % 4.9 2.4   Basophil Rel % 1.3 1.5   (A) Abnormal value             Lipid Panel    Lipid Panel 8/23/22 1/16/23   Total Cholesterol 188 208 (A)   Triglycerides 115 72   HDL Cholesterol 48 50   VLDL Cholesterol 21 13   LDL " Cholesterol  119 (A) 145 (A)   LDL/HDL Ratio 2.44 2.87   (A) Abnormal value             Lab Results   Component Value Date    TSH 1.740 01/16/2023    TSH 2.590 08/23/2022    TSH 2.530 03/11/2022      Lab Results   Component Value Date    FREET4 1.38 01/16/2023    FREET4 1.28 08/23/2022    FREET4 1.22 03/11/2022                          Visit Diagnoses:    ICD-10-CM ICD-9-CM   1. Acute URI  J06.9 465.9   2. Moderate persistent asthma with acute exacerbation  J45.41 493.92   3. Acute non-recurrent maxillary sinusitis  J01.00 461.0       Assessment and Plan   Diagnoses and all orders for this visit:    1. Acute URI (Primary)  -     Hydrocod Gary-Chlorphe Gary ER (Tussionex Pennkinetic ER) 10-8 MG/5ML ER suspension; Take 5 mL by mouth Every 12 (Twelve) Hours As Needed for Cough.  Dispense: 100 mL; Refill: 0    2. Moderate persistent asthma with acute exacerbation  -     Hydrocod Gary-Chlorphe Gary ER (Tussionex Pennkinetic ER) 10-8 MG/5ML ER suspension; Take 5 mL by mouth Every 12 (Twelve) Hours As Needed for Cough.  Dispense: 100 mL; Refill: 0    3. Acute non-recurrent maxillary sinusitis  -     Hydrocod Gary-Chlorphe Gary ER (Tussionex Pennkinetic ER) 10-8 MG/5ML ER suspension; Take 5 mL by mouth Every 12 (Twelve) Hours As Needed for Cough.  Dispense: 100 mL; Refill: 0    Other orders  -     amoxicillin-clavulanate (Augmentin) 875-125 MG per tablet; Take 1 tablet by mouth 2 (Two) Times a Day.  Dispense: 20 tablet; Refill: 0  -     predniSONE (DELTASONE) 20 MG tablet; Take 2 tablets by mouth Daily.  Dispense: 6 tablet; Refill: 0             Acute upper respiratory infection    Acute sinusitis with bloody nasal drainage    Asthma with wheezing reactive airways,    Treatment options discussed with patient        Follow Up   No follow-ups on file.  Patient was given instructions and counseling regarding her condition or for health maintenance advice. Please see specific information pulled into the AVS if appropriate.

## 2023-03-29 RX ORDER — BUDESONIDE AND FORMOTEROL FUMARATE DIHYDRATE 160; 4.5 UG/1; UG/1
2 AEROSOL RESPIRATORY (INHALATION) 2 TIMES DAILY
Qty: 10.2 G | Refills: 1 | Status: SHIPPED | OUTPATIENT
Start: 2023-03-29

## 2023-05-09 RX ORDER — APIXABAN 5 MG/1
TABLET, FILM COATED ORAL
Qty: 180 TABLET | Refills: 3 | Status: SHIPPED | OUTPATIENT
Start: 2023-05-09

## 2023-06-09 ENCOUNTER — PROCEDURE VISIT (OUTPATIENT)
Dept: OTOLARYNGOLOGY | Facility: CLINIC | Age: 73
End: 2023-06-09
Payer: MEDICARE

## 2023-06-09 DIAGNOSIS — H90.A11 CONDUCTIVE HEARING LOSS OF RIGHT EAR WITH RESTRICTED HEARING OF LEFT EAR: Primary | ICD-10-CM

## 2023-06-09 DIAGNOSIS — H90.A32 MIXED CONDUCTIVE AND SENSORINEURAL HEARING LOSS OF LEFT EAR WITH RESTRICTED HEARING OF RIGHT EAR: ICD-10-CM

## 2023-06-30 PROBLEM — J01.00 ACUTE NON-RECURRENT MAXILLARY SINUSITIS: Status: RESOLVED | Noted: 2022-01-31 | Resolved: 2023-06-30

## 2023-06-30 PROBLEM — G93.32 COVID-19 LONG HAULER MANIFESTING CHRONIC FATIGUE: Status: RESOLVED | Noted: 2023-01-16 | Resolved: 2023-06-30

## 2023-06-30 PROBLEM — U09.9 COVID-19 LONG HAULER MANIFESTING CHRONIC FATIGUE: Status: RESOLVED | Noted: 2023-01-16 | Resolved: 2023-06-30

## 2023-06-30 PROBLEM — J06.9 ACUTE URI: Status: RESOLVED | Noted: 2022-01-31 | Resolved: 2023-06-30

## 2023-07-17 PROBLEM — Z00.00 MEDICARE ANNUAL WELLNESS VISIT, SUBSEQUENT: Status: ACTIVE | Noted: 2023-07-17

## 2023-07-17 PROBLEM — I48.91 ATRIAL FIBRILLATION: Status: ACTIVE | Noted: 2023-07-17

## 2023-07-17 PROBLEM — Z29.8 NEED FOR MALARIA PROPHYLAXIS: Status: ACTIVE | Noted: 2023-07-17

## 2023-07-17 PROBLEM — Z29.89 NEED FOR MALARIA PROPHYLAXIS: Status: ACTIVE | Noted: 2023-07-17

## 2023-07-26 RX ORDER — ATOVAQUONE AND PROGUANIL HYDROCHLORIDE 250; 100 MG/1; MG/1
1 TABLET, FILM COATED ORAL
Qty: 25 TABLET | Refills: 0 | OUTPATIENT
Start: 2023-07-26

## 2023-07-26 NOTE — TELEPHONE ENCOUNTER
Caller: Suzanna Santos    Relationship: Self    Best call back number: [phone]    Requested Prescriptions:   Requested Prescriptions     Pending Prescriptions Disp Refills    atovaquone-proguanil (Malarone) 250-100 MG tablet per tablet 25 tablet 0     Sig: Take 1 tablet by mouth Daily.        Pharmacy where request should be sent: [address], SUITE [facility] - [phone] [facility] [phone] FX     Last office visit with prescribing clinician: 7/17/2023   Last telemedicine visit with prescribing clinician: Visit date not found   Next office visit with prescribing clinician: 1/17/2024     Additional details provided by patient: PATIENT IS CURRENTLY OUT OF THIS SCRIPT. SHE IS NEEDING 25 QUANTITY.     Does the patient have less than a 3 day supply:  [x] Yes  [] No    Would you like a call back once the refill request has been completed: [] Yes [x] No    If the office needs to give you a call back, can they leave a voicemail: [] Yes [x] No    Que Castro Rep   07/26/23 13:59 EDT

## 2023-08-11 ENCOUNTER — TELEPHONE (OUTPATIENT)
Dept: INTERNAL MEDICINE | Facility: CLINIC | Age: 73
End: 2023-08-11
Payer: MEDICARE

## 2023-08-11 ENCOUNTER — HOSPITAL ENCOUNTER (OUTPATIENT)
Dept: GENERAL RADIOLOGY | Facility: HOSPITAL | Age: 73
Discharge: HOME | End: 2023-08-11
Payer: MEDICARE

## 2023-08-11 ENCOUNTER — OFFICE VISIT (OUTPATIENT)
Dept: INTERNAL MEDICINE | Facility: CLINIC | Age: 73
End: 2023-08-11
Payer: MEDICARE

## 2023-08-11 VITALS
BODY MASS INDEX: 31.07 KG/M2 | DIASTOLIC BLOOD PRESSURE: 79 MMHG | HEIGHT: 64 IN | WEIGHT: 182 LBS | HEART RATE: 60 BPM | TEMPERATURE: 97.2 F | SYSTOLIC BLOOD PRESSURE: 140 MMHG | OXYGEN SATURATION: 98 %

## 2023-08-11 DIAGNOSIS — J22 LOWER RESPIRATORY INFECTION (E.G., BRONCHITIS, PNEUMONIA, PNEUMONITIS, PULMONITIS): ICD-10-CM

## 2023-08-11 DIAGNOSIS — R05.3 CHRONIC COUGH: ICD-10-CM

## 2023-08-11 DIAGNOSIS — J20.9 ACUTE BRONCHITIS, UNSPECIFIED ORGANISM: ICD-10-CM

## 2023-08-11 DIAGNOSIS — J45.41 MODERATE PERSISTENT ASTHMA WITH ACUTE EXACERBATION: Primary | ICD-10-CM

## 2023-08-11 PROCEDURE — 71046 X-RAY EXAM CHEST 2 VIEWS: CPT

## 2023-08-11 PROCEDURE — 1160F RVW MEDS BY RX/DR IN RCRD: CPT

## 2023-08-11 PROCEDURE — 1159F MED LIST DOCD IN RCRD: CPT

## 2023-08-11 PROCEDURE — 3078F DIAST BP <80 MM HG: CPT

## 2023-08-11 PROCEDURE — 3077F SYST BP >= 140 MM HG: CPT

## 2023-08-11 RX ORDER — BENZONATATE 100 MG/1
100 CAPSULE ORAL 3 TIMES DAILY PRN
Qty: 30 CAPSULE | Refills: 0 | Status: SHIPPED | OUTPATIENT
Start: 2023-08-11

## 2023-08-11 RX ORDER — AMOXICILLIN AND CLAVULANATE POTASSIUM 875; 125 MG/1; MG/1
1 TABLET, FILM COATED ORAL 2 TIMES DAILY
Qty: 20 TABLET | Refills: 0 | Status: SHIPPED | OUTPATIENT
Start: 2023-08-11 | End: 2023-08-21

## 2023-08-11 RX ORDER — AZITHROMYCIN 250 MG/1
TABLET, FILM COATED ORAL
Qty: 6 TABLET | Refills: 0 | Status: SHIPPED | OUTPATIENT
Start: 2023-08-11

## 2023-08-11 RX ORDER — PREDNISONE 20 MG/1
TABLET ORAL
Qty: 11 TABLET | Refills: 0 | Status: SHIPPED | OUTPATIENT
Start: 2023-08-11

## 2023-08-11 RX ORDER — FLUTICASONE FUROATE, UMECLIDINIUM BROMIDE AND VILANTEROL TRIFENATATE 200; 62.5; 25 UG/1; UG/1; UG/1
1 POWDER RESPIRATORY (INHALATION) DAILY
Qty: 28 EACH | Refills: 1 | COMMUNITY
Start: 2023-08-11

## 2023-08-11 RX ORDER — IPRATROPIUM BROMIDE AND ALBUTEROL SULFATE 2.5; .5 MG/3ML; MG/3ML
3 SOLUTION RESPIRATORY (INHALATION) EVERY 4 HOURS PRN
Qty: 360 ML | Refills: 1 | Status: SHIPPED | OUTPATIENT
Start: 2023-08-11

## 2023-08-11 NOTE — ASSESSMENT & PLAN NOTE
Asthma is worsening.  DuoNeb sent to pharmacy.  Also will trial Trelegy.  Samples provided in the office.  She will stop the Symbicort while she is on the Trelegy.

## 2023-08-11 NOTE — ASSESSMENT & PLAN NOTE
Patient complaints of cough and wheezing. She states the cough has been present for a couple of months. She states she does not feel sick but she cannot get rid of the cough. She reports brining up a small amount of mucous. She is on symbicort BID and albuterol PRN. She is not needing the albuterol. She states that the wheezing has gotten worse as well over the last few days. She states that her chest burns from the cough. She is taking delsym OTC and is not getting much relief. She denies any fever, chills, myaglias, shortness of breath.   We will trial of trelegy.  Start prednisone taper  Will get imaging as well.

## 2023-08-11 NOTE — PROGRESS NOTES
Chief Complaint  Cough (Patient is complaining of a cough for about 2 months. She states she feels fine but the cough is still there. States she has not been around anyone sick. wheezing)    History of Present Illness  SUBJECTIVE  Suzanna Santos presents to [facility] with complaints of cough and wheezing. She states the cough has been present for a couple of months. She states she does not feel sick but she cannot get rid of the cough. She reports brining up a small amount of mucous. She is on symbicort BID and albuterol PRN. She is not needing the albuterol. She states that the wheezing has gotten worse as well over the last few days. She states that her chest burns from the cough. She is taking delsym OTC and is not getting much relief.       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 but the roof line required additrional ablation   ú 3/19 and was found to have an atypical rigfht atrial flutter, an 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 and a left atrial tachycardia near the AV node   ú She developed recurrent SVT in the 130-150 range and was taken back to the lab    PONV (postoperative nausea and vomiting)     SVT (supraventricular tachycardia)     Thyroid disease     no med/cyst on thyroid      Family History   Problem Relation Age of Onset    Heart attack Mother     Heart disease Mother     Diabetes Mother      Arthritis Mother     Heart attack Father     Coronary artery disease Father     Heart disease Father     Diabetes Father     Rectal cancer Brother 30    Heart disease Brother     Diabetes Brother     Arthritis Brother     Malig Hyperthermia Neg Hx       Past Surgical History:   Procedure Laterality Date    CARDIAC ABLATION      x4 - last 2019    CARDIAC ELECTROPHYSIOLOGY MAPPING AND ABLATION      CATARACT EXTRACTION, BILATERAL      CHOLECYSTECTOMY      COLONOSCOPY  02/08/2017    DR MANTILLA HISTORY OF COLON POLYPS    COLONOSCOPY  09/30/2021    dr mantilla    COLONOSCOPY N/A 09/30/2021    Procedure: COLONOSCOPY with BIOPSY/POLYPECTOMY COLD SNARE;  Surgeon: Tim Mantilla MD;  Location: [facility] ENDOSCOPY;  Service: Gastroenterology;  Laterality: N/A;  COLON POLYPS    EAR TUBES Bilateral 08/30/2022    Procedure: BILATERAL MYRINGOTOMY WITH INSERTION OF EAR TUBES, NASOPHARYNGOSCOPY;  Surgeon: Ag Pepe MD;  Location: [facility] MAIN OR;  Service: ENT;  Laterality: Bilateral;    ENDOSCOPY  06/18/2018    DR MANTILLA    ENDOSCOPY N/A 12/13/2022    Procedure: ESOPHAGOGASTRODUODENOSCOPY WITH BIOPSIES;  Surgeon: Tim Mantilla MD;  Location: [facility] ENDOSCOPY;  Service: Gastroenterology;  Laterality: N/A;  HIATAL HERNIA    EYE SURGERY      Macular tear repair 3/1/2021 x2    HYSTERECTOMY      JOINT REPLACEMENT Right     total hip replacement     JOINT REPLACEMENT Bilateral     TKR    PACEMAKER REPLACEMENT      SIGMOIDOSCOPY  12/13/2022    Procedure: SIGMOIDOSCOPY FLEXIBLE;  Surgeon: Tim Mantilla MD;  Location: [facility] ENDOSCOPY;  Service: Gastroenterology;;  DIVERTICULOSIS    TONSILLECTOMY      VITRECTOMY PARS PLANA W/ REPAIR OF MACULAR HOLE          Current Outpatient Medications:     atovaquone-proguanil (Malarone) 250-100 MG tablet per tablet, Take 1 tablet by mouth Daily., Disp: 25 tablet, Rfl: 0    budesonide-formoterol (SYMBICORT) 160-4.5 MCG/ACT inhaler, Inhale 2 puffs 2 (Two) Times a Day., Disp: 10.2 g,  Rfl: 1    celecoxib (CeleBREX) 200 MG capsule, TAKE 1 CAPSULE BY MOUTH DAILY., Disp: 90 capsule, Rfl: 3    Eliquis 5 MG tablet tablet, TAKE 1 TABLET EVERY 12     HOURS, Disp: 180 tablet, Rfl: 3    famotidine (PEPCID) 20 MG tablet, Take 1 tablet by mouth Every Night., Disp: , Rfl:     methocarbamol (Robaxin) 500 MG tablet, Take 1 tablet by mouth 3 (Three) Times a Day As Needed for Muscle Spasms., Disp: 60 tablet, Rfl: 3    metoprolol tartrate (LOPRESSOR) 25 MG tablet, TAKE 1 TABLET BY MOUTH 2 (TWO) TIMES A DAY., Disp: 180 tablet, Rfl: 3    olmesartan-hydrochlorothiazide (BENICAR HCT) 40-12.5 MG per tablet, Take 1 tablet by mouth Daily., Disp: 90 tablet, Rfl: 3    Omeprazole Magnesium (PRILOSEC OTC PO), Take 20 mg by mouth Daily., Disp: , Rfl:     PARoxetine (Paxil) 20 MG tablet, Take 1 tablet by mouth Every Morning., Disp: 90 tablet, Rfl: 3    traMADol (ULTRAM) 50 MG tablet, Take 1 tablet by mouth Every 6 (Six) Hours As Needed for Moderate Pain ., Disp: 90 tablet, Rfl: 2    albuterol sulfate  (90 Base) MCG/ACT inhaler, 2 puffs Daily As Needed. (Patient not taking: Reported on 8/11/2023), Disp: , Rfl:     amoxicillin-clavulanate (AUGMENTIN) 875-125 MG per tablet, Take 1 tablet by mouth 2 (Two) Times a Day for 10 days. Take with food., Disp: 20 tablet, Rfl: 0    Ascorbic Acid (BETITO-C PO), Take  by mouth Daily., Disp: , Rfl:     azithromycin (Zithromax Z-Donte) 250 MG tablet, Take 2 tablets by mouth on day 1, then 1 tablet daily on days 2-5, Disp: 6 tablet, Rfl: 0    benzonatate (Tessalon Perles) 100 MG capsule, Take 1 capsule by mouth 3 (Three) Times a Day As Needed for Cough., Disp: 30 capsule, Rfl: 0    BIOTIN PO, Take 1 tablet by mouth Daily., Disp: , Rfl:     cyanocobalamin (VITAMIN B-12) 500 MCG tablet, Take 1 tablet by mouth Daily., Disp: , Rfl:     Fluticasone-Umeclidin-Vilant (Trelegy Ellipta) 200-62.5-25 MCG/ACT aerosol powder , Inhale 1 puff Daily., Disp: 28 each, Rfl: 1    ipratropium-albuterol  "(DUO-NEB) 0.5-2.5 mg/3 ml nebulizer, Take 3 mL by nebulization Every 4 (Four) Hours As Needed for Wheezing., Disp: 360 mL, Rfl: 1    pravastatin (PRAVACHOL) 10 MG tablet, Take 1 tablet by mouth Every Night. (Patient not taking: Reported on 8/11/2023), Disp: 90 tablet, Rfl: 3    predniSONE (DELTASONE) 20 MG tablet, Take 2 tablets daily for 3 days, then take 1 tablet daily for 3 days, then take 1/2 tablet daily for 4 days, Disp: 11 tablet, Rfl: 0    vitamin D3 125 MCG (5000 UT) capsule capsule, Take 1 capsule by mouth Daily., Disp: , Rfl:     Zinc Sulfate (ZINC-220 PO), Take  by mouth., Disp: , Rfl:     OBJECTIVE  Vital Signs:   /79 (BP Location: Right arm, Patient Position: Sitting, Cuff Size: Large Adult)   Pulse 60   Temp 97.2 øF (36.2 øC) (Temporal)   Ht 162.6 cm (64.02\")   Wt 82.6 kg (182 lb)   SpO2 98%   BMI 31.22 kg/mý    Estimated body mass index is 31.22 kg/mý as calculated from the following:    Height as of this encounter: 162.6 cm (64.02\").    Weight as of this encounter: 82.6 kg (182 lb).     Wt Readings from Last 3 Encounters:   08/11/23 82.6 kg (182 lb)   07/17/23 78.9 kg (174 lb)   07/03/23 80.9 kg (178 lb 6.4 oz)     BP Readings from Last 3 Encounters:   08/11/23 140/79   07/17/23 158/83   07/03/23 138/73       Physical Exam  Vitals and nursing note reviewed.   Constitutional:       Appearance: Normal appearance.   HENT:      Head: Normocephalic.   Eyes:      Extraocular Movements: Extraocular movements intact.      Conjunctiva/sclera: Conjunctivae normal.   Cardiovascular:      Rate and Rhythm: Normal rate and regular rhythm.      Heart sounds: Normal heart sounds. No murmur heard.  Pulmonary:      Effort: Pulmonary effort is normal.      Breath sounds: Wheezing present. No rales.   Abdominal:      General: Bowel sounds are normal.      Palpations: Abdomen is soft.      Tenderness: There is no abdominal tenderness. There is no guarding.   Musculoskeletal:         General: No swelling. " Normal range of motion.      Cervical back: Normal range of motion and neck supple.      Right lower leg: No edema.      Left lower leg: No edema.   Skin:     General: Skin is warm and dry.   Neurological:      General: No focal deficit present.      Mental Status: She is alert and oriented to person, place, and time. Mental status is at baseline.   Psychiatric:         Mood and Affect: Mood normal.         Behavior: Behavior normal.         Thought Content: Thought content normal.         Judgment: Judgment normal.        Result Review        No Images in the past 120 days found..     The above data has been reviewed by ROSHAN Sheffield 08/11/2023 09:47 EDT.          Patient Care Team:  Joshua Lee MD as PCP - General (Internal Medicine)            ASSESSMENT & PLAN    Diagnoses and all orders for this visit:    1. Moderate persistent asthma with acute exacerbation (Primary)  Assessment & Plan:  Asthma is worsening.  DuoNeb sent to pharmacy.  Also will trial Trelegy.  Samples provided in the office.  She will stop the Symbicort while she is on the Trelegy.      2. Chronic cough  Assessment & Plan:  Patient complaints of cough and wheezing. She states the cough has been present for a couple of months. She states she does not feel sick but she cannot get rid of the cough. She reports brining up a small amount of mucous. She is on symbicort BID and albuterol PRN. She is not needing the albuterol. She states that the wheezing has gotten worse as well over the last few days. She states that her chest burns from the cough. She is taking delsym OTC and is not getting much relief. She denies any fever, chills, myaglias, shortness of breath.   We will trial of trelegy.  Start prednisone taper  Will get imaging as well.           3. Lower respiratory infection (e.g., bronchitis, pneumonia, pneumonitis, pulmonitis)  Assessment & Plan:  Chest xray suspicious for PNA  Start abx and steroids.  Follow up as  scheduled.    Orders:  -     XR Chest PA & Lateral; Future  -     ipratropium-albuterol (DUO-NEB) 0.5-2.5 mg/3 ml nebulizer; Take 3 mL by nebulization Every 4 (Four) Hours As Needed for Wheezing.  Dispense: 360 mL; Refill: 1  -     predniSONE (DELTASONE) 20 MG tablet; Take 2 tablets daily for 3 days, then take 1 tablet daily for 3 days, then take 1/2 tablet daily for 4 days  Dispense: 11 tablet; Refill: 0  -     CT Chest Without Contrast; Future  -     amoxicillin-clavulanate (AUGMENTIN) 875-125 MG per tablet; Take 1 tablet by mouth 2 (Two) Times a Day for 10 days. Take with food.  Dispense: 20 tablet; Refill: 0  -     azithromycin (Zithromax Z-Donte) 250 MG tablet; Take 2 tablets by mouth on day 1, then 1 tablet daily on days 2-5  Dispense: 6 tablet; Refill: 0    Other orders  -     benzonatate (Tessalon Perles) 100 MG capsule; Take 1 capsule by mouth 3 (Three) Times a Day As Needed for Cough.  Dispense: 30 capsule; Refill: 0  -     Fluticasone-Umeclidin-Vilant (Trelegy Ellipta) 200-62.5-25 MCG/ACT aerosol powder ; Inhale 1 puff Daily.  Dispense: 28 each; Refill: 1         Tobacco Use: Low Risk     Smoking Tobacco Use: Never    Smokeless Tobacco Use: Never    Passive Exposure: Never       Follow Up     Return in about 3 weeks (around 9/1/2023) for Recheck.    Please note that portions of this note were completed with a voice recognition program.    Patient was given instructions and counseling regarding her condition or for health maintenance advice. Please see specific information pulled into the AVS if appropriate.   I have reviewed information obtained and documented by others and I have confirmed the accuracy of this documented note.    ROSHAN Sheffield

## 2023-08-25 ENCOUNTER — HOSPITAL ENCOUNTER (OUTPATIENT)
Dept: CT IMAGING | Facility: HOSPITAL | Age: 73
Discharge: HOME | End: 2023-08-25
Payer: MEDICARE

## 2023-08-25 DIAGNOSIS — J22 LOWER RESPIRATORY INFECTION (E.G., BRONCHITIS, PNEUMONIA, PNEUMONITIS, PULMONITIS): ICD-10-CM

## 2023-08-25 PROCEDURE — 71250 CT THORAX DX C-: CPT

## 2023-08-28 RX ORDER — PAROXETINE HYDROCHLORIDE 20 MG/1
20 TABLET, FILM COATED ORAL EVERY MORNING
Qty: 90 TABLET | Refills: 3 | Status: SHIPPED | OUTPATIENT
Start: 2023-08-28

## 2023-08-29 RX ORDER — BUDESONIDE AND FORMOTEROL FUMARATE DIHYDRATE 160; 4.5 UG/1; UG/1
AEROSOL RESPIRATORY (INHALATION)
Qty: 10.2 G | Refills: 1 | Status: SHIPPED | OUTPATIENT
Start: 2023-08-29 | End: 2023-09-01

## 2023-09-01 ENCOUNTER — OFFICE VISIT (OUTPATIENT)
Dept: INTERNAL MEDICINE | Facility: CLINIC | Age: 73
End: 2023-09-01
Payer: MEDICARE

## 2023-09-01 VITALS — TEMPERATURE: 97.3 F | BODY MASS INDEX: 30.63 KG/M2 | WEIGHT: 179.4 LBS | HEIGHT: 64 IN

## 2023-09-01 DIAGNOSIS — R91.1 PULMONARY NODULE: ICD-10-CM

## 2023-09-01 DIAGNOSIS — J45.41 MODERATE PERSISTENT ASTHMA WITH ACUTE EXACERBATION: ICD-10-CM

## 2023-09-01 DIAGNOSIS — J22 LOWER RESPIRATORY INFECTION (E.G., BRONCHITIS, PNEUMONIA, PNEUMONITIS, PULMONITIS): Primary | ICD-10-CM

## 2023-09-01 PROCEDURE — 1160F RVW MEDS BY RX/DR IN RCRD: CPT

## 2023-09-01 PROCEDURE — 1159F MED LIST DOCD IN RCRD: CPT

## 2023-09-01 PROCEDURE — 99213 OFFICE O/P EST LOW 20 MIN: CPT

## 2023-09-01 RX ORDER — FOLIC ACID 1 MG/1
1 TABLET ORAL DAILY
COMMUNITY
Start: 2023-08-25

## 2023-09-01 RX ORDER — BENZONATATE 100 MG/1
100 CAPSULE ORAL 3 TIMES DAILY PRN
Qty: 30 CAPSULE | Refills: 0 | Status: SHIPPED | OUTPATIENT
Start: 2023-09-01

## 2023-09-01 RX ORDER — FLUTICASONE FUROATE, UMECLIDINIUM BROMIDE AND VILANTEROL TRIFENATATE 200; 62.5; 25 UG/1; UG/1; UG/1
1 POWDER RESPIRATORY (INHALATION) DAILY
Qty: 60 EACH | Refills: 5 | COMMUNITY
Start: 2023-09-01

## 2023-09-01 NOTE — ASSESSMENT & PLAN NOTE
CT reviewed.  Discussed with the patient.  She is feeling much better.  Her only symptom that is continuing his cough.  Continue breathing medications, DuoNebs as needed and Trelegy.  We will give her Tessalon Perles for cough.  If no improvement patient to let us know.  Also recommend that she drink plenty of fluids.  May take Mucinex as well.

## 2023-09-01 NOTE — PROGRESS NOTES
Chief Complaint  Pneumonia (3 week check up//Pt states she is feeling much better except for the cough is still present and pt feels like she is still coughing up drainage from lungs.)    History of Present Illness  SUBJECTIVE  Suzanna Santos presents to [facility] follow up on recent PNA.  She was seen and treated with zpak and Augmentin, steroids, and trelegy about 3 weeks ago. She is feeling much better; however, she is still coughing up brown sputum.     Pulm nodule-repeat in 12 months    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 but the roof line required additrional ablation   ú 3/19 and was found to have an atypical rigfht atrial flutter, an 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 and a left atrial tachycardia near the AV node   ú She developed recurrent SVT in the 130-150 range and was taken back to the lab    PONV (postoperative nausea and vomiting)     SVT (supraventricular tachycardia)     Thyroid disease     no med/cyst on thyroid      Family History   Problem Relation Age of Onset    Heart attack Mother     Heart disease Mother     Diabetes Mother     Arthritis Mother     Heart attack Father     Coronary artery disease Father     Heart disease Father     Diabetes Father     Rectal cancer Brother 30    Heart disease Brother     Diabetes Brother     Arthritis Brother     Malig Hyperthermia Neg Hx       Past  Surgical History:   Procedure Laterality Date    CARDIAC ABLATION      x4 - last 2019    CARDIAC ELECTROPHYSIOLOGY MAPPING AND ABLATION      CATARACT EXTRACTION, BILATERAL      CHOLECYSTECTOMY      COLONOSCOPY  02/08/2017    DR MANTILLA HISTORY OF COLON POLYPS    COLONOSCOPY  09/30/2021    dr mantilla    COLONOSCOPY N/A 09/30/2021    Procedure: COLONOSCOPY with BIOPSY/POLYPECTOMY COLD SNARE;  Surgeon: Tim Mantilla MD;  Location: [facility] ENDOSCOPY;  Service: Gastroenterology;  Laterality: N/A;  COLON POLYPS    EAR TUBES Bilateral 08/30/2022    Procedure: BILATERAL MYRINGOTOMY WITH INSERTION OF EAR TUBES, NASOPHARYNGOSCOPY;  Surgeon: Ag Pepe MD;  Location: [facility] MAIN OR;  Service: ENT;  Laterality: Bilateral;    ENDOSCOPY  06/18/2018    DR MANTILLA    ENDOSCOPY N/A 12/13/2022    Procedure: ESOPHAGOGASTRODUODENOSCOPY WITH BIOPSIES;  Surgeon: Tim Mantilla MD;  Location: [facility] ENDOSCOPY;  Service: Gastroenterology;  Laterality: N/A;  HIATAL HERNIA    EYE SURGERY      Macular tear repair 3/1/2021 x2    HYSTERECTOMY      JOINT REPLACEMENT Right     total hip replacement     JOINT REPLACEMENT Bilateral     TKR    PACEMAKER REPLACEMENT      SIGMOIDOSCOPY  12/13/2022    Procedure: SIGMOIDOSCOPY FLEXIBLE;  Surgeon: Tim Mantilla MD;  Location: [facility] ENDOSCOPY;  Service: Gastroenterology;;  DIVERTICULOSIS    TONSILLECTOMY      VITRECTOMY PARS PLANA W/ REPAIR OF MACULAR HOLE          Current Outpatient Medications:     albuterol sulfate  (90 Base) MCG/ACT inhaler, 2 puffs Daily As Needed., Disp: , Rfl:     atovaquone-proguanil (Malarone) 250-100 MG tablet per tablet, Take 1 tablet by mouth Daily., Disp: 25 tablet, Rfl: 0    benzonatate (Tessalon Perles) 100 MG capsule, Take 1 capsule by mouth 3 (Three) Times a Day As Needed for Cough., Disp: 30 capsule, Rfl: 0    celecoxib (CeleBREX) 200 MG capsule, TAKE 1 CAPSULE BY MOUTH DAILY., Disp: 90 capsule, Rfl: 3    cyanocobalamin (VITAMIN B-12) 500 MCG  tablet, Take 1 tablet by mouth Daily., Disp: , Rfl:     Eliquis 5 MG tablet tablet, TAKE 1 TABLET EVERY 12     HOURS, Disp: 180 tablet, Rfl: 3    famotidine (PEPCID) 20 MG tablet, Take 1 tablet by mouth Every Night., Disp: , Rfl:     Fluticasone-Umeclidin-Vilant (Trelegy Ellipta) 200-62.5-25 MCG/ACT aerosol powder , Inhale 1 puff Daily., Disp: 60 each, Rfl: 5    folic acid (FOLVITE) 1 MG tablet, Take 1 tablet by mouth Daily., Disp: , Rfl:     ipratropium-albuterol (DUO-NEB) 0.5-2.5 mg/3 ml nebulizer, Take 3 mL by nebulization Every 4 (Four) Hours As Needed for Wheezing., Disp: 360 mL, Rfl: 1    methocarbamol (Robaxin) 500 MG tablet, Take 1 tablet by mouth 3 (Three) Times a Day As Needed for Muscle Spasms., Disp: 60 tablet, Rfl: 3    methotrexate 2.5 MG tablet, Take 6 tablets by mouth 1 (One) Time Per Week., Disp: , Rfl:     metoprolol tartrate (LOPRESSOR) 25 MG tablet, TAKE 1 TABLET BY MOUTH 2 (TWO) TIMES A DAY., Disp: 180 tablet, Rfl: 3    olmesartan-hydrochlorothiazide (BENICAR HCT) 40-12.5 MG per tablet, Take 1 tablet by mouth Daily., Disp: 90 tablet, Rfl: 3    Omeprazole Magnesium (PRILOSEC OTC PO), Take 20 mg by mouth Daily., Disp: , Rfl:     PARoxetine (PAXIL) 20 MG tablet, TAKE 1 TABLET BY MOUTH EVERY MORNING., Disp: 90 tablet, Rfl: 3    pravastatin (PRAVACHOL) 10 MG tablet, Take 1 tablet by mouth Every Night., Disp: 90 tablet, Rfl: 3    traMADol (ULTRAM) 50 MG tablet, Take 1 tablet by mouth Every 6 (Six) Hours As Needed for Moderate Pain ., Disp: 90 tablet, Rfl: 2    Ascorbic Acid (BETITO-C PO), Take  by mouth Daily. (Patient not taking: Reported on 9/1/2023), Disp: , Rfl:     azithromycin (Zithromax Z-Donte) 250 MG tablet, Take 2 tablets by mouth on day 1, then 1 tablet daily on days 2-5 (Patient not taking: Reported on 9/1/2023), Disp: 6 tablet, Rfl: 0    BIOTIN PO, Take 1 tablet by mouth Daily. (Patient not taking: Reported on 9/1/2023), Disp: , Rfl:     vitamin D3 125 MCG (5000 UT) capsule capsule, Take 1  "capsule by mouth Daily. (Patient not taking: Reported on 9/1/2023), Disp: , Rfl:     Zinc Sulfate (ZINC-220 PO), Take  by mouth. (Patient not taking: Reported on 9/1/2023), Disp: , Rfl:     OBJECTIVE  Vital Signs:   Temp 97.3 øF (36.3 øC) (Temporal)   Ht 162.6 cm (64\")   Wt 81.4 kg (179 lb 6.4 oz)   BMI 30.79 kg/mý    Estimated body mass index is 30.79 kg/mý as calculated from the following:    Height as of this encounter: 162.6 cm (64\").    Weight as of this encounter: 81.4 kg (179 lb 6.4 oz).     Wt Readings from Last 3 Encounters:   09/01/23 81.4 kg (179 lb 6.4 oz)   08/11/23 82.6 kg (182 lb)   07/17/23 78.9 kg (174 lb)     BP Readings from Last 3 Encounters:   08/11/23 140/79   07/17/23 158/83   07/03/23 138/73       Physical Exam  Vitals and nursing note reviewed.   Constitutional:       Appearance: Normal appearance.   HENT:      Head: Normocephalic.   Eyes:      Extraocular Movements: Extraocular movements intact.      Conjunctiva/sclera: Conjunctivae normal.   Cardiovascular:      Rate and Rhythm: Normal rate and regular rhythm.      Heart sounds: Normal heart sounds. No murmur heard.  Pulmonary:      Effort: Pulmonary effort is normal. No respiratory distress.      Breath sounds: Normal breath sounds. No wheezing or rales.   Musculoskeletal:         General: No swelling. Normal range of motion.      Cervical back: Normal range of motion and neck supple.   Skin:     General: Skin is warm and dry.   Neurological:      General: No focal deficit present.      Mental Status: She is alert. Mental status is at baseline.   Psychiatric:         Mood and Affect: Mood normal.         Thought Content: Thought content normal.        Result Review        CT Chest Without Contrast Diagnostic    Result Date: 8/28/2023    1. Mild chronic parenchymal scarring/fibrotic change without active airspace process. 2. Small 6 mm left upper lobe nodule.  If low risk no further follow-up is recommended.  If high risk consider 1 " year follow-up chest CT to ensure stability. 3. Cardiomegaly. 4. Mildly dilated main pulmonary artery which can be seen in the setting of pulmonary arterial hypertension.     SUSIE BETTS MD       Electronically Signed and Approved By: SUSIE BETTS MD on 8/28/2023 at 10:09             XR Chest PA & Lateral    Result Date: 8/11/2023   Subtle airspace opacity in the inferior right lung may represent a focus of pneumonia     VIDHI WILSON MD       Electronically Signed and Approved By: VIDHI WILSON MD on 8/11/2023 at 11:07                The above data has been reviewed by ROSHAN Sheffield 09/01/2023 11:40 EDT.          Patient Care Team:  Joshua Lee MD as PCP - General (Internal Medicine)            ASSESSMENT & PLAN    Diagnoses and all orders for this visit:    1. Lower respiratory infection (e.g., bronchitis, pneumonia, pneumonitis, pulmonitis) (Primary)  Assessment & Plan:  CT reviewed.  Discussed with the patient.  She is feeling much better.  Her only symptom that is continuing his cough.  Continue breathing medications, DuoNebs as needed and Trelegy.  We will give her Tessalon Perles for cough.  If no improvement patient to let us know.  Also recommend that she drink plenty of fluids.  May take Mucinex as well.      2. Moderate persistent asthma with acute exacerbation  Assessment & Plan:  Continue with DuoNebs as needed.  Also we will continue her on Trelegy.          3. Pulmonary nodule  -     CT Chest Low Dose Follow Up Without Contrast; Future    Other orders  -     Fluticasone-Umeclidin-Vilant (Trelegy Ellipta) 200-62.5-25 MCG/ACT aerosol powder ; Inhale 1 puff Daily.  Dispense: 60 each; Refill: 5  -     benzonatate (Tessalon Perles) 100 MG capsule; Take 1 capsule by mouth 3 (Three) Times a Day As Needed for Cough.  Dispense: 30 capsule; Refill: 0         Tobacco Use: Low Risk     Smoking Tobacco Use: Never    Smokeless Tobacco Use: Never    Passive Exposure: Never       Follow Up      Return if symptoms worsen or fail to improve.    Please note that portions of this note were completed with a voice recognition program.    Patient was given instructions and counseling regarding her condition or for health maintenance advice. Please see specific information pulled into the AVS if appropriate.   I have reviewed information obtained and documented by others and I have confirmed the accuracy of this documented note.    ROSHAN Sheffield

## 2023-09-29 ENCOUNTER — OFFICE VISIT (OUTPATIENT)
Dept: INTERNAL MEDICINE | Facility: CLINIC | Age: 73
End: 2023-09-29
Payer: MEDICARE

## 2023-09-29 VITALS
RESPIRATION RATE: 18 BRPM | BODY MASS INDEX: 30.08 KG/M2 | OXYGEN SATURATION: 98 % | SYSTOLIC BLOOD PRESSURE: 136 MMHG | HEART RATE: 68 BPM | WEIGHT: 176.2 LBS | TEMPERATURE: 98.4 F | HEIGHT: 64 IN | DIASTOLIC BLOOD PRESSURE: 80 MMHG

## 2023-09-29 DIAGNOSIS — R05.3 CHRONIC COUGH: Primary | ICD-10-CM

## 2023-09-29 PROCEDURE — 3079F DIAST BP 80-89 MM HG: CPT

## 2023-09-29 PROCEDURE — 99213 OFFICE O/P EST LOW 20 MIN: CPT

## 2023-09-29 PROCEDURE — 1159F MED LIST DOCD IN RCRD: CPT

## 2023-09-29 PROCEDURE — 1160F RVW MEDS BY RX/DR IN RCRD: CPT

## 2023-09-29 PROCEDURE — 3075F SYST BP GE 130 - 139MM HG: CPT

## 2023-09-29 RX ORDER — BENZONATATE 100 MG/1
100 CAPSULE ORAL 3 TIMES DAILY PRN
Qty: 90 CAPSULE | Refills: 1 | Status: SHIPPED | OUTPATIENT
Start: 2023-09-29

## 2023-09-29 RX ORDER — FLUTICASONE FUROATE, UMECLIDINIUM BROMIDE AND VILANTEROL TRIFENATATE 100; 62.5; 25 UG/1; UG/1; UG/1
POWDER RESPIRATORY (INHALATION)
Qty: 60 EACH | Refills: 0 | OUTPATIENT
Start: 2023-09-29

## 2023-09-29 RX ORDER — MONTELUKAST SODIUM 10 MG/1
10 TABLET ORAL NIGHTLY
Qty: 90 TABLET | Refills: 1 | Status: SHIPPED | OUTPATIENT
Start: 2023-09-29

## 2023-09-29 RX ORDER — PREDNISONE 20 MG/1
TABLET ORAL
Qty: 11 TABLET | Refills: 0 | Status: SHIPPED | OUTPATIENT
Start: 2023-09-29

## 2023-09-29 NOTE — PROGRESS NOTES
Chief Complaint  Cough (72 year old female here today complaining of a dry cough X 2-3 weeks. States she had Pneumonia a while back and has this cough since. States she will cough so much it makes her throw up)    History of Present Illness  SUBJECTIVE  Suzanna Santos presents to [facility] follow up on cough.  Patient had pneumonia several weeks ago. She was started on trelegy and was doing much better.  She states that the cough resolved.  She reports that a couple weeks ago, she started coughing again.     Patient states that she will cough up tan sputum.    She is currently on pepcid and prilosec.    She does get short of breath exertion.      Past Medical History:   Diagnosis Date    Anemia     Arthritis     Asthma     inhaler prn    Benign essential hypertension     Cancer     skin    Cervicalgia     COVID-19 long hauler manifesting chronic fatigue 01/16/2023    Diverticulosis     Eustachian tube dysfunction, bilateral 08/17/2022    GERD (gastroesophageal reflux disease)     Glaucoma     History of degenerative disc disease     Hypertension     Neuropathy     Pacemaker     st julius     Paroxysmal atrial fibrillation 07/13/2019     ·  ablation in 2008  afib  · Repeat fib ablation '17 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 but the roof line required additrional ablation   · 3/19 and was found to have an atypical rigfht atrial flutter, an 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 and a left atrial tachycardia near the AV node   · She developed recurrent SVT in the 130-150 range and was taken back to the lab    PONV (postoperative nausea and vomiting)     SVT (supraventricular tachycardia)     Thyroid disease     no med/cyst on thyroid      Family History   Problem Relation Age of Onset    Heart attack Mother     Heart disease Mother     Diabetes Mother     Arthritis Mother     Heart attack Father     Coronary artery disease Father     Heart disease Father      Diabetes Father     Rectal cancer Brother 30    Heart disease Brother     Diabetes Brother     Arthritis Brother     Malig Hyperthermia Neg Hx       Past Surgical History:   Procedure Laterality Date    CARDIAC ABLATION      x4 - last 2019    CARDIAC ELECTROPHYSIOLOGY MAPPING AND ABLATION      CATARACT EXTRACTION, BILATERAL      CHOLECYSTECTOMY      COLONOSCOPY  02/08/2017    DR MANTILLA HISTORY OF COLON POLYPS    COLONOSCOPY  09/30/2021    dr mantilla    COLONOSCOPY N/A 09/30/2021    Procedure: COLONOSCOPY with BIOPSY/POLYPECTOMY COLD SNARE;  Surgeon: Tim Mantilla MD;  Location: [facility] ENDOSCOPY;  Service: Gastroenterology;  Laterality: N/A;  COLON POLYPS    EAR TUBES Bilateral 08/30/2022    Procedure: BILATERAL MYRINGOTOMY WITH INSERTION OF EAR TUBES, NASOPHARYNGOSCOPY;  Surgeon: Ag Pepe MD;  Location: [facility] MAIN OR;  Service: ENT;  Laterality: Bilateral;    ENDOSCOPY  06/18/2018    DR MANTILLA    ENDOSCOPY N/A 12/13/2022    Procedure: ESOPHAGOGASTRODUODENOSCOPY WITH BIOPSIES;  Surgeon: Tim Mantilla MD;  Location: [facility] ENDOSCOPY;  Service: Gastroenterology;  Laterality: N/A;  HIATAL HERNIA    EYE SURGERY      Macular tear repair 3/1/2021 x2    HYSTERECTOMY      JOINT REPLACEMENT Right     total hip replacement     JOINT REPLACEMENT Bilateral     TKR    PACEMAKER REPLACEMENT      SIGMOIDOSCOPY  12/13/2022    Procedure: SIGMOIDOSCOPY FLEXIBLE;  Surgeon: Tim Mantilla MD;  Location: [facility] ENDOSCOPY;  Service: Gastroenterology;;  DIVERTICULOSIS    TONSILLECTOMY      VITRECTOMY PARS PLANA W/ REPAIR OF MACULAR HOLE          Current Outpatient Medications:     albuterol sulfate  (90 Base) MCG/ACT inhaler, 2 puffs Daily As Needed., Disp: , Rfl:     Ascorbic Acid (BETITO-C PO), Take  by mouth Daily., Disp: , Rfl:     atovaquone-proguanil (Malarone) 250-100 MG tablet per tablet, Take 1 tablet by mouth Daily., Disp: 25 tablet, Rfl: 0    benzonatate (Tessalon Perles) 100 MG capsule, Take 1  capsule by mouth 3 (Three) Times a Day As Needed for Cough., Disp: 90 capsule, Rfl: 1    BIOTIN PO, Take 1 tablet by mouth Daily., Disp: , Rfl:     celecoxib (CeleBREX) 200 MG capsule, TAKE 1 CAPSULE BY MOUTH DAILY., Disp: 90 capsule, Rfl: 3    cyanocobalamin (VITAMIN B-12) 500 MCG tablet, Take 1 tablet by mouth Daily., Disp: , Rfl:     Eliquis 5 MG tablet tablet, TAKE 1 TABLET EVERY 12     HOURS, Disp: 180 tablet, Rfl: 3    famotidine (PEPCID) 20 MG tablet, Take 1 tablet by mouth Every Night., Disp: , Rfl:     Fluticasone-Umeclidin-Vilant (Trelegy Ellipta) 200-62.5-25 MCG/ACT aerosol powder , Inhale 1 puff Daily., Disp: 60 each, Rfl: 5    folic acid (FOLVITE) 1 MG tablet, Take 1 tablet by mouth Daily., Disp: , Rfl:     methocarbamol (Robaxin) 500 MG tablet, Take 1 tablet by mouth 3 (Three) Times a Day As Needed for Muscle Spasms., Disp: 60 tablet, Rfl: 3    methotrexate 2.5 MG tablet, Take 6 tablets by mouth 1 (One) Time Per Week., Disp: , Rfl:     metoprolol tartrate (LOPRESSOR) 25 MG tablet, TAKE 1 TABLET BY MOUTH 2 (TWO) TIMES A DAY., Disp: 180 tablet, Rfl: 3    olmesartan-hydrochlorothiazide (BENICAR HCT) 40-12.5 MG per tablet, Take 1 tablet by mouth Daily., Disp: 90 tablet, Rfl: 3    Omeprazole Magnesium (PRILOSEC OTC PO), Take 20 mg by mouth Daily., Disp: , Rfl:     PARoxetine (PAXIL) 20 MG tablet, TAKE 1 TABLET BY MOUTH EVERY MORNING., Disp: 90 tablet, Rfl: 3    pravastatin (PRAVACHOL) 10 MG tablet, Take 1 tablet by mouth Every Night., Disp: 90 tablet, Rfl: 3    traMADol (ULTRAM) 50 MG tablet, Take 1 tablet by mouth Every 6 (Six) Hours As Needed for Moderate Pain ., Disp: 90 tablet, Rfl: 2    vitamin D3 125 MCG (5000 UT) capsule capsule, Take 1 capsule by mouth Daily., Disp: , Rfl:     Zinc Sulfate (ZINC-220 PO), Take  by mouth., Disp: , Rfl:     montelukast (Singulair) 10 MG tablet, Take 1 tablet by mouth Every Night., Disp: 90 tablet, Rfl: 1    predniSONE (DELTASONE) 20 MG tablet, Take 2 tablets daily for  "3 days, then take 1 tablet daily for 3 days, then take 1/2 tablet daily for 4 days, Disp: 11 tablet, Rfl: 0    OBJECTIVE  Vital Signs:   /80 (BP Location: Left arm, Patient Position: Sitting)   Pulse 68   Temp 98.4 °F (36.9 °C) (Temporal)   Resp 18   Ht 162.6 cm (64\")   Wt 79.9 kg (176 lb 3.2 oz)   SpO2 98%   BMI 30.24 kg/m²    Estimated body mass index is 30.24 kg/m² as calculated from the following:    Height as of this encounter: 162.6 cm (64\").    Weight as of this encounter: 79.9 kg (176 lb 3.2 oz).     Wt Readings from Last 3 Encounters:   09/29/23 79.9 kg (176 lb 3.2 oz)   09/01/23 81.4 kg (179 lb 6.4 oz)   08/11/23 82.6 kg (182 lb)     BP Readings from Last 3 Encounters:   09/29/23 136/80   08/11/23 140/79   07/17/23 158/83       Physical Exam  Vitals and nursing note reviewed.   Constitutional:       Appearance: Normal appearance.   HENT:      Head: Normocephalic.   Eyes:      Extraocular Movements: Extraocular movements intact.      Conjunctiva/sclera: Conjunctivae normal.   Cardiovascular:      Rate and Rhythm: Normal rate and regular rhythm.      Heart sounds: Normal heart sounds. No murmur heard.  Pulmonary:      Effort: Pulmonary effort is normal.      Breath sounds: Normal breath sounds. No wheezing or rales.   Abdominal:      General: Bowel sounds are normal.      Palpations: Abdomen is soft.      Tenderness: There is no abdominal tenderness. There is no guarding.   Musculoskeletal:         General: No swelling. Normal range of motion.      Cervical back: Normal range of motion and neck supple.   Skin:     General: Skin is warm and dry.   Neurological:      General: No focal deficit present.      Mental Status: She is alert and oriented to person, place, and time. Mental status is at baseline.   Psychiatric:         Mood and Affect: Mood normal.         Behavior: Behavior normal.         Thought Content: Thought content normal.         Judgment: Judgment normal.        Result Review  "       CT Chest Without Contrast Diagnostic    Result Date: 8/28/2023    1. Mild chronic parenchymal scarring/fibrotic change without active airspace process. 2. Small 6 mm left upper lobe nodule.  If low risk no further follow-up is recommended.  If high risk consider 1 year follow-up chest CT to ensure stability. 3. Cardiomegaly. 4. Mildly dilated main pulmonary artery which can be seen in the setting of pulmonary arterial hypertension.     SUSIE BETTS MD       Electronically Signed and Approved By: SUSIE BETTS MD on 8/28/2023 at 10:09             XR Chest PA & Lateral    Result Date: 8/11/2023   Subtle airspace opacity in the inferior right lung may represent a focus of pneumonia     VIDHI WILSON MD       Electronically Signed and Approved By: VIDHI WILSON MD on 8/11/2023 at 11:07                The above data has been reviewed by ROSHAN Sheffield 09/29/2023 11:03 EDT.          Patient Care Team:  Joshua Lee MD as PCP - General (Internal Medicine)            ASSESSMENT & PLAN    Diagnoses and all orders for this visit:    1. Chronic cough (Primary)  Assessment & Plan:  Patient complains of a dry persistent cough that has been intermittent for the last few months.  She was recently treated for pneumonia early in August and she states that she started to feel better and the cough had improved.  A couple weeks ago, she states the cough has returned.  She states that she will occasionally have some tan sputum production but not frequent.  She states she does get short of breath with exertion or may start to wheeze with the coughing spells.  She states that the cough will become so severe that she will vomit at times.  She was unsure if she was supposed to continue the Trelegy.  I do recommend that she discontinue the Trelegy.  She will also start a daily antihistamine.  We will start Singulair as well.  Continue her Pepcid and Prilosec.  She feels like her acid reflux is under control.  She  is not on any ACE inhibitors.  She states that she is about to travel.  Her CT of the chest did show mild chronic scarring, small 6 mm left upper lobe nodule.  Recommend patient follow-up with Dr. Oakley when she returns back from her vacation.      Other orders  -     benzonatate (Tessalon Perles) 100 MG capsule; Take 1 capsule by mouth 3 (Three) Times a Day As Needed for Cough.  Dispense: 90 capsule; Refill: 1  -     predniSONE (DELTASONE) 20 MG tablet; Take 2 tablets daily for 3 days, then take 1 tablet daily for 3 days, then take 1/2 tablet daily for 4 days  Dispense: 11 tablet; Refill: 0  -     montelukast (Singulair) 10 MG tablet; Take 1 tablet by mouth Every Night.  Dispense: 90 tablet; Refill: 1         Tobacco Use: Low Risk     Smoking Tobacco Use: Never    Smokeless Tobacco Use: Never    Passive Exposure: Never       Follow Up     Return in 4 weeks (on 10/27/2023), or if symptoms worsen or fail to improve, for Recheck.    Please note that portions of this note were completed with a voice recognition program.    Patient was given instructions and counseling regarding her condition or for health maintenance advice. Please see specific information pulled into the AVS if appropriate.   I have reviewed information obtained and documented by others and I have confirmed the accuracy of this documented note.    ROSHAN Sheffield

## 2023-09-29 NOTE — ASSESSMENT & PLAN NOTE
Patient complains of a dry persistent cough that has been intermittent for the last few months.  She was recently treated for pneumonia early in August and she states that she started to feel better and the cough had improved.  A couple weeks ago, she states the cough has returned.  She states that she will occasionally have some tan sputum production but not frequent.  She states she does get short of breath with exertion or may start to wheeze with the coughing spells.  She states that the cough will become so severe that she will vomit at times.  She was unsure if she was supposed to continue the Trelegy.  I do recommend that she discontinue the Trelegy.  She will also start a daily antihistamine.  We will start Singulair as well.  Continue her Pepcid and Prilosec.  She feels like her acid reflux is under control.  She is not on any ACE inhibitors.  She states that she is about to travel.  Her CT of the chest did show mild chronic scarring, small 6 mm left upper lobe nodule.  Recommend patient follow-up with Dr. Oakley when she returns back from her vacation.

## 2023-10-16 ENCOUNTER — HOSPITAL ENCOUNTER (OUTPATIENT)
Dept: GENERAL RADIOLOGY | Facility: HOSPITAL | Age: 73
Discharge: HOME | End: 2023-10-16
Admitting: INTERNAL MEDICINE
Payer: MEDICARE

## 2023-10-16 ENCOUNTER — OFFICE VISIT (OUTPATIENT)
Dept: INTERNAL MEDICINE | Facility: CLINIC | Age: 73
End: 2023-10-16
Payer: MEDICARE

## 2023-10-16 VITALS
DIASTOLIC BLOOD PRESSURE: 89 MMHG | WEIGHT: 175.2 LBS | OXYGEN SATURATION: 98 % | HEART RATE: 75 BPM | BODY MASS INDEX: 29.91 KG/M2 | TEMPERATURE: 98 F | SYSTOLIC BLOOD PRESSURE: 160 MMHG | HEIGHT: 64 IN

## 2023-10-16 DIAGNOSIS — R05.9 COUGH IN ADULT: ICD-10-CM

## 2023-10-16 DIAGNOSIS — J45.901 MODERATE ASTHMA WITH ACUTE EXACERBATION, UNSPECIFIED WHETHER PERSISTENT: ICD-10-CM

## 2023-10-16 DIAGNOSIS — J18.9 PNEUMONIA DUE TO INFECTIOUS ORGANISM, UNSPECIFIED LATERALITY, UNSPECIFIED PART OF LUNG: Primary | ICD-10-CM

## 2023-10-16 DIAGNOSIS — J18.9 PNEUMONIA DUE TO INFECTIOUS ORGANISM, UNSPECIFIED LATERALITY, UNSPECIFIED PART OF LUNG: ICD-10-CM

## 2023-10-16 PROCEDURE — 71046 X-RAY EXAM CHEST 2 VIEWS: CPT

## 2023-10-16 PROCEDURE — 3077F SYST BP >= 140 MM HG: CPT | Performed by: INTERNAL MEDICINE

## 2023-10-16 PROCEDURE — 99213 OFFICE O/P EST LOW 20 MIN: CPT | Performed by: INTERNAL MEDICINE

## 2023-10-16 PROCEDURE — 3079F DIAST BP 80-89 MM HG: CPT | Performed by: INTERNAL MEDICINE

## 2023-10-16 RX ORDER — PREDNISONE 50 MG/1
50 TABLET ORAL DAILY
Qty: 6 TABLET | Refills: 0 | Status: SHIPPED | OUTPATIENT
Start: 2023-10-16

## 2023-10-16 RX ORDER — AZITHROMYCIN 250 MG/1
TABLET, FILM COATED ORAL
Qty: 6 TABLET | Refills: 0 | Status: SHIPPED | OUTPATIENT
Start: 2023-10-16 | End: 2023-10-21

## 2023-10-16 RX ORDER — HYDROCODONE POLISTIREX AND CHLORPHENIRAMINE POLISTIREX 10; 8 MG/5ML; MG/5ML
5 SUSPENSION, EXTENDED RELEASE ORAL EVERY 12 HOURS PRN
Qty: 100 ML | Refills: 0 | Status: SHIPPED | OUTPATIENT
Start: 2023-10-16

## 2023-10-16 RX ORDER — CEFDINIR 300 MG/1
300 CAPSULE ORAL 2 TIMES DAILY
Qty: 20 CAPSULE | Refills: 0 | Status: SHIPPED | OUTPATIENT
Start: 2023-10-16

## 2023-10-16 RX ORDER — FLUTICASONE FUROATE, UMECLIDINIUM BROMIDE AND VILANTEROL TRIFENATATE 100; 62.5; 25 UG/1; UG/1; UG/1
1 POWDER RESPIRATORY (INHALATION) DAILY
Qty: 60 EACH | Refills: 0 | OUTPATIENT
Start: 2023-10-16

## 2023-10-16 NOTE — PROGRESS NOTES
"CHIEF COMPLAINT/ HPI:  Productive cough (Pt has productive cough, colored mucus, weak, tired and she is nauseated. She has no appetite. This has been going for about a week. )    Patient's been coughing a lot since she got back from [place] got sick on her trip had to use a wheelchair to get around to the airport she feels like she cannot blackout at times when she coughs so hard,, no hemoptysis,, no fever, congested cough,          Objective   Vital Signs  Vitals:    10/16/23 1452   BP: 160/89   Pulse: 75   Temp: 98 °F (36.7 °C)   TempSrc: Skin   SpO2: 98%   Weight: 79.5 kg (175 lb 3.2 oz)   Height: 162.6 cm (64.02\")      Body mass index is 30.06 kg/m².  Review of Systems as above,  Physical Exam bilateral expiratory wheezing with congested and wheezy raspy cough, lungs are otherwise clear with inspiration cardiac exam regular rhythm, alert and oriented x3,  Result Review :   Lab Results   Component Value Date     04/30/2019     CMP          1/16/2023    10:10 7/10/2023    11:45   CMP   Glucose 99  103    BUN 17  22    Creatinine 0.94  0.88    EGFR 63.8  69.1    Sodium 140  138    Potassium 4.3  4.4    Chloride 103  103    Calcium 10.2  10.2    Total Protein 6.6  6.6    Albumin 4.1  4.4    Globulin 2.5  2.2    Total Bilirubin 0.5  0.6    Alkaline Phosphatase 68  72    AST (SGOT) 16  20    ALT (SGPT) 12  16    Albumin/Globulin Ratio 1.6  2.0    BUN/Creatinine Ratio 18.1  25.0    Anion Gap 8.5  7.0      CBC w/diff          1/16/2023    10:10 7/10/2023    11:45   CBC w/Diff   WBC 8.17  6.20    RBC 6.02  5.40    Hemoglobin 11.7  11.0    Hematocrit 39.1  34.6    MCV 65.0  64.1    MCH 19.4  20.4    MCHC 29.9  31.8    RDW 17.4  16.2    Platelets 252  201    Neutrophil Rel % 73.4     Lymphocyte Rel % 11.9     Monocyte Rel % 10.3     Eosinophil Rel % 2.4     Basophil Rel % 1.5        Lipid Panel          1/16/2023    10:10 7/10/2023    11:45   Lipid Panel   Total Cholesterol 208  206    Triglycerides 72  86    HDL " Cholesterol 50  51    VLDL Cholesterol 13  15    LDL Cholesterol  145  140    LDL/HDL Ratio 2.87  2.70       Lab Results   Component Value Date    TSH 2.320 07/10/2023    TSH 1.740 01/16/2023    TSH 2.590 08/23/2022      Lab Results   Component Value Date    FREET4 1.32 07/10/2023    FREET4 1.38 01/16/2023    FREET4 1.28 08/23/2022                          Visit Diagnoses:    ICD-10-CM ICD-9-CM   1. Pneumonia due to infectious organism, unspecified laterality, unspecified part of lung  J18.9 486   2. Cough in adult  R05.9 786.2   3. Moderate asthma with acute exacerbation, unspecified whether persistent  J45.901 493.92       Assessment and Plan   Diagnoses and all orders for this visit:    1. Pneumonia due to infectious organism, unspecified laterality, unspecified part of lung (Primary)  -     XR Chest PA & Lateral; Future  -     Hydrocod Gary-Chlorphe Gary ER (TUSSIONEX PENNKINETIC) 10-8 MG/5ML ER suspension; Take 5 mL by mouth Every 12 (Twelve) Hours As Needed for Cough.  Dispense: 100 mL; Refill: 0    2. Cough in adult  -     XR Chest PA & Lateral; Future  -     Hydrocod Gary-Chlorphe Gary ER (TUSSIONEX PENNKINETIC) 10-8 MG/5ML ER suspension; Take 5 mL by mouth Every 12 (Twelve) Hours As Needed for Cough.  Dispense: 100 mL; Refill: 0    3. Moderate asthma with acute exacerbation, unspecified whether persistent  -     XR Chest PA & Lateral; Future  -     Hydrocod Gary-Chlorphe Gary ER (TUSSIONEX PENNKINETIC) 10-8 MG/5ML ER suspension; Take 5 mL by mouth Every 12 (Twelve) Hours As Needed for Cough.  Dispense: 100 mL; Refill: 0    Other orders  -     azithromycin (Zithromax Z-Donte) 250 MG tablet; Take 2 tablets the first day, then 1 tablet daily for 4 days.  Dispense: 6 tablet; Refill: 0  -     cefdinir (OMNICEF) 300 MG capsule; Take 1 capsule by mouth 2 (Two) Times a Day.  Dispense: 20 capsule; Refill: 0  -     predniSONE (DELTASONE) 50 MG tablet; Take 1 tablet by mouth Daily.  Dispense: 6 tablet; Refill:  0        Cough, congestion wheezing, recent travel to [place], will treat for community-acquired pneumonia to start, chest x-ray ordered, will start with prednisone 50 mg daily for 6 days, Z-Donte and Omnicef have been ordered Tussionex cough syrup, patient will continue her Tessalon Perles and her nebulizer or albuterol inhaler, and we will check for COVID and flu today October 16, 2023             Follow Up   No follow-ups on file.  Patient was given instructions and counseling regarding her condition or for health maintenance advice. Please see specific information pulled into the AVS if appropriate.

## 2023-10-24 RX ORDER — OLMESARTAN MEDOXOMIL AND HYDROCHLOROTHIAZIDE 40/12.5 40; 12.5 MG/1; MG/1
1 TABLET ORAL DAILY
Qty: 90 TABLET | Refills: 3 | Status: SHIPPED | OUTPATIENT
Start: 2023-10-24

## 2023-10-31 RX ORDER — FLUTICASONE FUROATE, UMECLIDINIUM BROMIDE AND VILANTEROL TRIFENATATE 200; 62.5; 25 UG/1; UG/1; UG/1
1 POWDER RESPIRATORY (INHALATION) DAILY
Qty: 60 EACH | Refills: 5 | COMMUNITY
Start: 2023-10-31 | End: 2023-10-31 | Stop reason: SDUPTHER

## 2023-10-31 RX ORDER — FLUTICASONE FUROATE, UMECLIDINIUM BROMIDE AND VILANTEROL TRIFENATATE 200; 62.5; 25 UG/1; UG/1; UG/1
1 POWDER RESPIRATORY (INHALATION) DAILY
Qty: 60 EACH | Refills: 5 | Status: SHIPPED | OUTPATIENT
Start: 2023-10-31

## 2023-10-31 NOTE — TELEPHONE ENCOUNTER
Caller: Suzanna Santos    Relationship: Self    Best call back number: [phone]     Requested Prescriptions:   Requested Prescriptions     Pending Prescriptions Disp Refills    Fluticasone-Umeclidin-Vilant (Trelegy Ellipta) 200-62.5-25 MCG/ACT aerosol powder  60 each 5     Sig: Inhale 1 puff Daily.        Pharmacy where request should be sent: [address], [place] [address] - [phone] [facility] [phone]      Last office visit with prescribing clinician: 10/16/2023   Last telemedicine visit with prescribing clinician: Visit date not found   Next office visit with prescribing clinician: 1/17/2024     Additional details provided by patient: PATIENT WAS GETTING MEDICATION VIA SAMPLES FROM OFFICE. PATIENT WOULD LIKE PRESCRIPTION CALLED INTO PHARMACY INSTEAD. PATIENT IS COMPLETELY OUT OF MEDICATION.     Does the patient have less than a 3 day supply:  [x] Yes  [] No    Would you like a call back once the refill request has been completed: [] Yes [] No    If the office needs to give you a call back, can they leave a voicemail: [] Yes [] No    [facility] Rep   10/31/23 13:29 EDT

## 2023-11-16 ENCOUNTER — LAB (OUTPATIENT)
Dept: LAB | Facility: HOSPITAL | Age: 73
End: 2023-11-16
Payer: MEDICARE

## 2023-11-16 ENCOUNTER — TELEPHONE (OUTPATIENT)
Dept: INTERNAL MEDICINE | Facility: CLINIC | Age: 73
End: 2023-11-16
Payer: MEDICARE

## 2023-11-16 DIAGNOSIS — M10.9 GOUT OF WRIST, UNSPECIFIED CAUSE, UNSPECIFIED CHRONICITY, UNSPECIFIED LATERALITY: ICD-10-CM

## 2023-11-16 DIAGNOSIS — M10.9 GOUT OF WRIST, UNSPECIFIED CAUSE, UNSPECIFIED CHRONICITY, UNSPECIFIED LATERALITY: Primary | ICD-10-CM

## 2023-11-16 LAB — URATE SERPL-MCNC: 6.2 MG/DL (ref 2.4–5.7)

## 2023-11-16 PROCEDURE — 36415 COLL VENOUS BLD VENIPUNCTURE: CPT

## 2023-11-16 PROCEDURE — 84550 ASSAY OF BLOOD/URIC ACID: CPT

## 2023-11-16 RX ORDER — DEXAMETHASONE 4 MG/1
4 TABLET ORAL 3 TIMES DAILY
Qty: 15 TABLET | Refills: 0 | Status: SHIPPED | OUTPATIENT
Start: 2023-11-16

## 2023-11-16 NOTE — TELEPHONE ENCOUNTER
Pt states that her wrist pain started last night. Severe pain, she is taking Methotrexate. She didn't fall or a have a injury, it just started hurting. Please advise.

## 2023-11-16 NOTE — TELEPHONE ENCOUNTER
Caller: Suzanna Santos    Relationship to patient: Self    Best call back number: [phone]     Chief complaint: WRIST PAIN    Type of visit: OFFICE VISIT    Requested date: ASAP

## 2023-11-16 NOTE — TELEPHONE ENCOUNTER
Call patient tell her we will get a call when some Decadron to get rid of the pain and swelling, we need her to get a blood test to check for gout,    Put an order in for a uric acid level and a sed rate    Call in Decadron 4 mg 3 times daily #15 no refills

## 2023-11-16 NOTE — TELEPHONE ENCOUNTER
Caller: Suzanna Santos    Relationship to patient: Self    Best call back number: [phone]    Patient is needing: PATIENT WOULD LIKE TO BE CALLED ON HER CELL PHONE

## 2023-11-16 NOTE — TELEPHONE ENCOUNTER
LM for pt to call office so that I can confirm pharmacy and let her know he is sending a med in and needing labs. Pending

## 2023-11-30 ENCOUNTER — OFFICE VISIT (OUTPATIENT)
Dept: INTERNAL MEDICINE | Facility: CLINIC | Age: 73
End: 2023-11-30
Payer: MEDICARE

## 2023-11-30 VITALS
SYSTOLIC BLOOD PRESSURE: 179 MMHG | WEIGHT: 167 LBS | BODY MASS INDEX: 28.51 KG/M2 | TEMPERATURE: 98.2 F | HEART RATE: 83 BPM | DIASTOLIC BLOOD PRESSURE: 84 MMHG | OXYGEN SATURATION: 97 % | HEIGHT: 64 IN

## 2023-11-30 DIAGNOSIS — R05.1 ACUTE COUGH: Primary | ICD-10-CM

## 2023-11-30 DIAGNOSIS — J45.901 MODERATE ASTHMA WITH ACUTE EXACERBATION, UNSPECIFIED WHETHER PERSISTENT: ICD-10-CM

## 2023-11-30 DIAGNOSIS — J06.9 ACUTE URI: ICD-10-CM

## 2023-11-30 PROCEDURE — 3077F SYST BP >= 140 MM HG: CPT | Performed by: INTERNAL MEDICINE

## 2023-11-30 PROCEDURE — 99213 OFFICE O/P EST LOW 20 MIN: CPT | Performed by: INTERNAL MEDICINE

## 2023-11-30 PROCEDURE — 3079F DIAST BP 80-89 MM HG: CPT | Performed by: INTERNAL MEDICINE

## 2023-11-30 PROCEDURE — 1170F FXNL STATUS ASSESSED: CPT | Performed by: INTERNAL MEDICINE

## 2023-11-30 RX ORDER — AZITHROMYCIN 250 MG/1
TABLET, FILM COATED ORAL
Qty: 6 TABLET | Refills: 0 | Status: SHIPPED | OUTPATIENT
Start: 2023-11-30 | End: 2023-12-05

## 2023-11-30 RX ORDER — BENZONATATE 100 MG/1
100 CAPSULE ORAL 3 TIMES DAILY PRN
Qty: 30 CAPSULE | Refills: 0 | Status: SHIPPED | OUTPATIENT
Start: 2023-11-30

## 2023-11-30 RX ORDER — PREDNISONE 50 MG/1
50 TABLET ORAL DAILY
Qty: 6 TABLET | Refills: 0 | Status: SHIPPED | OUTPATIENT
Start: 2023-11-30

## 2023-11-30 RX ORDER — HYDROCODONE POLISTIREX AND CHLORPHENIRAMINE POLISTIREX 10; 8 MG/5ML; MG/5ML
5 SUSPENSION, EXTENDED RELEASE ORAL EVERY 12 HOURS PRN
Qty: 100 ML | Refills: 0 | Status: SHIPPED | OUTPATIENT
Start: 2023-11-30

## 2023-11-30 NOTE — PROGRESS NOTES
"CHIEF COMPLAINT/ HPI:  Asthma (Asthma flare up, Pt states slight cough. Pt is taking neb treatments and inhaler. )    Patient's been coughing a lot for the past couple weeks she went to acute care got a shot of steroids and some Omnicef, says it is really not helped a lot, she still coughing quite a bit, she is not running a fever, no vomiting no hemoptysis,          Objective   Vital Signs  Vitals:    11/30/23 1340   BP: 179/84   Pulse: 83   Temp: 98.2 °F (36.8 °C)   SpO2: 97%   Weight: 75.8 kg (167 lb)   Height: 162.6 cm (64.02\")      Body mass index is 28.65 kg/m².  Review of Systems, cough as above,  Physical Exam General malaise, expiratory wheezing inspiratory crackles at both bases, cardiac exam regular rhythm, coughing almost intractable at times, alert and oriented x 3  Result Review :   Lab Results   Component Value Date     04/30/2019     CMP          1/16/2023    10:10 7/10/2023    11:45   CMP   Glucose 99  103    BUN 17  22    Creatinine 0.94  0.88    EGFR 63.8  69.1    Sodium 140  138    Potassium 4.3  4.4    Chloride 103  103    Calcium 10.2  10.2    Total Protein 6.6  6.6    Albumin 4.1  4.4    Globulin 2.5  2.2    Total Bilirubin 0.5  0.6    Alkaline Phosphatase 68  72    AST (SGOT) 16  20    ALT (SGPT) 12  16    Albumin/Globulin Ratio 1.6  2.0    BUN/Creatinine Ratio 18.1  25.0    Anion Gap 8.5  7.0      CBC w/diff          1/16/2023    10:10 7/10/2023    11:45   CBC w/Diff   WBC 8.17  6.20    RBC 6.02  5.40    Hemoglobin 11.7  11.0    Hematocrit 39.1  34.6    MCV 65.0  64.1    MCH 19.4  20.4    MCHC 29.9  31.8    RDW 17.4  16.2    Platelets 252  201    Neutrophil Rel % 73.4     Lymphocyte Rel % 11.9     Monocyte Rel % 10.3     Eosinophil Rel % 2.4     Basophil Rel % 1.5        Lipid Panel          1/16/2023    10:10 7/10/2023    11:45   Lipid Panel   Total Cholesterol 208  206    Triglycerides 72  86    HDL Cholesterol 50  51    VLDL Cholesterol 13  15    LDL Cholesterol  145  140  "   LDL/HDL Ratio 2.87  2.70       Lab Results   Component Value Date    TSH 2.320 07/10/2023    TSH 1.740 01/16/2023    TSH 2.590 08/23/2022      Lab Results   Component Value Date    FREET4 1.32 07/10/2023    FREET4 1.38 01/16/2023    FREET4 1.28 08/23/2022                          Visit Diagnoses:    ICD-10-CM ICD-9-CM   1. Acute cough  R05.1 786.2   2. Acute URI  J06.9 465.9   3. Moderate asthma with acute exacerbation, unspecified whether persistent  J45.901 493.92       Assessment and Plan   Diagnoses and all orders for this visit:    1. Acute cough (Primary)  -     Hydrocod Gary-Chlorphe Gary ER (TUSSIONEX PENNKINETIC) 10-8 MG/5ML ER suspension; Take 5 mL by mouth Every 12 (Twelve) Hours As Needed for Cough.  Dispense: 100 mL; Refill: 0    2. Acute URI  -     Hydrocod Gary-Chlorphe Gary ER (TUSSIONEX PENNKINETIC) 10-8 MG/5ML ER suspension; Take 5 mL by mouth Every 12 (Twelve) Hours As Needed for Cough.  Dispense: 100 mL; Refill: 0    3. Moderate asthma with acute exacerbation, unspecified whether persistent  -     Hydrocod Gary-Chlorphe Gary ER (TUSSIONEX PENNKINETIC) 10-8 MG/5ML ER suspension; Take 5 mL by mouth Every 12 (Twelve) Hours As Needed for Cough.  Dispense: 100 mL; Refill: 0    Other orders  -     predniSONE (DELTASONE) 50 MG tablet; Take 1 tablet by mouth Daily.  Dispense: 6 tablet; Refill: 0  -     azithromycin (Zithromax Z-Donte) 250 MG tablet; Take 2 tablets the first day, then 1 tablet daily for 4 days.  Dispense: 6 tablet; Refill: 0  -     benzonatate (Tessalon Perles) 100 MG capsule; Take 1 capsule by mouth 3 (Three) Times a Day As Needed for Cough.  Dispense: 30 capsule; Refill: 0      Cough,    Asthma exacerbation    Acute upper respiratory infection, has already taken a steroid shot, Omnicef without much improvement,    Will treat with--Z-Donte, prednisone 50 mg daily for 6 days, Tussionex cough syrup, patient will continue using Delsym, cough drops,                 Follow Up   No follow-ups on  file.  Patient was given instructions and counseling regarding her condition or for health maintenance advice. Please see specific information pulled into the AVS if appropriate.

## 2024-01-08 NOTE — PROGRESS NOTES
AUDIOMETRIC EVALUATION      Name:  Suzanna Santos  :  1950  Age:  72 y.o.  Date of Evaluation:  2023       History:  Mrs. Santos is seen today for a hearing evaluation due to history of PE tubes.    Audiologic Information:  Concerns for Hearing: Yes  PETs: Yes  Other otologic surgical history: No  Aural Pressure/Fullness: Yes left ear  Otalgia: No  Otorrhea: None  Tinnitus: Periodic  Dizziness: No  Noise Exposure: No  Family history of hearing loss: No  Head trauma requiring hospital stay: No  Chemotherapy: No  Other significant history: No    **Case history obtained in office and through EMR system      EVALUATION:    See audiogram    RESULTS:    Otoscopic Evaluation:  Right: PET Visualized  Left: PET Visualized         Tympanometry (226 Hz):  Right: Type B, Large ECV - Consistent with Patent PET  Left: Type B, Normal ECV - Consistent with Clogged/Blocked PET    IMPRESSIONS:  Pure tone thresholds for the right ear shows a mild conductive hearing loss in the low frequencies.  Mild sensorineural hearing loss at 2K and 4K hertz.  Pure tone thresholds for the left ear shows a moderate rising to mild mixed hearing loss.  Patient was counseled with regard to the findings.    Amplification needs:  Patient could benefit from amplification if they feel increased communication difficulties.      RECOMMENDATIONS/PLAN:  Follow-up recommendations per Dr. Pepe.  Hearing aid evaluation and counseling upon medical clearance and patient motivation. Patient provided contact information for an audiologist in [facility].  Discussed results and recommendations with patient.           Jake Justice M.S, CCC-A  Licensed AudiologistAUDIOMETRIC EVALUATION         0

## 2024-01-16 RX ORDER — APIXABAN 5 MG/1
TABLET, FILM COATED ORAL
Qty: 60 TABLET | Refills: 0 | Status: SHIPPED | OUTPATIENT
Start: 2024-01-16

## 2024-01-17 ENCOUNTER — OFFICE VISIT (OUTPATIENT)
Dept: INTERNAL MEDICINE | Facility: CLINIC | Age: 74
End: 2024-01-17
Payer: MEDICARE

## 2024-01-17 VITALS
WEIGHT: 170 LBS | HEART RATE: 72 BPM | BODY MASS INDEX: 29.02 KG/M2 | SYSTOLIC BLOOD PRESSURE: 157 MMHG | TEMPERATURE: 98.2 F | DIASTOLIC BLOOD PRESSURE: 70 MMHG | HEIGHT: 64 IN | OXYGEN SATURATION: 97 %

## 2024-01-17 DIAGNOSIS — J45.901 MODERATE ASTHMA WITH ACUTE EXACERBATION, UNSPECIFIED WHETHER PERSISTENT: ICD-10-CM

## 2024-01-17 DIAGNOSIS — R05.1 ACUTE COUGH: ICD-10-CM

## 2024-01-17 DIAGNOSIS — J06.9 ACUTE URI: ICD-10-CM

## 2024-01-17 PROCEDURE — 3078F DIAST BP <80 MM HG: CPT | Performed by: INTERNAL MEDICINE

## 2024-01-17 PROCEDURE — 3077F SYST BP >= 140 MM HG: CPT | Performed by: INTERNAL MEDICINE

## 2024-01-17 PROCEDURE — 99213 OFFICE O/P EST LOW 20 MIN: CPT | Performed by: INTERNAL MEDICINE

## 2024-01-17 RX ORDER — MONTELUKAST SODIUM 10 MG/1
10 TABLET ORAL NIGHTLY
Qty: 30 TABLET | Refills: 5 | Status: SHIPPED | OUTPATIENT
Start: 2024-01-17

## 2024-01-17 RX ORDER — LEVOFLOXACIN 500 MG/1
500 TABLET, FILM COATED ORAL DAILY
Qty: 10 TABLET | Refills: 0 | Status: SHIPPED | OUTPATIENT
Start: 2024-01-17

## 2024-01-17 RX ORDER — HYDROCODONE POLISTIREX AND CHLORPHENIRAMINE POLISTIREX 10; 8 MG/5ML; MG/5ML
5 SUSPENSION, EXTENDED RELEASE ORAL EVERY 12 HOURS PRN
Qty: 100 ML | Refills: 0 | Status: SHIPPED | OUTPATIENT
Start: 2024-01-17

## 2024-01-17 RX ORDER — BENZONATATE 100 MG/1
100 CAPSULE ORAL 3 TIMES DAILY PRN
Qty: 90 CAPSULE | Refills: 1 | Status: SHIPPED | OUTPATIENT
Start: 2024-01-17

## 2024-01-17 RX ORDER — PREDNISONE 50 MG/1
50 TABLET ORAL DAILY
Qty: 6 TABLET | Refills: 0 | Status: SHIPPED | OUTPATIENT
Start: 2024-01-17

## 2024-01-17 NOTE — PROGRESS NOTES
"CHIEF COMPLAINT/ HPI:  Cough (Pt has had several Bronchitis issues , cough is not getting better, has been treated with abx and steroid with mild relief. While coughing hard pt states she pulled something bad in her ribs.)              Objective   Vital Signs  Vitals:    01/17/24 1110   BP: 157/70   Pulse: 72   Temp: 98.2 °F (36.8 °C)   SpO2: 97%   Weight: 77.1 kg (170 lb)   Height: 162.6 cm (64.02\")      Body mass index is 29.17 kg/m².  Review of Systems coughing, wheezing congested not feeling well for the past week or so, not responding to over-the-counter medications,  Physical Exam patient has a raspy wheezy cough, throat shows posterior cobblestoning erythema edema, no exudates cardiac exam regular rhythm, neck is supple otherwise she is alert and oriented no facial rashes,  Result Review :   Lab Results   Component Value Date     04/30/2019     CMP          7/10/2023    11:45   CMP   Glucose 103    BUN 22    Creatinine 0.88    EGFR 69.1    Sodium 138    Potassium 4.4    Chloride 103    Calcium 10.2    Total Protein 6.6    Albumin 4.4    Globulin 2.2    Total Bilirubin 0.6    Alkaline Phosphatase 72    AST (SGOT) 20    ALT (SGPT) 16    Albumin/Globulin Ratio 2.0    BUN/Creatinine Ratio 25.0    Anion Gap 7.0      CBC w/diff          7/10/2023    11:45   CBC w/Diff   WBC 6.20    RBC 5.40    Hemoglobin 11.0    Hematocrit 34.6    MCV 64.1    MCH 20.4    MCHC 31.8    RDW 16.2    Platelets 201       Lipid Panel          7/10/2023    11:45   Lipid Panel   Total Cholesterol 206    Triglycerides 86    HDL Cholesterol 51    VLDL Cholesterol 15    LDL Cholesterol  140    LDL/HDL Ratio 2.70       Lab Results   Component Value Date    TSH 2.320 07/10/2023    TSH 1.740 01/16/2023    TSH 2.590 08/23/2022      Lab Results   Component Value Date    FREET4 1.32 07/10/2023    FREET4 1.38 01/16/2023    FREET4 1.28 08/23/2022                          Visit Diagnoses:    ICD-10-CM ICD-9-CM   1. Acute cough  R05.1 786.2   2. " Acute URI  J06.9 465.9   3. Moderate asthma with acute exacerbation, unspecified whether persistent  J45.901 493.92       Assessment and Plan   Diagnoses and all orders for this visit:    1. Acute cough  -     Hydrocod Gary-Chlorphe Gary ER (TUSSIONEX PENNKINETIC) 10-8 MG/5ML ER suspension; Take 5 mL by mouth Every 12 (Twelve) Hours As Needed for Cough.  Dispense: 100 mL; Refill: 0    2. Acute URI  -     Hydrocod Gary-Chlorphe Gary ER (TUSSIONEX PENNKINETIC) 10-8 MG/5ML ER suspension; Take 5 mL by mouth Every 12 (Twelve) Hours As Needed for Cough.  Dispense: 100 mL; Refill: 0    3. Moderate asthma with acute exacerbation, unspecified whether persistent  -     Hydrocod Gary-Chlorphe Gary ER (TUSSIONEX PENNKINETIC) 10-8 MG/5ML ER suspension; Take 5 mL by mouth Every 12 (Twelve) Hours As Needed for Cough.  Dispense: 100 mL; Refill: 0    Other orders  -     benzonatate (Tessalon Perles) 100 MG capsule; Take 1 capsule by mouth 3 (Three) Times a Day As Needed for Cough.  Dispense: 90 capsule; Refill: 1  -     predniSONE (DELTASONE) 50 MG tablet; Take 1 tablet by mouth Daily.  Dispense: 6 tablet; Refill: 0  -     montelukast (Singulair) 10 MG tablet; Take 1 tablet by mouth Every Night.  Dispense: 30 tablet; Refill: 5  -     levoFLOXacin (Levaquin) 500 MG tablet; Take 1 tablet by mouth Daily.  Dispense: 10 tablet; Refill: 0                       Follow Up   No follow-ups on file.  Patient was given instructions and counseling regarding her condition or for health maintenance advice. Please see specific information pulled into the AVS if appropriate.

## 2024-01-24 ENCOUNTER — CLINICAL SUPPORT NO REQUIREMENTS (OUTPATIENT)
Dept: CARDIOLOGY | Facility: CLINIC | Age: 74
End: 2024-01-24
Payer: MEDICARE

## 2024-01-24 ENCOUNTER — OFFICE VISIT (OUTPATIENT)
Dept: CARDIOLOGY | Facility: CLINIC | Age: 74
End: 2024-01-24
Payer: MEDICARE

## 2024-01-24 VITALS
HEIGHT: 64 IN | DIASTOLIC BLOOD PRESSURE: 63 MMHG | BODY MASS INDEX: 28 KG/M2 | HEART RATE: 62 BPM | SYSTOLIC BLOOD PRESSURE: 132 MMHG | WEIGHT: 164 LBS

## 2024-01-24 DIAGNOSIS — Z95.0 PRESENCE OF CARDIAC PACEMAKER: ICD-10-CM

## 2024-01-24 DIAGNOSIS — I49.5 SSS (SICK SINUS SYNDROME): Primary | ICD-10-CM

## 2024-01-24 DIAGNOSIS — I48.0 PAF (PAROXYSMAL ATRIAL FIBRILLATION): Primary | ICD-10-CM

## 2024-01-24 DIAGNOSIS — E78.2 MIXED HYPERLIPIDEMIA: ICD-10-CM

## 2024-01-24 DIAGNOSIS — I10 ESSENTIAL HYPERTENSION: ICD-10-CM

## 2024-01-24 PROCEDURE — 99214 OFFICE O/P EST MOD 30 MIN: CPT | Performed by: INTERNAL MEDICINE

## 2024-01-24 PROCEDURE — 3078F DIAST BP <80 MM HG: CPT | Performed by: INTERNAL MEDICINE

## 2024-01-24 PROCEDURE — 3075F SYST BP GE 130 - 139MM HG: CPT | Performed by: INTERNAL MEDICINE

## 2024-01-24 NOTE — ASSESSMENT & PLAN NOTE
Pacemaker working normally the right ventricular voltages were decreased provide battery life repeat in office check 1 year continue remote monitoring

## 2024-01-24 NOTE — PROGRESS NOTES
Chief Complaint  Follow-up, Pacemaker Check, Atrial Fibrillation, Hypertension, and Hyperlipidemia    Subjective    Patient has been doing well symptomatically she reports an occasional irregular heartbeat and rhythm but symptoms have been well-controlled in general.  She denies any change in her overall breathing ability has not experienced any chest discomfort problems    Past Medical History:   Diagnosis Date    Abnormal ECG     Anemia     Arrhythmia     Arthritis     Asthma     inhaler prn    Benign essential hypertension     Cancer     skin    Cervicalgia     Coronary artery disease     COVID-19 long hauler manifesting chronic fatigue 01/16/2023    Diverticulosis     Eustachian tube dysfunction, bilateral 08/17/2022    GERD (gastroesophageal reflux disease)     Glaucoma     Heart valve disease     History of degenerative disc disease     Hypertension     Neuropathy     Pacemaker     st julius     Paroxysmal atrial fibrillation 07/13/2019     ·  ablation in 2008  afib  · Repeat fib ablation '17 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 but the roof line required additrional ablation   · 3/19 and was found to have an atypical rigfht atrial flutter, an 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 MUST KEEP APPT FOR ANY ADDITIONAL REFILLS, Disp: 60 tablet, Rfl: 0    famotidine (PEPCID) 20 MG tablet, Take 1 tablet by mouth Every Night., Disp: , Rfl:     Fluticasone-Umeclidin-Vilant (Trelegy Ellipta) 200-62.5-25 MCG/ACT aerosol powder ,  Inhale 1 puff Daily., Disp: 60 each, Rfl: 5    folic acid (FOLVITE) 1 MG tablet, Take 1 tablet by mouth Daily., Disp: , Rfl:     levoFLOXacin (Levaquin) 500 MG tablet, Take 1 tablet by mouth Daily., Disp: 10 tablet, Rfl: 0    methocarbamol (Robaxin) 500 MG tablet, Take 1 tablet by mouth 3 (Three) Times a Day As Needed for Muscle Spasms., Disp: 60 tablet, Rfl: 3    metoprolol tartrate (LOPRESSOR) 25 MG tablet, TAKE 1 TABLET BY MOUTH 2 (TWO) TIMES A DAY., Disp: 180 tablet, Rfl: 3    montelukast (Singulair) 10 MG tablet, Take 1 tablet by mouth Every Night., Disp: 30 tablet, Rfl: 5    olmesartan-hydrochlorothiazide (BENICAR HCT) 40-12.5 MG per tablet, TAKE 1 TABLET BY MOUTH DAILY., Disp: 90 tablet, Rfl: 3    Omeprazole Magnesium (PRILOSEC OTC PO), Take 20 mg by mouth Daily., Disp: , Rfl:     PARoxetine (PAXIL) 20 MG tablet, TAKE 1 TABLET BY MOUTH EVERY MORNING., Disp: 90 tablet, Rfl: 3    pravastatin (PRAVACHOL) 10 MG tablet, Take 1 tablet by mouth Every Night., Disp: 90 tablet, Rfl: 3    traMADol (ULTRAM) 50 MG tablet, Take 1 tablet by mouth Every 6 (Six) Hours As Needed for Moderate Pain ., Disp: 90 tablet, Rfl: 2    Medications Discontinued During This Encounter   Medication Reason    benzonatate (Tessalon Perles) 100 MG capsule *Therapy completed    benzonatate (Tessalon Perles) 100 MG capsule *Therapy completed    Hydrocod Gary-Chlorphe Gary ER (TUSSIONEX PENNKINETIC) 10-8 MG/5ML ER suspension *Therapy completed    predniSONE (DELTASONE) 50 MG tablet *Therapy completed     Allergies   Allergen Reactions    Doxycycline Hives     .    Lariam [Mefloquine] Hives     .    Lisinopril Hives     .    Talwin [Pentazocine] Hallucinations        Social History     Tobacco Use    Smoking status: Never     Passive exposure: Never    Smokeless tobacco: Never    Tobacco comments:     Grew up in smoking environm.   Vaping Use    Vaping Use: Never used   Substance Use Topics    Alcohol use: No    Drug use: Never       Family  "History   Problem Relation Age of Onset    Heart attack Mother     Heart disease Mother     Diabetes Mother     Arthritis Mother     Arrhythmia Mother     Heart attack Father     Coronary artery disease Father     Heart disease Father     Diabetes Father     Rectal cancer Brother 30    Heart disease Brother     Diabetes Brother     Arthritis Brother     Malig Hyperthermia Neg Hx         Objective     /63   Pulse 62   Ht 162.6 cm (64.02\")   Wt 74.4 kg (164 lb)   BMI 28.13 kg/m²       Physical Exam    General Appearance:   no acute distress  Alert and oriented x3  HENT:   lips not cyanotic  Atraumatic  Neck:  No jvd   supple  Respiratory:  no respiratory distress  normal breath sounds  no rales  Cardiovascular:  Regular rate and rhythm  no S3, no S4   no murmur  no rub  Extremities  No cyanosis  lower extremity edema: none    Skin:   warm, dry  No rashes      Result Review :     No results found for: \"PROBNP\"  CMP          7/10/2023    11:45   CMP   Glucose 103    BUN 22    Creatinine 0.88    EGFR 69.1    Sodium 138    Potassium 4.4    Chloride 103    Calcium 10.2    Total Protein 6.6    Albumin 4.4    Globulin 2.2    Total Bilirubin 0.6    Alkaline Phosphatase 72    AST (SGOT) 20    ALT (SGPT) 16    Albumin/Globulin Ratio 2.0    BUN/Creatinine Ratio 25.0    Anion Gap 7.0      CBC w/diff          7/10/2023    11:45   CBC w/Diff   WBC 6.20    RBC 5.40    Hemoglobin 11.0    Hematocrit 34.6    MCV 64.1    MCH 20.4    MCHC 31.8    RDW 16.2    Platelets 201       Lab Results   Component Value Date    TSH 2.320 07/10/2023      Lab Results   Component Value Date    FREET4 1.32 07/10/2023      No results found for: \"DDIMERQUANT\"  No results found for: \"MG\"   No results found for: \"DIGOXIN\"   No results found for: \"TROPONINT\"        Lipid Panel          7/10/2023    11:45   Lipid Panel   Total Cholesterol 206    Triglycerides 86    HDL Cholesterol 51    VLDL Cholesterol 15    LDL Cholesterol  140    LDL/HDL Ratio " "2.70      No results found for: \"POCTROP\"  Dual-chamber pacemaker interrogation right atrial lead pacing Amsidine working normally.  Right atrial lead paced 14% time working normally.  Patient had right ventricular voltage decreased to 2.0 V to provide left                 Diagnoses and all orders for this visit:    1. PAF (paroxysmal atrial fibrillation) (Primary)  Assessment & Plan:  Patient with controlled heart rate primarily maintaining normal sinus rhythm she is on Eliquis 5 twice daily for CVA prevention      2. Presence of cardiac pacemaker  Assessment & Plan:  Pacemaker working normally the right ventricular voltages were decreased provide battery life repeat in office check 1 year continue remote monitoring      3. Mixed hyperlipidemia    4. Essential hypertension  Assessment & Plan:  Blood pressure well-controlled on Benicar HCT 40/12.5 mg              Follow Up     Return in about 6 months (around 7/24/2024) for Follow with Margarita Perez.          Patient was given instructions and counseling regarding her condition or for health maintenance advice. Please see specific information pulled into the AVS if appropriate.       "

## 2024-01-24 NOTE — ASSESSMENT & PLAN NOTE
Patient with controlled heart rate primarily maintaining normal sinus rhythm she is on Eliquis 5 twice daily for CVA prevention

## 2024-01-29 ENCOUNTER — TELEPHONE (OUTPATIENT)
Dept: INTERNAL MEDICINE | Facility: CLINIC | Age: 74
End: 2024-01-29

## 2024-01-29 NOTE — TELEPHONE ENCOUNTER
"Caller: Suzanna Santos \"Pat\"    Relationship: Self    Best call back number: [phone]     What orders are you requesting (i.e. lab or imaging): XRAY OF UPPER BACK DUE TO UPPER BACK PAIN    In what timeframe would the patient need to come in: ASAP    Where will you receive your lab/imaging services: [facility]"

## 2024-01-30 ENCOUNTER — TELEMEDICINE (OUTPATIENT)
Dept: INTERNAL MEDICINE | Facility: CLINIC | Age: 74
End: 2024-01-30
Payer: MEDICARE

## 2024-01-30 DIAGNOSIS — R05.9 COUGH IN ADULT: ICD-10-CM

## 2024-01-30 DIAGNOSIS — M54.6 ACUTE BILATERAL THORACIC BACK PAIN: Primary | ICD-10-CM

## 2024-01-30 PROCEDURE — 99213 OFFICE O/P EST LOW 20 MIN: CPT | Performed by: INTERNAL MEDICINE

## 2024-01-30 RX ORDER — DEXAMETHASONE 4 MG/1
4 TABLET ORAL 3 TIMES DAILY
Qty: 15 TABLET | Refills: 0 | Status: SHIPPED | OUTPATIENT
Start: 2024-01-30

## 2024-01-30 NOTE — PROGRESS NOTES
Patient is here for telehealth visit, consents to visit, patient is being seen by audio and video, patient is being seen at home and I am performing the visit at my office.    CHIEF COMPLAINT/ HPI:  bronchitis in thanksgiving time and rough coughing spell in early dec.  But now still hurts in upper , mid back   No rash     No chief complaint on file.              Objective   Vital Signs  There were no vitals filed for this visit.   There is no height or weight on file to calculate BMI.  Review of Systems cough, starts hurting after getting up , better lying   Back pain upper, no fever    Physical Exam alert and o x 3   Upper back pain with movements of neck , shoulders   Hurts to sneeze     Result Review :   Lab Results   Component Value Date     04/30/2019     CMP          7/10/2023    11:45   CMP   Glucose 103    BUN 22    Creatinine 0.88    EGFR 69.1    Sodium 138    Potassium 4.4    Chloride 103    Calcium 10.2    Total Protein 6.6    Albumin 4.4    Globulin 2.2    Total Bilirubin 0.6    Alkaline Phosphatase 72    AST (SGOT) 20    ALT (SGPT) 16    Albumin/Globulin Ratio 2.0    BUN/Creatinine Ratio 25.0    Anion Gap 7.0      CBC w/diff          7/10/2023    11:45   CBC w/Diff   WBC 6.20    RBC 5.40    Hemoglobin 11.0    Hematocrit 34.6    MCV 64.1    MCH 20.4    MCHC 31.8    RDW 16.2    Platelets 201       Lipid Panel          7/10/2023    11:45   Lipid Panel   Total Cholesterol 206    Triglycerides 86    HDL Cholesterol 51    VLDL Cholesterol 15    LDL Cholesterol  140    LDL/HDL Ratio 2.70       Lab Results   Component Value Date    TSH 2.320 07/10/2023    TSH 1.740 01/16/2023    TSH 2.590 08/23/2022      Lab Results   Component Value Date    FREET4 1.32 07/10/2023    FREET4 1.38 01/16/2023    FREET4 1.28 08/23/2022                          Visit Diagnoses:    ICD-10-CM ICD-9-CM   1. Acute bilateral thoracic back pain  M54.6 724.1   2. Cough in adult  R05.9 786.2       Assessment and Plan   Diagnoses and  all orders for this visit:    1. Acute bilateral thoracic back pain (Primary)  -     XR Spine Thoracic 3 View; Future  -     XR Chest PA & Lateral; Future    2. Cough in adult  -     XR Spine Thoracic 3 View; Future  -     XR Chest PA & Lateral; Future    Other orders  -     dexAMETHasone (DECADRON) 4 MG tablet; Take 1 tablet by mouth 3 (Three) Times a Day.  Dispense: 15 tablet; Refill: 0        Upper mid back pain, been going on 6 to 8 weeks, worse with standing movement turning twisting of the neck or back area, better if lying down, etiology is unclear but she had a severe episode of coughing back 2 months ago with an upper respiratory infection that started all this, plan is to get an x-ray of her thoracic spine and cervical spine to rule out compression fracture, treatment options to include------ patient's been using tramadol and Tylenol and heat, she is on anticoagulation with Eliquis, my plan would be to x-ray as above, add Decadron for 5 days, in addition to her Tylenol and tramadol, continue using Robaxin 2 times a day,             Follow Up   No follow-ups on file.  Patient was given instructions and counseling regarding her condition or for health maintenance advice. Please see specific information pulled into the AVS if appropriate.

## 2024-01-31 ENCOUNTER — HOSPITAL ENCOUNTER (OUTPATIENT)
Dept: GENERAL RADIOLOGY | Facility: HOSPITAL | Age: 74
Discharge: HOME | End: 2024-01-31
Payer: MEDICARE

## 2024-01-31 DIAGNOSIS — M54.6 ACUTE BILATERAL THORACIC BACK PAIN: ICD-10-CM

## 2024-01-31 DIAGNOSIS — R05.9 COUGH IN ADULT: ICD-10-CM

## 2024-01-31 PROCEDURE — 71046 X-RAY EXAM CHEST 2 VIEWS: CPT

## 2024-01-31 PROCEDURE — 72072 X-RAY EXAM THORAC SPINE 3VWS: CPT

## 2024-02-16 ENCOUNTER — TELEPHONE (OUTPATIENT)
Dept: CARDIOLOGY | Facility: CLINIC | Age: 74
End: 2024-02-16
Payer: MEDICARE

## 2024-03-08 ENCOUNTER — CLINICAL SUPPORT (OUTPATIENT)
Dept: GASTROENTEROLOGY | Facility: CLINIC | Age: 74
End: 2024-03-08
Payer: MEDICARE

## 2024-03-08 ENCOUNTER — PREP FOR SURGERY (OUTPATIENT)
Dept: OTHER | Facility: HOSPITAL | Age: 74
End: 2024-03-08
Payer: MEDICARE

## 2024-03-08 DIAGNOSIS — Z12.11 ENCOUNTER FOR SCREENING FOR MALIGNANT NEOPLASM OF COLON: Primary | ICD-10-CM

## 2024-03-08 RX ORDER — POLYETHYLENE GLYCOL 3350, SODIUM SULFATE ANHYDROUS, SODIUM BICARBONATE, SODIUM CHLORIDE, POTASSIUM CHLORIDE 236; 22.74; 6.74; 5.86; 2.97 G/4L; G/4L; G/4L; G/4L; G/4L
4 POWDER, FOR SOLUTION ORAL ONCE
Qty: 4000 ML | Refills: 0 | Status: SHIPPED | OUTPATIENT
Start: 2024-03-08 | End: 2024-03-08

## 2024-03-08 NOTE — PROGRESS NOTES
Suzanna Santos  1950  73 y.o.    Reason for call: Recall  Prep prescribed: Jitendra  Prep instructions reviewed with patient and sent to patient via regular mail to the home address on file  Is the patient currently on any injectable medications for weight loss or diabetes? No  Clearance needed? Yes  If yes, what clearance is needed? Blood thinner and Cardiology   Clearance has been requested from   The patient has been scheduled for: Colonoscopy  After your procedure, you will be contacted with results. Please confirm the best phone # to reach the patient: [phone]  Family history of colon cancer? Yes  If yes, indicate relative: Brother  Family History   Problem Relation Age of Onset    Heart attack Mother     Heart disease Mother     Diabetes Mother     Arthritis Mother     Arrhythmia Mother     Heart attack Father     Coronary artery disease Father     Heart disease Father     Diabetes Father     Colon cancer Brother     Rectal cancer Brother 30    Heart disease Brother     Diabetes Brother     Arthritis Brother     Malig Hyperthermia Neg Hx      Past Medical History:   Diagnosis Date    Abnormal ECG     Anemia     Arrhythmia     Arthritis     Asthma     inhaler prn    Benign essential hypertension     Cancer     skin    Cervicalgia     Coronary artery disease     COVID-19 long hauler manifesting chronic fatigue 01/16/2023    Diverticulosis     Eustachian tube dysfunction, bilateral 08/17/2022    GERD (gastroesophageal reflux disease)     Glaucoma     Heart valve disease     History of degenerative disc disease     Hypertension     Neuropathy     Pacemaker     st julius     Paroxysmal atrial fibrillation 07/13/2019     ·  ablation in 2008  afib  · Repeat fib ablation '17 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 but the roof line required additrional ablation   · 3/19 and was found to have an atypical rigfht atrial flutter, an 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 and a left  atrial tachycardia near the AV node   · She developed recurrent SVT in the 130-150 range and was taken back to the lab    PONV (postoperative nausea and vomiting)     SVT (supraventricular tachycardia)     Thyroid disease     no med/cyst on thyroid     Allergies   Allergen Reactions    Doxycycline Hives     .    Lariam [Mefloquine] Hives     .    Lisinopril Hives     .    Talwin [Pentazocine] Hallucinations     Past Surgical History:   Procedure Laterality Date    CARDIAC ABLATION      x4 - last 2019    CARDIAC CATHETERIZATION      CARDIAC ELECTROPHYSIOLOGY MAPPING AND ABLATION      CATARACT EXTRACTION, BILATERAL      CHOLECYSTECTOMY      COLONOSCOPY  02/08/2017    DR MANTILLA HISTORY OF COLON POLYPS    COLONOSCOPY  09/30/2021    dr mantilla    COLONOSCOPY N/A 09/30/2021    Procedure: COLONOSCOPY with BIOPSY/POLYPECTOMY COLD SNARE;  Surgeon: Tim Mantilla MD;  Location: [facility] ENDOSCOPY;  Service: Gastroenterology;  Laterality: N/A;  COLON POLYPS    EAR TUBES Bilateral 08/30/2022    Procedure: BILATERAL MYRINGOTOMY WITH INSERTION OF EAR TUBES, NASOPHARYNGOSCOPY;  Surgeon: Ag Pepe MD;  Location: [facility] MAIN OR;  Service: ENT;  Laterality: Bilateral;    ENDOSCOPY  06/18/2018    DR MANTILLA    ENDOSCOPY N/A 12/13/2022    Procedure: ESOPHAGOGASTRODUODENOSCOPY WITH BIOPSIES;  Surgeon: Tim Mantilla MD;  Location: [facility] ENDOSCOPY;  Service: Gastroenterology;  Laterality: N/A;  HIATAL HERNIA    EYE SURGERY      Macular tear repair 3/1/2021 x2    HYSTERECTOMY      INSERT / REPLACE / REMOVE PACEMAKER      JOINT REPLACEMENT Right     total hip replacement     JOINT REPLACEMENT Bilateral     TKR    PACEMAKER REPLACEMENT      SIGMOIDOSCOPY  12/13/2022    Procedure: SIGMOIDOSCOPY FLEXIBLE;  Surgeon: Tim Mantilla MD;  Location: [facility] ENDOSCOPY;  Service: Gastroenterology;;  DIVERTICULOSIS    TONSILLECTOMY      VITRECTOMY PARS PLANA W/ REPAIR OF MACULAR HOLE       Social History     Socioeconomic History     Marital status:    Tobacco Use    Smoking status: Never     Passive exposure: Never    Smokeless tobacco: Never    Tobacco comments:     Grew up in smoking environm.   Vaping Use    Vaping status: Never Used   Substance and Sexual Activity    Alcohol use: No    Drug use: Never    Sexual activity: Yes     Partners: Male     Comment: age       Current Outpatient Medications:     albuterol sulfate  (90 Base) MCG/ACT inhaler, 2 puffs Daily As Needed., Disp: , Rfl:     apixaban (Eliquis) 5 MG tablet tablet, Take 1 tablet by mouth Every 12 (Twelve) Hours., Disp: 180 tablet, Rfl: 3    dexAMETHasone (DECADRON) 4 MG tablet, Take 1 tablet by mouth 3 (Three) Times a Day., Disp: 15 tablet, Rfl: 0    famotidine (PEPCID) 20 MG tablet, Take 1 tablet by mouth Every Night., Disp: , Rfl:     Fluticasone-Umeclidin-Vilant (Trelegy Ellipta) 200-62.5-25 MCG/ACT aerosol powder , Inhale 1 puff Daily., Disp: 60 each, Rfl: 5    folic acid (FOLVITE) 1 MG tablet, Take 1 tablet by mouth Daily., Disp: , Rfl:     metoprolol tartrate (LOPRESSOR) 25 MG tablet, TAKE 1 TABLET BY MOUTH 2 (TWO) TIMES A DAY., Disp: 180 tablet, Rfl: 3    montelukast (Singulair) 10 MG tablet, Take 1 tablet by mouth Every Night., Disp: 30 tablet, Rfl: 5    olmesartan-hydrochlorothiazide (BENICAR HCT) 40-12.5 MG per tablet, TAKE 1 TABLET BY MOUTH DAILY., Disp: 90 tablet, Rfl: 3    Omeprazole Magnesium (PRILOSEC OTC PO), Take 20 mg by mouth Daily., Disp: , Rfl:     PARoxetine (PAXIL) 20 MG tablet, TAKE 1 TABLET BY MOUTH EVERY MORNING., Disp: 90 tablet, Rfl: 3    pravastatin (PRAVACHOL) 10 MG tablet, Take 1 tablet by mouth Every Night., Disp: 90 tablet, Rfl: 3    traMADol (ULTRAM) 50 MG tablet, Take 1 tablet by mouth Every 6 (Six) Hours As Needed for Moderate Pain ., Disp: 90 tablet, Rfl: 2    methocarbamol (Robaxin) 500 MG tablet, Take 1 tablet by mouth 3 (Three) Times a Day As Needed for Muscle Spasms. (Patient not taking: Reported on 3/8/2024), Disp: 60  tablet, Rfl: 3

## 2024-03-11 ENCOUNTER — TELEPHONE (OUTPATIENT)
Dept: GASTROENTEROLOGY | Facility: CLINIC | Age: 74
End: 2024-03-11
Payer: MEDICARE

## 2024-03-11 NOTE — TELEPHONE ENCOUNTER
"  Hub staff attempted to follow warm transfer process and was unsuccessful     Caller: Suzanna Santos \"Pat\"    Relationship to patient: Self    Best call back number: [phone]  CAN CALL LATE MORNING    Patient is needing: PATIENT NEEDING TO RESCHEDULE SCOPE SCHEDULED WITH DR. MARIE ON 5-, PATIENT REQUESTING SOONEST AVAILABLE, PATIENT STATED THAT THERE WAS ONE AVAILABLE IN [place] THE LAST TIME SHE SPOKE WITH SOMEONE, SHE WANTED TO SEE IF THAT WAS STILL AVAILABLE.    "

## 2024-03-12 NOTE — TELEPHONE ENCOUNTER
Called patient back and she thought if she cancelled her appt we could give her something sooner. I explained to her why we had to do may. Its the soonest  has open, and the earliest morning appt. She stated she would keep her 05/28/2024 7:00 AM arrival time.

## 2024-04-09 DIAGNOSIS — I48.91 ATRIAL FIBRILLATION, UNSPECIFIED TYPE: ICD-10-CM

## 2024-05-21 NOTE — PRE-PROCEDURE INSTRUCTIONS
"Instructed on date and arrival time of 0700. Come to entrance \"C\". Must have  over age 18 to drive home.  May have two visitors; however, children under 12 must stay in waiting room.  Discussed clear liquid diet (no red or purple), bowel prep, and NPO.  May take medications as usual except for blood thinners, diabetic medications, and weight loss medications.  Verbalized understanding of instructions given.  Instructed to call for questions or concerns.  Hold Eliquis for 2 days prior to procedure.  Cardiac and blood thinner clearances noted in chart.  "

## 2024-05-23 ENCOUNTER — ANESTHESIA EVENT (OUTPATIENT)
Dept: GASTROENTEROLOGY | Facility: HOSPITAL | Age: 74
End: 2024-05-23
Payer: MEDICARE

## 2024-05-23 NOTE — ANESTHESIA PREPROCEDURE EVALUATION
Anesthesia Evaluation     history of anesthetic complications (Ordered Zofran per request):  PONV  NPO Solid Status: > 8 hours  NPO Liquid Status: > 2 hours           Airway   Mallampati: I  TM distance: >3 FB  Neck ROM: full  Dental          Pulmonary - normal exam   (+) asthma (Will use albuterol inhaler prior to procedure),recent URI  Cardiovascular   Exercise tolerance: good (4-7 METS)    ECG reviewed  PT is on anticoagulation therapy  Rhythm: regular  Rate: normal    (+) pacemaker pacemaker, hypertension, valvular problems/murmurs murmur, CAD, dysrhythmias (4 ablations, on Eliquis), hyperlipidemia    PE comment: H/o SSS and afib, no issues since ppm. Currently SR w 1st AVB, occasionally paced. Took Metoprolol last night.    Neuro/Psych  (+) psychiatric history Anxiety and Depression  GI/Hepatic/Renal/Endo    (+) GERD well controlled, thyroid problem thyroid nodules    Musculoskeletal     (+) neck pain, neck stiffness  Abdominal    Substance History      OB/GYN          Other   arthritis,   history of cancer    ROS/Med Hx Other: EKG 08/30/22: HR 69,   Sinus rhythm  Prolonged NH interval  Minimal ST depression, anterolateral leads    Cardiac clearance letter received from Dr. Shook on 05/08/24 with moderate risks     Last dose Eliquis ?                   Anesthesia Plan    ASA 4     general   total IV anesthesia  (Total IV Anesthesia    Patient understands anesthesia not responsible for dental damage.  )  intravenous induction     Anesthetic plan, risks, benefits, and alternatives have been provided, discussed and informed consent has been obtained with: patient.    Plan discussed with CRNA.      CODE STATUS:

## 2024-05-28 ENCOUNTER — HOSPITAL ENCOUNTER (OUTPATIENT)
Facility: HOSPITAL | Age: 74
Setting detail: HOSPITAL OUTPATIENT SURGERY
Discharge: HOME | End: 2024-05-28
Attending: INTERNAL MEDICINE | Admitting: INTERNAL MEDICINE
Payer: MEDICARE

## 2024-05-28 ENCOUNTER — ANESTHESIA (OUTPATIENT)
Dept: GASTROENTEROLOGY | Facility: HOSPITAL | Age: 74
End: 2024-05-28
Payer: MEDICARE

## 2024-05-28 VITALS
WEIGHT: 173.28 LBS | BODY MASS INDEX: 29.73 KG/M2 | OXYGEN SATURATION: 100 % | SYSTOLIC BLOOD PRESSURE: 157 MMHG | DIASTOLIC BLOOD PRESSURE: 75 MMHG | RESPIRATION RATE: 17 BRPM | HEART RATE: 61 BPM | TEMPERATURE: 97.4 F

## 2024-05-28 PROCEDURE — 25010000002 ONDANSETRON PER 1 MG: Performed by: NURSE ANESTHETIST, CERTIFIED REGISTERED

## 2024-05-28 PROCEDURE — 25810000003 LACTATED RINGERS PER 1000 ML

## 2024-05-28 PROCEDURE — 25010000002 PROPOFOL 10 MG/ML EMULSION: Performed by: NURSE ANESTHETIST, CERTIFIED REGISTERED

## 2024-05-28 PROCEDURE — 45378 DIAGNOSTIC COLONOSCOPY: CPT | Performed by: INTERNAL MEDICINE

## 2024-05-28 PROCEDURE — 25010000002 GLUCAGON (RDNA) PER 1 MG: Performed by: NURSE ANESTHETIST, CERTIFIED REGISTERED

## 2024-05-28 RX ORDER — SODIUM CHLORIDE, SODIUM LACTATE, POTASSIUM CHLORIDE, CALCIUM CHLORIDE 600; 310; 30; 20 MG/100ML; MG/100ML; MG/100ML; MG/100ML
30 INJECTION, SOLUTION INTRAVENOUS CONTINUOUS
Status: DISCONTINUED | OUTPATIENT
Start: 2024-05-28 | End: 2024-05-28 | Stop reason: HOSPADM

## 2024-05-28 RX ORDER — IBUPROFEN 600 MG/1
TABLET ORAL AS NEEDED
Status: DISCONTINUED | OUTPATIENT
Start: 2024-05-28 | End: 2024-05-28 | Stop reason: SURG

## 2024-05-28 RX ORDER — PHENYLEPHRINE HCL IN 0.9% NACL 1 MG/10 ML
SYRINGE (ML) INTRAVENOUS AS NEEDED
Status: DISCONTINUED | OUTPATIENT
Start: 2024-05-28 | End: 2024-05-28 | Stop reason: SURG

## 2024-05-28 RX ORDER — PROPOFOL 10 MG/ML
VIAL (ML) INTRAVENOUS AS NEEDED
Status: DISCONTINUED | OUTPATIENT
Start: 2024-05-28 | End: 2024-05-28 | Stop reason: SURG

## 2024-05-28 RX ORDER — ONDANSETRON 2 MG/ML
4 INJECTION INTRAMUSCULAR; INTRAVENOUS ONCE
Status: COMPLETED | OUTPATIENT
Start: 2024-05-28 | End: 2024-05-28

## 2024-05-28 RX ORDER — LIDOCAINE HYDROCHLORIDE 20 MG/ML
INJECTION, SOLUTION EPIDURAL; INFILTRATION; INTRACAUDAL; PERINEURAL AS NEEDED
Status: DISCONTINUED | OUTPATIENT
Start: 2024-05-28 | End: 2024-05-28 | Stop reason: SURG

## 2024-05-28 RX ADMIN — GLUCAGON 1 MG: KIT at 08:30

## 2024-05-28 RX ADMIN — SODIUM CHLORIDE, POTASSIUM CHLORIDE, SODIUM LACTATE AND CALCIUM CHLORIDE 30 ML/HR: 600; 310; 30; 20 INJECTION, SOLUTION INTRAVENOUS at 07:21

## 2024-05-28 RX ADMIN — Medication 100 MCG: at 08:32

## 2024-05-28 RX ADMIN — PROPOFOL 50 MG: 10 INJECTION, EMULSION INTRAVENOUS at 08:24

## 2024-05-28 RX ADMIN — Medication 100 MCG: at 08:28

## 2024-05-28 RX ADMIN — PROPOFOL 50 MG: 10 INJECTION, EMULSION INTRAVENOUS at 08:26

## 2024-05-28 RX ADMIN — ONDANSETRON 4 MG: 2 INJECTION INTRAMUSCULAR; INTRAVENOUS at 07:22

## 2024-05-28 RX ADMIN — LIDOCAINE HYDROCHLORIDE 50 MG: 20 INJECTION, SOLUTION INTRAVENOUS at 08:24

## 2024-05-28 RX ADMIN — PROPOFOL 100 MCG/KG/MIN: 10 INJECTION, EMULSION INTRAVENOUS at 08:27

## 2024-05-28 NOTE — H&P
Pre Procedure History & Physical    Chief Complaint:   Past history colon polyp    Subjective     HPI:   Past history of colon polyps    Past Medical History:   Past Medical History:   Diagnosis Date    Abnormal ECG     Anemia     Arrhythmia     Arthritis     Asthma     inhaler prn    Benign essential hypertension     Cancer     skin    Cervicalgia     Coronary artery disease     COVID-19 long hauler manifesting chronic fatigue 01/16/2023    Diverticulosis     Eustachian tube dysfunction, bilateral 08/17/2022    GERD (gastroesophageal reflux disease)     Glaucoma     Heart valve disease     History of degenerative disc disease     Hypertension     Neuropathy     Pacemaker     st julius     Paroxysmal atrial fibrillation 07/13/2019     ·  ablation in 2008  afib  · Repeat fib ablation '17 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 but the roof line required additrional ablation   · 3/19 and was found to have an atypical rigfht atrial flutter, an 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 and a left atrial tachycardia near the AV node   · She developed recurrent SVT in the 130-150 range and was taken back to the lab    PONV (postoperative nausea and vomiting)     SVT (supraventricular tachycardia)     Thyroid disease     no med/cyst on thyroid       Past Surgical History:  Past Surgical History:   Procedure Laterality Date    CARDIAC ABLATION      x4 - last 2019    CARDIAC CATHETERIZATION      CARDIAC ELECTROPHYSIOLOGY MAPPING AND ABLATION      CATARACT EXTRACTION, BILATERAL      CHOLECYSTECTOMY      COLONOSCOPY  02/08/2017    DR MANTILLA HISTORY OF COLON POLYPS    COLONOSCOPY  09/30/2021    dr mantilla    COLONOSCOPY N/A 09/30/2021    Procedure: COLONOSCOPY with BIOPSY/POLYPECTOMY COLD SNARE;  Surgeon: Tim Mantilla MD;  Location: [facility] ENDOSCOPY;  Service: Gastroenterology;  Laterality: N/A;  COLON POLYPS    EAR TUBES Bilateral 08/30/2022    Procedure: BILATERAL MYRINGOTOMY WITH INSERTION OF EAR  TUBES, NASOPHARYNGOSCOPY;  Surgeon: Ag Pepe MD;  Location: [facility] MAIN OR;  Service: ENT;  Laterality: Bilateral;    ENDOSCOPY  06/18/2018    DR MARIE    ENDOSCOPY N/A 12/13/2022    Procedure: ESOPHAGOGASTRODUODENOSCOPY WITH BIOPSIES;  Surgeon: Tim Marie MD;  Location: [facility] ENDOSCOPY;  Service: Gastroenterology;  Laterality: N/A;  HIATAL HERNIA    EYE SURGERY      Macular tear repair 3/1/2021 x2    HYSTERECTOMY      INSERT / REPLACE / REMOVE PACEMAKER      JOINT REPLACEMENT Right     total hip replacement     JOINT REPLACEMENT Bilateral     TKR    PACEMAKER REPLACEMENT      SIGMOIDOSCOPY  12/13/2022    Procedure: SIGMOIDOSCOPY FLEXIBLE;  Surgeon: Tim Marie MD;  Location: [facility] ENDOSCOPY;  Service: Gastroenterology;;  DIVERTICULOSIS    TONSILLECTOMY      VITRECTOMY PARS PLANA W/ REPAIR OF MACULAR HOLE         Family History:  Family History   Problem Relation Age of Onset    Heart attack Mother     Heart disease Mother     Diabetes Mother     Arthritis Mother     Arrhythmia Mother     Heart attack Father     Coronary artery disease Father     Heart disease Father     Diabetes Father     Colon cancer Brother     Rectal cancer Brother 30    Heart disease Brother     Diabetes Brother     Arthritis Brother     Malig Hyperthermia Neg Hx        Social History:   reports that she has never smoked. She has never been exposed to tobacco smoke. She has never used smokeless tobacco. She reports that she does not drink alcohol and does not use drugs.    Medications:   Medications Prior to Admission   Medication Sig Dispense Refill Last Dose    albuterol sulfate  (90 Base) MCG/ACT inhaler 2 puffs Daily As Needed.   5/28/2024    apixaban (Eliquis) 5 MG tablet tablet Take 1 tablet by mouth Every 12 (Twelve) Hours. 180 tablet 3 Past Week    methocarbamol (Robaxin) 500 MG tablet Take 1 tablet by mouth 3 (Three) Times a Day As Needed for Muscle Spasms. 60 tablet 3 Past Month    metoprolol  tartrate (LOPRESSOR) 25 MG tablet TAKE 1 TABLET BY MOUTH 2 (TWO) TIMES A DAY. 180 tablet 3 5/27/2024    olmesartan-hydrochlorothiazide (BENICAR HCT) 40-12.5 MG per tablet TAKE 1 TABLET BY MOUTH DAILY. 90 tablet 3 5/27/2024    traMADol (ULTRAM) 50 MG tablet Take 1 tablet by mouth Every 6 (Six) Hours As Needed for Moderate Pain . 90 tablet 2 Past Week    dexAMETHasone (DECADRON) 4 MG tablet Take 1 tablet by mouth 3 (Three) Times a Day. 15 tablet 0     famotidine (PEPCID) 20 MG tablet Take 1 tablet by mouth Every Night.       Fluticasone-Umeclidin-Vilant (Trelegy Ellipta) 200-62.5-25 MCG/ACT aerosol powder  Inhale 1 puff Daily. 60 each 5     folic acid (FOLVITE) 1 MG tablet Take 1 tablet by mouth Daily.       montelukast (Singulair) 10 MG tablet Take 1 tablet by mouth Every Night. 30 tablet 5     Omeprazole Magnesium (PRILOSEC OTC PO) Take 20 mg by mouth Daily.       PARoxetine (PAXIL) 20 MG tablet TAKE 1 TABLET BY MOUTH EVERY MORNING. 90 tablet 3     pravastatin (PRAVACHOL) 10 MG tablet Take 1 tablet by mouth Every Night. 90 tablet 3        Allergies:  Doxycycline, Lariam [mefloquine], Lisinopril, and Talwin [pentazocine]        Objective     Blood pressure 172/75, pulse 62, temperature 98.8 °F (37.1 °C), temperature source Temporal, resp. rate 18, weight 78.6 kg (173 lb 4.5 oz), SpO2 95%.    Physical Exam   Constitutional: Pt is oriented to person, place, and time and well-developed, well-nourished, and in no distress.   Mouth/Throat: Oropharynx is clear and moist.   Neck: Normal range of motion.   Cardiovascular: Normal rate, regular rhythm and normal heart sounds.    Pulmonary/Chest: Effort normal and breath sounds normal.   Abdominal: Soft. Nontender  Skin: Skin is warm and dry.   Psychiatric: Mood, memory, affect and judgment normal.     Assessment & Plan     Diagnosis:  History of colon polyps    Anticipated Surgical Procedure:  Colonoscopy    The risks, benefits, and alternatives of this procedure have been  discussed with the patient or the responsible party- the patient understands and agrees to proceed.

## 2024-05-28 NOTE — ANESTHESIA POSTPROCEDURE EVALUATION
Patient: Suzanna Santos    Procedure Summary       Date: 05/28/24 Room / Location: [facility] ENDOSCOPY 4 / [facility] ENDOSCOPY    Anesthesia Start: 0820 Anesthesia Stop: 0909    Procedure: COLONOSCOPY Diagnosis:       Encounter for screening for malignant neoplasm of colon      (Encounter for screening for malignant neoplasm of colon [Z12.11])    Surgeons: Tim Mixon MD Provider: Sabra Morillo CRNA    Anesthesia Type: general ASA Status: 4            Anesthesia Type: general    Vitals  Vitals Value Taken Time   /75 05/28/24 0920   Temp 36.3 °C (97.4 °F) 05/28/24 0920   Pulse 61 05/28/24 0920   Resp 17 05/28/24 0920   SpO2 100 % 05/28/24 0920           Post Anesthesia Care and Evaluation    Post-procedure mental status: acceptable.  Pain management: satisfactory to patient    Airway patency: patent  Anesthetic complications: No anesthetic complications    Cardiovascular status: acceptable  Respiratory status: acceptable    Comments: Per chart review

## 2024-05-30 ENCOUNTER — TELEPHONE (OUTPATIENT)
Dept: GASTROENTEROLOGY | Facility: CLINIC | Age: 74
End: 2024-05-30
Payer: MEDICARE

## 2024-06-03 NOTE — TELEPHONE ENCOUNTER
Called pt. No Answer. Left message for pt to call back.    RE: Colonoscopy Results/Plan    Postponing Patient Call until 6.4.24

## 2024-06-10 ENCOUNTER — LAB (OUTPATIENT)
Dept: LAB | Facility: HOSPITAL | Age: 74
End: 2024-06-10
Payer: MEDICARE

## 2024-06-10 DIAGNOSIS — E55.9 VITAMIN D DEFICIENCY: ICD-10-CM

## 2024-06-10 DIAGNOSIS — I48.91 ATRIAL FIBRILLATION, UNSPECIFIED TYPE: ICD-10-CM

## 2024-06-10 DIAGNOSIS — E78.2 MIXED HYPERLIPIDEMIA: ICD-10-CM

## 2024-06-10 DIAGNOSIS — I10 ESSENTIAL HYPERTENSION: ICD-10-CM

## 2024-06-10 DIAGNOSIS — Z29.89 NEED FOR MALARIA PROPHYLAXIS: ICD-10-CM

## 2024-06-10 DIAGNOSIS — J45.41 MODERATE PERSISTENT ASTHMA WITH ACUTE EXACERBATION: ICD-10-CM

## 2024-06-10 DIAGNOSIS — I48.0 PAF (PAROXYSMAL ATRIAL FIBRILLATION): ICD-10-CM

## 2024-06-10 DIAGNOSIS — D50.9 IRON DEFICIENCY ANEMIA, UNSPECIFIED IRON DEFICIENCY ANEMIA TYPE: ICD-10-CM

## 2024-06-10 DIAGNOSIS — Z00.00 MEDICARE ANNUAL WELLNESS VISIT, SUBSEQUENT: ICD-10-CM

## 2024-06-10 LAB
ALBUMIN SERPL-MCNC: 4.3 G/DL (ref 3.5–5.2)
ALBUMIN/GLOB SERPL: 2 G/DL
ALP SERPL-CCNC: 84 U/L (ref 39–117)
ALT SERPL W P-5'-P-CCNC: 11 U/L (ref 1–33)
ANION GAP SERPL CALCULATED.3IONS-SCNC: 9.2 MMOL/L (ref 5–15)
AST SERPL-CCNC: 15 U/L (ref 1–32)
BASOPHILS # BLD AUTO: 0.12 10*3/MM3 (ref 0–0.2)
BASOPHILS NFR BLD AUTO: 1.5 % (ref 0–1.5)
BILIRUB CONJ SERPL-MCNC: <0.2 MG/DL (ref 0–0.3)
BILIRUB SERPL-MCNC: 0.4 MG/DL (ref 0–1.2)
BUN SERPL-MCNC: 18 MG/DL (ref 8–23)
BUN/CREAT SERPL: 18.2 (ref 7–25)
CALCIUM SPEC-SCNC: 9.6 MG/DL (ref 8.6–10.5)
CHLORIDE SERPL-SCNC: 104 MMOL/L (ref 98–107)
CHOLEST SERPL-MCNC: 172 MG/DL (ref 0–200)
CO2 SERPL-SCNC: 26.8 MMOL/L (ref 22–29)
CREAT SERPL-MCNC: 0.99 MG/DL (ref 0.57–1)
DEPRECATED RDW RBC AUTO: 38.6 FL (ref 37–54)
EGFRCR SERPLBLD CKD-EPI 2021: 59.6 ML/MIN/1.73
EOSINOPHIL # BLD AUTO: 0.37 10*3/MM3 (ref 0–0.4)
EOSINOPHIL NFR BLD AUTO: 4.6 % (ref 0.3–6.2)
ERYTHROCYTE [DISTWIDTH] IN BLOOD BY AUTOMATED COUNT: 18.6 % (ref 12.3–15.4)
GLOBULIN UR ELPH-MCNC: 2.1 GM/DL
GLUCOSE SERPL-MCNC: 103 MG/DL (ref 65–99)
HCT VFR BLD AUTO: 38 % (ref 34–46.6)
HDLC SERPL-MCNC: 55 MG/DL (ref 40–60)
HGB BLD-MCNC: 11.4 G/DL (ref 12–15.9)
IMM GRANULOCYTES # BLD AUTO: 0.03 10*3/MM3 (ref 0–0.05)
IMM GRANULOCYTES NFR BLD AUTO: 0.4 % (ref 0–0.5)
LDLC SERPL CALC-MCNC: 100 MG/DL (ref 0–100)
LDLC/HDLC SERPL: 1.79 {RATIO}
LYMPHOCYTES # BLD AUTO: 1.3 10*3/MM3 (ref 0.7–3.1)
LYMPHOCYTES NFR BLD AUTO: 16.3 % (ref 19.6–45.3)
MCH RBC QN AUTO: 19 PG (ref 26.6–33)
MCHC RBC AUTO-ENTMCNC: 30 G/DL (ref 31.5–35.7)
MCV RBC AUTO: 63.4 FL (ref 79–97)
MONOCYTES # BLD AUTO: 0.76 10*3/MM3 (ref 0.1–0.9)
MONOCYTES NFR BLD AUTO: 9.5 % (ref 5–12)
NEUTROPHILS NFR BLD AUTO: 5.38 10*3/MM3 (ref 1.7–7)
NEUTROPHILS NFR BLD AUTO: 67.7 % (ref 42.7–76)
PLATELET # BLD AUTO: 227 10*3/MM3 (ref 140–450)
POTASSIUM SERPL-SCNC: 4.5 MMOL/L (ref 3.5–5.2)
PROT SERPL-MCNC: 6.4 G/DL (ref 6–8.5)
RBC # BLD AUTO: 5.99 10*6/MM3 (ref 3.77–5.28)
SODIUM SERPL-SCNC: 140 MMOL/L (ref 136–145)
T4 FREE SERPL-MCNC: 1.31 NG/DL (ref 0.92–1.68)
TRIGL SERPL-MCNC: 92 MG/DL (ref 0–150)
TSH SERPL DL<=0.05 MIU/L-ACNC: 2.12 UIU/ML (ref 0.27–4.2)
VLDLC SERPL-MCNC: 17 MG/DL (ref 5–40)
WBC NRBC COR # BLD AUTO: 7.96 10*3/MM3 (ref 3.4–10.8)

## 2024-06-10 PROCEDURE — 84443 ASSAY THYROID STIM HORMONE: CPT

## 2024-06-10 PROCEDURE — 84439 ASSAY OF FREE THYROXINE: CPT

## 2024-06-10 PROCEDURE — 82248 BILIRUBIN DIRECT: CPT

## 2024-06-10 PROCEDURE — 80053 COMPREHEN METABOLIC PANEL: CPT

## 2024-06-10 PROCEDURE — 36415 COLL VENOUS BLD VENIPUNCTURE: CPT

## 2024-06-10 PROCEDURE — 85025 COMPLETE CBC W/AUTO DIFF WBC: CPT

## 2024-06-10 PROCEDURE — 80061 LIPID PANEL: CPT

## 2024-06-11 ENCOUNTER — TELEPHONE (OUTPATIENT)
Dept: CARDIOLOGY | Facility: CLINIC | Age: 74
End: 2024-06-11
Payer: MEDICARE

## 2024-06-17 ENCOUNTER — OFFICE VISIT (OUTPATIENT)
Dept: INTERNAL MEDICINE | Age: 74
End: 2024-06-17
Payer: MEDICARE

## 2024-06-17 VITALS
HEIGHT: 64 IN | OXYGEN SATURATION: 97 % | DIASTOLIC BLOOD PRESSURE: 79 MMHG | BODY MASS INDEX: 29.26 KG/M2 | SYSTOLIC BLOOD PRESSURE: 116 MMHG | HEART RATE: 88 BPM | WEIGHT: 171.4 LBS

## 2024-06-17 DIAGNOSIS — M19.90 ARTHRITIS: ICD-10-CM

## 2024-06-17 DIAGNOSIS — Z98.890 H/O CARDIAC RADIOFREQUENCY ABLATION: ICD-10-CM

## 2024-06-17 DIAGNOSIS — F41.1 ANXIETY, GENERALIZED: ICD-10-CM

## 2024-06-17 DIAGNOSIS — J45.41 MODERATE PERSISTENT ASTHMA WITH ACUTE EXACERBATION: ICD-10-CM

## 2024-06-17 DIAGNOSIS — G93.32 COVID-19 LONG HAULER MANIFESTING CHRONIC FATIGUE: ICD-10-CM

## 2024-06-17 DIAGNOSIS — I48.11 LONGSTANDING PERSISTENT ATRIAL FIBRILLATION: Primary | ICD-10-CM

## 2024-06-17 DIAGNOSIS — R05.1 ACUTE COUGH: ICD-10-CM

## 2024-06-17 DIAGNOSIS — I10 ESSENTIAL HYPERTENSION: ICD-10-CM

## 2024-06-17 DIAGNOSIS — R01.1 CARDIAC MURMUR, UNSPECIFIED: ICD-10-CM

## 2024-06-17 DIAGNOSIS — D50.9 IRON DEFICIENCY ANEMIA, UNSPECIFIED IRON DEFICIENCY ANEMIA TYPE: ICD-10-CM

## 2024-06-17 DIAGNOSIS — K21.9 GASTRO-ESOPHAGEAL REFLUX DISEASE WITHOUT ESOPHAGITIS: ICD-10-CM

## 2024-06-17 DIAGNOSIS — U09.9 COVID-19 LONG HAULER MANIFESTING CHRONIC FATIGUE: ICD-10-CM

## 2024-06-17 DIAGNOSIS — E78.2 MIXED HYPERLIPIDEMIA: ICD-10-CM

## 2024-06-17 DIAGNOSIS — E55.9 VITAMIN D DEFICIENCY: ICD-10-CM

## 2024-06-17 PROCEDURE — G2211 COMPLEX E/M VISIT ADD ON: HCPCS | Performed by: INTERNAL MEDICINE

## 2024-06-17 PROCEDURE — 3074F SYST BP LT 130 MM HG: CPT | Performed by: INTERNAL MEDICINE

## 2024-06-17 PROCEDURE — 3078F DIAST BP <80 MM HG: CPT | Performed by: INTERNAL MEDICINE

## 2024-06-17 PROCEDURE — 99214 OFFICE O/P EST MOD 30 MIN: CPT | Performed by: INTERNAL MEDICINE

## 2024-06-17 NOTE — PROGRESS NOTES
"CHIEF COMPLAINT/ HPI: Patient is here to follow-up with history of hypertension, paroxysmal atrial fibrillation, she is doing well otherwise she has not been in the hospital she is getting ready go to [place] this fall with her family, for mission trip, still on anticoagulation  Follow-up and Cough    Patient has chronic back pain especially getting up and walking in the mornings, she has been using a lidocaine patch at times,    Use topical liniments for her right foot toe and top of her foot pain,    Hair loss concerns,      Objective   Vital Signs  Vitals:    06/17/24 1348   BP: 116/79   BP Location: Right arm   Patient Position: Sitting   Cuff Size: Adult   Pulse: 88   SpO2: 97%   Weight: 77.7 kg (171 lb 6.4 oz)   Height: 162.6 cm (64.02\")      Body mass index is 29.4 kg/m².  Review of Systems   Constitutional: Negative.    HENT: Negative.     Eyes: Negative.    Respiratory: Negative.     Cardiovascular: Negative.    Gastrointestinal: Negative.    Endocrine: Negative.    Genitourinary: Negative.    Musculoskeletal: Negative.    Allergic/Immunologic: Negative.    Neurological: Negative.    Hematological: Negative.    Psychiatric/Behavioral: Negative.        Physical Exam  Constitutional:       General: She is not in acute distress.     Appearance: Normal appearance.   HENT:      Head: Normocephalic.      Mouth/Throat:      Mouth: Mucous membranes are moist.   Eyes:      Conjunctiva/sclera: Conjunctivae normal.      Pupils: Pupils are equal, round, and reactive to light.   Cardiovascular:      Rate and Rhythm: Normal rate and regular rhythm.      Pulses: Normal pulses.      Heart sounds: Normal heart sounds.   Pulmonary:      Effort: Pulmonary effort is normal.      Breath sounds: Normal breath sounds.   Abdominal:      General: Abdomen is flat. Bowel sounds are normal.      Palpations: Abdomen is soft.   Musculoskeletal:         General: No swelling. Normal range of motion.      Cervical back: Neck supple. "   Skin:     General: Skin is warm and dry.      Coloration: Skin is not jaundiced.   Neurological:      General: No focal deficit present.      Mental Status: She is alert and oriented to person, place, and time. Mental status is at baseline.   Psychiatric:         Mood and Affect: Mood normal.         Behavior: Behavior normal.         Thought Content: Thought content normal.         Judgment: Judgment normal.        Result Review :   Lab Results   Component Value Date     04/30/2019     CMP          7/10/2023    11:45 6/10/2024    09:31   CMP   Glucose 103  103    BUN 22  18    Creatinine 0.88  0.99    EGFR 69.1  59.6    Sodium 138  140    Potassium 4.4  4.5    Chloride 103  104    Calcium 10.2  9.6    Total Protein 6.6  6.4    Albumin 4.4  4.3    Globulin 2.2  2.1    Total Bilirubin 0.6  0.4    Alkaline Phosphatase 72  84    AST (SGOT) 20  15    ALT (SGPT) 16  11    Albumin/Globulin Ratio 2.0  2.0    BUN/Creatinine Ratio 25.0  18.2    Anion Gap 7.0  9.2      CBC w/diff          7/10/2023    11:45 6/10/2024    09:31   CBC w/Diff   WBC 6.20  7.96    RBC 5.40  5.99    Hemoglobin 11.0  11.4    Hematocrit 34.6  38.0    MCV 64.1  63.4    MCH 20.4  19.0    MCHC 31.8  30.0    RDW 16.2  18.6    Platelets 201  227    Neutrophil Rel %  67.7    Immature Granulocyte Rel %  0.4    Lymphocyte Rel %  16.3    Monocyte Rel %  9.5    Eosinophil Rel %  4.6    Basophil Rel %  1.5       Lipid Panel          7/10/2023    11:45 6/10/2024    09:31   Lipid Panel   Total Cholesterol 206  172    Triglycerides 86  92    HDL Cholesterol 51  55    VLDL Cholesterol 15  17    LDL Cholesterol  140  100    LDL/HDL Ratio 2.70  1.79       Lab Results   Component Value Date    TSH 2.120 06/10/2024    TSH 2.320 07/10/2023    TSH 1.740 01/16/2023      Lab Results   Component Value Date    FREET4 1.31 06/10/2024    FREET4 1.32 07/10/2023    FREET4 1.38 01/16/2023                          Visit Diagnoses:    ICD-10-CM ICD-9-CM   1. Longstanding  persistent atrial fibrillation  I48.11 427.31   2. Acute cough  R05.1 786.2   3. COVID-19 long hauler manifesting chronic fatigue  G93.32 780.79    U09.9 139.8   4. Moderate persistent asthma with acute exacerbation  J45.41 493.92   5. Anxiety, generalized  F41.1 300.02   6. Arthritis  M19.90 716.90   7. Vitamin D deficiency  E55.9 268.9   8. Essential hypertension  I10 401.9   9. Mixed hyperlipidemia  E78.2 272.2   10. Gastro-esophageal reflux disease without esophagitis  K21.9 530.81   11. Iron deficiency anemia, unspecified iron deficiency anemia type  D50.9 280.9   12. Cardiac murmur, unspecified  R01.1 785.2   13. H/O cardiac radiofrequency ablation  Z98.890 V15.1       Assessment and Plan   Diagnoses and all orders for this visit:    1. Longstanding persistent atrial fibrillation (Primary)  -     CBC & Differential; Future  -     Comprehensive Metabolic Panel; Future  -     Lipid Panel; Future  -     TSH+Free T4; Future    2. Acute cough  -     CBC & Differential; Future  -     Comprehensive Metabolic Panel; Future  -     Lipid Panel; Future  -     TSH+Free T4; Future    3. COVID-19 long hauler manifesting chronic fatigue  -     CBC & Differential; Future  -     Comprehensive Metabolic Panel; Future  -     Lipid Panel; Future  -     TSH+Free T4; Future    4. Moderate persistent asthma with acute exacerbation  -     CBC & Differential; Future  -     Comprehensive Metabolic Panel; Future  -     Lipid Panel; Future  -     TSH+Free T4; Future    5. Anxiety, generalized  -     CBC & Differential; Future  -     Comprehensive Metabolic Panel; Future  -     Lipid Panel; Future  -     TSH+Free T4; Future    6. Arthritis  -     CBC & Differential; Future  -     Comprehensive Metabolic Panel; Future  -     Lipid Panel; Future  -     TSH+Free T4; Future    7. Vitamin D deficiency  -     CBC & Differential; Future  -     Comprehensive Metabolic Panel; Future  -     Lipid Panel; Future  -     TSH+Free T4; Future    8.  Essential hypertension  -     CBC & Differential; Future  -     Comprehensive Metabolic Panel; Future  -     Lipid Panel; Future  -     TSH+Free T4; Future    9. Mixed hyperlipidemia  -     CBC & Differential; Future  -     Comprehensive Metabolic Panel; Future  -     Lipid Panel; Future  -     TSH+Free T4; Future    10. Gastro-esophageal reflux disease without esophagitis  -     CBC & Differential; Future  -     Comprehensive Metabolic Panel; Future  -     Lipid Panel; Future  -     TSH+Free T4; Future    11. Iron deficiency anemia, unspecified iron deficiency anemia type  -     CBC & Differential; Future  -     Comprehensive Metabolic Panel; Future  -     Lipid Panel; Future  -     TSH+Free T4; Future    12. Cardiac murmur, unspecified  -     CBC & Differential; Future  -     Comprehensive Metabolic Panel; Future  -     Lipid Panel; Future  -     TSH+Free T4; Future    13. H/O cardiac radiofrequency ablation  -     CBC & Differential; Future  -     Comprehensive Metabolic Panel; Future  -     Lipid Panel; Future  -     TSH+Free T4; Future    Hair loss, patient is going to start biotin over-the-counter June 2024,    ANXIETY, DEPRESSION--continues Paxil 20 mg daily     Malarial prophylaxis visit today, will send in Malarone for #25 once a day July 17, 2023     Tinnitus, roaring in head had recent tympanoplasty's performed by Dr. Umaña in August 2022 recent lab work noted August 2022 and chest x-ray no active disease August 23, 2022--- should improve also with some Ativan, she has a follow-up with ENT next week     Covid infection January 2022, with probable long COVID with fatigue, discussed January 2023     acute T7 compression fracture after fall hitting her sternum with chest contusion, presyncope with head coontusion also and back pain, May 5 and sixth 2021, went to the emergency room, diagnosed with UTI,---     ARTHRITIS, IN HANDS , THUMBS , R FOOT, LOW BACK PAIN, NECK OCC.--Now with cervical radiculopathy C4-5  area, -- rheumatologist -----was diagnosed clinically with rheumatoid arthritis, CCP antibody and rheumatoid factors are negative on blood test per rheumatology September 2020,-----cont --tylenol arthritis , methocarbamol as needed tramadol as needed-- rheumatology, okay to     MONCHO Barney with rapid rate ---first ablation 2008, second 1 was in July 2017 subsequent ablations in 2019 with pacemaker done July 29, 2019,      continues--ON METOPROLOL,25 BID , ELIQUIS 5 MG BID      HTN, --- continues, metoprolol 25 TWICE A DAY,  olmesartan 40/12.5 HCT,     ANEMIA, THALLESEMIA- STABLE-      Glaucoma, laser treatment of the left eye, August 2021, and macular tear in the right eye, has been seeing surgery and ophthalmology as of September 2021     R TKA, 5/13     HEART MURMUR, -aortic regurgitation moderate by echo February 2016, will follow with cardiology-- dr brown      GERD- , EGD JUNE 2018, CYNTHIA, DAMIÁNGITIS-- cont Prilosec a.m., Pepcid nightly,--- EGD December 13, 2022 Dr. Dobbs, also had a virtual colonoscopy due to difficulty with cannulating colonoscopy January 2023, virtual colonoscopy showed negative CT colonoscopy nonobstructing left renal calculus January 6, 2023     Previous cholecystectomy, previous left total knee replacement 2010, previous right total hip replacement 2008, JACINTO and BSO,  R KNEE REVISION 6/2414 , WITH DR JOY     thryoid cyst, nodule -previous thyroid ultrasounds, March 2014 and April 2016 October 2020 shows large mass in the left thyroid completely cystic, measures 7.2 cm previously measuring 6.1 cm, a 1 cm hypoechoic right thyroid nodule measuring up to 0.6 cm previously, and another smaller nodule at 0.6 cm in the right lower lobe of the thyroid,----thyroid ultrasound October 7, 2021 shows bilateral thyroid nodules large left cystic thyroid nodule has increased in size benign characteristics follow-up again in 1 year the cyst measures 8.6 x 3 cm slightly increased in size------ consider  ENT follow-up drainage, second opinion, versus watchful waiting,               Follow Up   Return in about 6 months (around 12/17/2024).  Patient was given instructions and counseling regarding her condition or for health maintenance advice. Please see specific information pulled into the AVS if appropriate.

## 2024-06-18 RX ORDER — METHOCARBAMOL 500 MG/1
TABLET, FILM COATED ORAL
Qty: 60 TABLET | Refills: 3 | Status: SHIPPED | OUTPATIENT
Start: 2024-06-18

## 2024-07-17 RX ORDER — PAROXETINE HYDROCHLORIDE 20 MG/1
20 TABLET, FILM COATED ORAL EVERY MORNING
Qty: 90 TABLET | Refills: 3 | Status: SHIPPED | OUTPATIENT
Start: 2024-07-17

## 2024-07-24 ENCOUNTER — OFFICE VISIT (OUTPATIENT)
Dept: CARDIOLOGY | Facility: CLINIC | Age: 74
End: 2024-07-24
Payer: MEDICARE

## 2024-07-24 VITALS
SYSTOLIC BLOOD PRESSURE: 150 MMHG | HEIGHT: 64 IN | BODY MASS INDEX: 29.88 KG/M2 | DIASTOLIC BLOOD PRESSURE: 80 MMHG | WEIGHT: 175 LBS | HEART RATE: 68 BPM

## 2024-07-24 DIAGNOSIS — I35.1 MILD AORTIC REGURGITATION: ICD-10-CM

## 2024-07-24 DIAGNOSIS — I10 ESSENTIAL HYPERTENSION: ICD-10-CM

## 2024-07-24 DIAGNOSIS — I48.0 PAF (PAROXYSMAL ATRIAL FIBRILLATION): ICD-10-CM

## 2024-07-24 DIAGNOSIS — Z98.890 H/O CARDIAC RADIOFREQUENCY ABLATION: ICD-10-CM

## 2024-07-24 DIAGNOSIS — Z95.0 PRESENCE OF CARDIAC PACEMAKER: Primary | ICD-10-CM

## 2024-07-24 PROBLEM — I48.91 ATRIAL FIBRILLATION: Status: RESOLVED | Noted: 2023-07-17 | Resolved: 2024-07-24

## 2024-07-24 PROBLEM — J06.9 ACUTE URI: Status: RESOLVED | Noted: 2022-01-31 | Resolved: 2024-07-24

## 2024-07-24 PROBLEM — R05.1 ACUTE COUGH: Status: RESOLVED | Noted: 2023-11-30 | Resolved: 2024-07-24

## 2024-07-24 PROBLEM — R05.9 COUGH IN ADULT: Status: RESOLVED | Noted: 2023-10-16 | Resolved: 2024-07-24

## 2024-07-24 NOTE — PROGRESS NOTES
Chief Complaint  Follow-up    Subjective            History of Present Illness  Suzanna Santos is a 73-year-old female patient who presents to the office today for follow up.  She has atrial fibrillation with prior ablation, hypertension, mild aortic regurgitation, and presence of pacemaker.  She reports brief episodes of atrial fibrillation that typically resolves on its own.  She reports that 1 day recently she did have to take an extra dose of metoprolol in order to have symptom relief.  She is compliant with medication.  She denies any chest pain, worsening shortness of breath, or lightheadedness.    PMH  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 but the roof line required additrional ablation   · 3/19 and was found to have an atypical rigfht atrial flutter, an 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 and a left atrial tachycardia near the AV node   · She developed recurrent SVT in the 130-150 range and was taken back to the lab    PONV (postoperative nausea and vomiting)     SVT (supraventricular tachycardia)     Thyroid disease     no med/cyst on thyroid         ALLERGY  Allergies   Allergen Reactions    Doxycycline Hives     .    Lariam [Mefloquine] Hives     .    Lisinopril Hives     .    Talwin [Pentazocine] Hallucinations          SURGICALHX  Past Surgical History:   Procedure Laterality Date    CARDIAC ABLATION      x4 - last 2019     CARDIAC CATHETERIZATION      CARDIAC ELECTROPHYSIOLOGY MAPPING AND ABLATION      CATARACT EXTRACTION, BILATERAL      CHOLECYSTECTOMY      COLONOSCOPY  02/08/2017    DR MANTILLA HISTORY OF COLON POLYPS    COLONOSCOPY  09/30/2021    dr mantilla    COLONOSCOPY N/A 09/30/2021    Procedure: COLONOSCOPY with BIOPSY/POLYPECTOMY COLD SNARE;  Surgeon: Tim Mantilla MD;  Location: [facility] END[facility]COPY;  Service: Gastroenterology;  Laterality: N/A;  COLON POLYPS    COLONOSCOPY N/A 5/28/2024    Procedure: COLONOSCOPY;  Surgeon: Tim Mantilla MD;  Location: [facility] ENDOSCOPY;  Service: Gastroenterology;  Laterality: N/A;  HEMORRHOIDS    EAR TUBES Bilateral 08/30/2022    Procedure: BILATERAL MYRINGOTOMY WITH INSERTION OF EAR TUBES, NASOPHARYNGOSCOPY;  Surgeon: Ag Pepe MD;  Location: [facility] MAIN OR;  Service: ENT;  Laterality: Bilateral;    ENDOSCOPY  06/18/2018    DR MANTILLA    ENDOSCOPY N/A 12/13/2022    Procedure: ESOPHAGOGASTRODUODENOSCOPY WITH BIOPSIES;  Surgeon: Tim Mantilla MD;  Location: [facility] ENDOSCOPY;  Service: Gastroenterology;  Laterality: N/A;  HIATAL HERNIA    EYE SURGERY      Macular tear repair 3/1/2021 x2    HYSTERECTOMY      INSERT / REPLACE / REMOVE PACEMAKER      JOINT REPLACEMENT Right     total hip replacement     JOINT REPLACEMENT Bilateral     TKR    PACEMAKER REPLACEMENT      SIGMOIDOSCOPY  12/13/2022    Procedure: SIGMOIDOSCOPY FLEXIBLE;  Surgeon: Tim Mantilla MD;  Location: [facility] ENDOSCOPY;  Service: Gastroenterology;;  DIVERTICULOSIS    TONSILLECTOMY      VITRECTOMY PARS PLANA W/ REPAIR OF MACULAR HOLE            SOC  Social History     Socioeconomic History    Marital status:    Tobacco Use    Smoking status: Never     Passive exposure: Never    Smokeless tobacco: Never    Tobacco comments:     Grew up in smoking environm.   Vaping Use    Vaping status: Never Used   Substance and Sexual Activity    Alcohol use: No    Drug use: Never    Sexual activity: Yes      "Partners: Male     Comment: age         FAMHX  Family History   Problem Relation Age of Onset    Heart attack Mother     Heart disease Mother     Diabetes Mother     Arthritis Mother     Arrhythmia Mother     Heart attack Father     Coronary artery disease Father     Heart disease Father     Diabetes Father     Colon cancer Brother     Rectal cancer Brother 30    Heart disease Brother     Diabetes Brother     Arthritis Brother     Malig Hyperthermia Neg Hx           MEDSIGONLY  Current Outpatient Medications on File Prior to Visit   Medication Sig    albuterol sulfate  (90 Base) MCG/ACT inhaler 2 puffs Daily As Needed.    apixaban (Eliquis) 5 MG tablet tablet Take 1 tablet by mouth Every 12 (Twelve) Hours.    famotidine (PEPCID) 20 MG tablet Take 1 tablet by mouth Every Night.    Fluticasone-Umeclidin-Vilant (Trelegy Ellipta) 200-62.5-25 MCG/ACT aerosol powder  Inhale 1 puff Daily.    folic acid (FOLVITE) 1 MG tablet Take 1 tablet by mouth Daily.    methocarbamol (ROBAXIN) 500 MG tablet TAKE ONE TABLET BY MOUTH THREE TIMES A DAY IF NEEDED FOR MUSCLE SPASMS.    metoprolol tartrate (LOPRESSOR) 25 MG tablet TAKE 1 TABLET BY MOUTH 2 (TWO) TIMES A DAY.    montelukast (Singulair) 10 MG tablet Take 1 tablet by mouth Every Night.    olmesartan-hydrochlorothiazide (BENICAR HCT) 40-12.5 MG per tablet TAKE 1 TABLET BY MOUTH DAILY.    Omeprazole Magnesium (PRILOSEC OTC PO) Take 20 mg by mouth Daily.    PARoxetine (PAXIL) 20 MG tablet TAKE 1 TABLET BY MOUTH EVERY MORNING.    pravastatin (PRAVACHOL) 10 MG tablet Take 1 tablet by mouth Every Night.    traMADol (ULTRAM) 50 MG tablet Take 1 tablet by mouth Every 6 (Six) Hours As Needed for Moderate Pain .     No current facility-administered medications on file prior to visit.         Objective   /80   Pulse 68   Ht 162.6 cm (64.02\")   Wt 79.4 kg (175 lb)   BMI 30.02 kg/m²       Physical Exam  HENT:      Head: Normocephalic.   Neck:      Vascular: No carotid bruit. " "  Cardiovascular:      Rate and Rhythm: Normal rate and regular rhythm.      Pulses: Normal pulses.      Heart sounds: Murmur heard.   Pulmonary:      Effort: Pulmonary effort is normal.      Breath sounds: Normal breath sounds.   Musculoskeletal:      Cervical back: Neck supple.      Right lower leg: No edema.      Left lower leg: No edema.   Skin:     General: Skin is dry.   Neurological:      Mental Status: She is alert and oriented to person, place, and time.   Psychiatric:         Behavior: Behavior normal.       Result Review :   The following data was reviewed by: ROSHAN Corea on 07/24/2024:  No results found for: \"PROBNP\"  CMP          6/10/2024    09:31   CMP   Glucose 103    BUN 18    Creatinine 0.99    EGFR 59.6    Sodium 140    Potassium 4.5    Chloride 104    Calcium 9.6    Total Protein 6.4    Albumin 4.3    Globulin 2.1    Total Bilirubin 0.4    Alkaline Phosphatase 84    AST (SGOT) 15    ALT (SGPT) 11    Albumin/Globulin Ratio 2.0    BUN/Creatinine Ratio 18.2    Anion Gap 9.2      CBC w/diff          6/10/2024    09:31   CBC w/Diff   WBC 7.96    RBC 5.99    Hemoglobin 11.4    Hematocrit 38.0    MCV 63.4    MCH 19.0    MCHC 30.0    RDW 18.6    Platelets 227    Neutrophil Rel % 67.7    Immature Granulocyte Rel % 0.4    Lymphocyte Rel % 16.3    Monocyte Rel % 9.5    Eosinophil Rel % 4.6    Basophil Rel % 1.5       Lab Results   Component Value Date    TSH 2.120 06/10/2024      Lab Results   Component Value Date    FREET4 1.31 06/10/2024      No results found for: \"DDIMERQUANT\"  No results found for: \"MG\"   No results found for: \"DIGOXIN\"   No results found for: \"TROPONINT\"            6/10/2024    09:31   Lipid Panel   Total Cholesterol 172    Triglycerides 92    HDL Cholesterol 55    VLDL Cholesterol 17    LDL Cholesterol  100    LDL/HDL Ratio 1.79        [id number] Score: 5        Assessment and Plan    Diagnoses and all orders for this visit:    1. Presence of cardiac pacemaker " (Primary)  Last home remote session was 6/7/2024 which showed a few episodes of atrial fibrillation however there is less than 1% burden.  The longest episode was 1 minute and 38 seconds.  She has a little over 5 years left on the battery.    2. Paroxysmal atrial fibrillation & 3. H/O cardiac radiofrequency ablation  Symptomatically stable at this time.  I told her if she has to take extra doses of metoprolol very often to let me know so I can raise her base dose.  For now continue 25 mg twice daily.  Continue Eliquis for CVA prevention.  Obtain echocardiogram to assess left ventricular systolic function.  -     Adult Transthoracic Echo Complete W/ Cont if Necessary Per Protocol; Future    4. Essential hypertension  Mildly elevated in office today, she is attributing this to severe back pain. Check blood pressure twice a day for the next two weeks.  She will notify office if persistently elevated.    5. Mild aortic regurgitation  Asymptomatic, continue to monitor with repeat echocardiogram.          Follow Up   Return in about 6 months (around 1/24/2025) for Follow up with Dr Shook.    Patient was given instructions and counseling regarding her condition or for health maintenance advice. Please see specific information pulled into the AVS if appropriate.     Suzanna CHECO Santos  reports that she has never smoked. She has never been exposed to tobacco smoke. She has never used smokeless tobacco. ROSHAN Cox  07/24/24  15:26 EDT    Dictated Utilizing Dragon Dictation

## 2024-07-30 ENCOUNTER — OFFICE VISIT (OUTPATIENT)
Dept: INTERNAL MEDICINE | Age: 74
End: 2024-07-30
Payer: MEDICARE

## 2024-07-30 ENCOUNTER — HOSPITAL ENCOUNTER (OUTPATIENT)
Dept: GENERAL RADIOLOGY | Facility: HOSPITAL | Age: 74
Discharge: HOME | End: 2024-07-30
Payer: MEDICARE

## 2024-07-30 VITALS
HEART RATE: 68 BPM | SYSTOLIC BLOOD PRESSURE: 144 MMHG | OXYGEN SATURATION: 98 % | TEMPERATURE: 97.8 F | RESPIRATION RATE: 18 BRPM | WEIGHT: 171.6 LBS | DIASTOLIC BLOOD PRESSURE: 82 MMHG | BODY MASS INDEX: 29.3 KG/M2 | HEIGHT: 64 IN

## 2024-07-30 DIAGNOSIS — M54.50 ACUTE LEFT-SIDED LOW BACK PAIN WITHOUT SCIATICA: Primary | ICD-10-CM

## 2024-07-30 DIAGNOSIS — L03.90 WOUND CELLULITIS: ICD-10-CM

## 2024-07-30 DIAGNOSIS — M54.50 ACUTE LEFT-SIDED LOW BACK PAIN WITHOUT SCIATICA: ICD-10-CM

## 2024-07-30 PROCEDURE — 3079F DIAST BP 80-89 MM HG: CPT

## 2024-07-30 PROCEDURE — 1160F RVW MEDS BY RX/DR IN RCRD: CPT

## 2024-07-30 PROCEDURE — 3077F SYST BP >= 140 MM HG: CPT

## 2024-07-30 PROCEDURE — 1159F MED LIST DOCD IN RCRD: CPT

## 2024-07-30 PROCEDURE — 99213 OFFICE O/P EST LOW 20 MIN: CPT

## 2024-07-30 PROCEDURE — 1125F AMNT PAIN NOTED PAIN PRSNT: CPT

## 2024-07-30 PROCEDURE — G2211 COMPLEX E/M VISIT ADD ON: HCPCS

## 2024-07-30 PROCEDURE — 72110 X-RAY EXAM L-2 SPINE 4/>VWS: CPT

## 2024-07-30 RX ORDER — MULTIVIT WITH MINERALS/LUTEIN
250 TABLET ORAL DAILY
COMMUNITY

## 2024-07-30 RX ORDER — SACCHAROMYCES BOULARDII 250 MG
250 CAPSULE ORAL 2 TIMES DAILY
COMMUNITY

## 2024-07-30 RX ORDER — LANOLIN ALCOHOL/MO/W.PET/CERES
50 CREAM (GRAM) TOPICAL DAILY
COMMUNITY

## 2024-07-30 RX ORDER — CHOLECALCIFEROL (VITAMIN D3) 25 MCG
1000 TABLET ORAL DAILY
COMMUNITY

## 2024-07-30 RX ORDER — METHYLPREDNISOLONE 4 MG/1
TABLET ORAL
Qty: 21 EACH | Refills: 0 | Status: SHIPPED | OUTPATIENT
Start: 2024-07-30 | End: 2024-08-04

## 2024-07-30 RX ORDER — CEPHALEXIN 500 MG/1
500 CAPSULE ORAL 3 TIMES DAILY
Qty: 21 CAPSULE | Refills: 0 | Status: SHIPPED | OUTPATIENT
Start: 2024-07-30

## 2024-07-30 NOTE — PROGRESS NOTES
Please call patient with results.     1. Degenerative changes of the lumbar spine   2. Chronic appearing mild compression deformities of T12 and L2.      No evidence of acute fracture. Continue with plan of care as discussed. If no improvement, let me know. May need PT. thanks

## 2024-07-30 NOTE — ASSESSMENT & PLAN NOTE
Patient injured her back last week she also scraped her leg.  She states that it is red and is not getting any better.  She has started to apply Neosporin to it.  There is warmth to the touch.  She denies any fever chills or bodyaches.  Will start patient on Keflex 500 mg 3 times daily for 7 days.  Mupirocin ointment prescribed as well.  If she develops any fever, chills, worsening pain or redness she is to seek medical attention right away.

## 2024-07-30 NOTE — ASSESSMENT & PLAN NOTE
Patient states last week, she was bent over putting on her socks. When she bent over, she felt a pop in her lower back. She states that since then she has been in pain. She reports that she has limited ROM. Pain is worse on the left lower side.   She is taking robaxin and tramadol for pain. She is also applying heat and ice.   She denies any loss of bowel/bladder habits. She denies any radiating down her legs.   She states she tolerates steroids well.   Will start steroid pack and get xrays  If xrays are negative, will recommend ROM exercises  May need PT as well  Discussed with pt

## 2024-07-30 NOTE — PROGRESS NOTES
Chief Complaint  Back Pain (73 year old female here today complaining of low back pain since last Monday. States she was bending over to put her shoes on and she heard and felt a pop in her low back. States she does have a history of Arthritis and fractures in her spine. Denies the pain radiating down into her legs. )    History of Present Illness  SUBJECTIVE  Suzanna Santos presents to [facility] with complaints of acute lower back pain    Right lower leg wound when she injured her back, she scraped her leg.     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 but the roof line required additrional ablation   · 3/19 and was found to have an atypical rigfht atrial flutter, an 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 and a left atrial tachycardia near the AV node   · She developed recurrent SVT in the 130-150 range and was taken back to the lab    PONV (postoperative nausea and vomiting)     SVT (supraventricular tachycardia)     Thyroid disease     no med/cyst on thyroid      Family History   Problem Relation Age of Onset    Heart attack Mother     Heart disease Mother     Diabetes Mother     Arthritis Mother     Arrhythmia Mother     Heart attack Father     Coronary artery disease Father     Heart disease Father     Diabetes Father     Colon cancer Brother     Rectal cancer Brother 30    Heart disease Brother      Diabetes Brother     Arthritis Brother     Malig Hyperthermia Neg Hx       Past Surgical History:   Procedure Laterality Date    CARDIAC ABLATION      x4 - last 2019    CARDIAC CATHETERIZATION      CARDIAC ELECTROPHYSIOLOGY MAPPING AND ABLATION      CATARACT EXTRACTION, BILATERAL      CHOLECYSTECTOMY      COLONOSCOPY  02/08/2017    DR MANTILLA HISTORY OF COLON POLYPS    COLONOSCOPY  09/30/2021    dr mantilla    COLONOSCOPY N/A 09/30/2021    Procedure: COLONOSCOPY with BIOPSY/POLYPECTOMY COLD SNARE;  Surgeon: Tim Mantilla MD;  Location: [facility] ENDOSCOPY;  Service: Gastroenterology;  Laterality: N/A;  COLON POLYPS    COLONOSCOPY N/A 5/28/2024    Procedure: COLONOSCOPY;  Surgeon: Tim Mantilla MD;  Location: [facility] ENDOSCOPY;  Service: Gastroenterology;  Laterality: N/A;  HEMORRHOIDS    EAR TUBES Bilateral 08/30/2022    Procedure: BILATERAL MYRINGOTOMY WITH INSERTION OF EAR TUBES, NASOPHARYNGOSCOPY;  Surgeon: Ag Pepe MD;  Location: [facility] MAIN OR;  Service: ENT;  Laterality: Bilateral;    ENDOSCOPY  06/18/2018    DR MANTILLA    ENDOSCOPY N/A 12/13/2022    Procedure: ESOPHAGOGASTRODUODENOSCOPY WITH BIOPSIES;  Surgeon: Tim Mantilla MD;  Location: [facility] ENDOSCOPY;  Service: Gastroenterology;  Laterality: N/A;  HIATAL HERNIA    EYE SURGERY      Macular tear repair 3/1/2021 x2    HYSTERECTOMY      INSERT / REPLACE / REMOVE PACEMAKER      JOINT REPLACEMENT Right     total hip replacement     JOINT REPLACEMENT Bilateral     TKR    PACEMAKER REPLACEMENT      SIGMOIDOSCOPY  12/13/2022    Procedure: SIGMOIDOSCOPY FLEXIBLE;  Surgeon: Tim Mantilla MD;  Location: [facility] ENDOSCOPY;  Service: Gastroenterology;;  DIVERTICULOSIS    TONSILLECTOMY      VITRECTOMY PARS PLANA W/ REPAIR OF MACULAR HOLE          Current Outpatient Medications:     albuterol sulfate  (90 Base) MCG/ACT inhaler, 2 puffs Daily As Needed., Disp: , Rfl:     apixaban (Eliquis) 5 MG tablet tablet, Take 1 tablet by mouth Every  12 (Twelve) Hours., Disp: 180 tablet, Rfl: 3    Cholecalciferol 25 MCG (1000 UT) tablet, Take 1 tablet by mouth Daily., Disp: , Rfl:     famotidine (PEPCID) 20 MG tablet, Take 1 tablet by mouth Every Night., Disp: , Rfl:     Fluticasone-Umeclidin-Vilant (Trelegy Ellipta) 200-62.5-25 MCG/ACT aerosol powder , Inhale 1 puff Daily., Disp: 60 each, Rfl: 5    folic acid (FOLVITE) 1 MG tablet, Take 1 tablet by mouth Daily., Disp: , Rfl:     methocarbamol (ROBAXIN) 500 MG tablet, TAKE ONE TABLET BY MOUTH THREE TIMES A DAY IF NEEDED FOR MUSCLE SPASMS., Disp: 60 tablet, Rfl: 3    metoprolol tartrate (LOPRESSOR) 25 MG tablet, TAKE 1 TABLET BY MOUTH 2 (TWO) TIMES A DAY., Disp: 180 tablet, Rfl: 3    montelukast (Singulair) 10 MG tablet, Take 1 tablet by mouth Every Night., Disp: 30 tablet, Rfl: 5    olmesartan-hydrochlorothiazide (BENICAR HCT) 40-12.5 MG per tablet, TAKE 1 TABLET BY MOUTH DAILY., Disp: 90 tablet, Rfl: 3    Omeprazole Magnesium (PRILOSEC OTC PO), Take 20 mg by mouth Daily., Disp: , Rfl:     PARoxetine (PAXIL) 20 MG tablet, TAKE 1 TABLET BY MOUTH EVERY MORNING., Disp: 90 tablet, Rfl: 3    pravastatin (PRAVACHOL) 10 MG tablet, Take 1 tablet by mouth Every Night., Disp: 90 tablet, Rfl: 3    saccharomyces boulardii (FLORASTOR) 250 MG capsule, Take 1 capsule by mouth 2 (Two) Times a Day., Disp: , Rfl:     traMADol (ULTRAM) 50 MG tablet, Take 1 tablet by mouth Every 6 (Six) Hours As Needed for Moderate Pain ., Disp: 90 tablet, Rfl: 2    vitamin B-6 (PYRIDOXINE) 50 MG tablet, Take 1 tablet by mouth Daily., Disp: , Rfl:     vitamin C (ASCORBIC ACID) 250 MG tablet, Take 1 tablet by mouth Daily., Disp: , Rfl:     cephalexin (Keflex) 500 MG capsule, Take 1 capsule by mouth 3 (Three) Times a Day., Disp: 21 capsule, Rfl: 0    methylPREDNISolone (MEDROL) 4 MG dose pack, Take as directed on package instructions., Disp: 21 each, Rfl: 0    mupirocin (BACTROBAN) 2 % ointment, Apply 1 Application topically to the appropriate area  "as directed 3 (Three) Times a Day for 10 days. Apply to affected area., Disp: 22 g, Rfl: 0    OBJECTIVE  Vital Signs:   /82 (BP Location: Left arm, Patient Position: Sitting)   Pulse 68   Temp 97.8 °F (36.6 °C) (Temporal)   Resp 18   Ht 162.6 cm (64.02\")   Wt 77.8 kg (171 lb 9.6 oz)   SpO2 98%   BMI 29.44 kg/m²    Estimated body mass index is 29.44 kg/m² as calculated from the following:    Height as of this encounter: 162.6 cm (64.02\").    Weight as of this encounter: 77.8 kg (171 lb 9.6 oz).     Wt Readings from Last 3 Encounters:   07/30/24 77.8 kg (171 lb 9.6 oz)   07/24/24 79.4 kg (175 lb)   06/17/24 77.7 kg (171 lb 6.4 oz)     BP Readings from Last 3 Encounters:   07/30/24 144/82   07/24/24 150/80   06/17/24 116/79       Physical Exam  Vitals and nursing note reviewed.   Constitutional:       Appearance: Normal appearance.   HENT:      Head: Normocephalic.   Eyes:      Extraocular Movements: Extraocular movements intact.      Conjunctiva/sclera: Conjunctivae normal.   Cardiovascular:      Rate and Rhythm: Normal rate and regular rhythm.      Heart sounds: Normal heart sounds. No murmur heard.  Pulmonary:      Effort: Pulmonary effort is normal.      Breath sounds: Normal breath sounds. No wheezing or rales.   Abdominal:      General: Bowel sounds are normal.      Palpations: Abdomen is soft.      Tenderness: There is no abdominal tenderness. There is no guarding.   Musculoskeletal:      Lumbar back: No tenderness. Decreased range of motion. Negative right straight leg raise test and negative left straight leg raise test.   Skin:     General: Skin is warm and dry.      Findings: Wound (right lower leg) present.   Neurological:      General: No focal deficit present.      Mental Status: She is alert. Mental status is at baseline.   Psychiatric:         Mood and Affect: Mood normal.         Behavior: Behavior normal.         Thought Content: Thought content normal.         Judgment: Judgment normal. "          Result Review        No Images in the past 120 days found..     The above data has been reviewed by ROSHAN Sheffield 07/30/2024 08:56 EDT.          Patient Care Team:  Joshua Lee MD as PCP - General (Internal Medicine)  Austin Morrison MD as Surgeon (Orthopedic Surgery)  Ag Pepe MD as Consulting Physician (Otolaryngology)  Dawit Shook MD as Consulting Physician (Cardiology)            ASSESSMENT & PLAN    Diagnoses and all orders for this visit:    1. Acute left-sided low back pain without sciatica (Primary)  Assessment & Plan:  Patient states last week, she was bent over putting on her socks. When she bent over, she felt a pop in her lower back. She states that since then she has been in pain. She reports that she has limited ROM. Pain is worse on the left lower side.   She is taking robaxin and tramadol for pain. She is also applying heat and ice.   She denies any loss of bowel/bladder habits. She denies any radiating down her legs.   She states she tolerates steroids well.   Will start steroid pack and get xrays  If xrays are negative, will recommend ROM exercises  May need PT as well  Discussed with pt      Orders:  -     methylPREDNISolone (MEDROL) 4 MG dose pack; Take as directed on package instructions.  Dispense: 21 each; Refill: 0  -     XR Spine Lumbar 4+ View; Future    2. Wound cellulitis  Assessment & Plan:  Patient injured her back last week she also scraped her leg.  She states that it is red and is not getting any better.  She has started to apply Neosporin to it.  There is warmth to the touch.  She denies any fever chills or bodyaches.  Will start patient on Keflex 500 mg 3 times daily for 7 days.  Mupirocin ointment prescribed as well.  If she develops any fever, chills, worsening pain or redness she is to seek medical attention right away.    Orders:  -     mupirocin (BACTROBAN) 2 % ointment; Apply 1 Application topically to the appropriate area as directed 3  (Three) Times a Day for 10 days. Apply to affected area.  Dispense: 22 g; Refill: 0  -     cephalexin (Keflex) 500 MG capsule; Take 1 capsule by mouth 3 (Three) Times a Day.  Dispense: 21 capsule; Refill: 0         Tobacco Use: Low Risk  (7/30/2024)    Patient History     Smoking Tobacco Use: Never     Smokeless Tobacco Use: Never     Passive Exposure: Never       Follow Up     Return if symptoms worsen or fail to improve.    Please note that portions of this note were completed with a voice recognition program.    Patient was given instructions and counseling regarding her condition or for health maintenance advice. Please see specific information pulled into the AVS if appropriate.   I have reviewed information obtained and documented by others and I have confirmed the accuracy of this documented note.    ROSHAN Sheffield

## 2024-08-06 ENCOUNTER — TELEPHONE (OUTPATIENT)
Dept: INTERNAL MEDICINE | Age: 74
End: 2024-08-06
Payer: MEDICARE

## 2024-08-06 ENCOUNTER — TELEPHONE (OUTPATIENT)
Dept: CARDIOLOGY | Facility: CLINIC | Age: 74
End: 2024-08-06
Payer: MEDICARE

## 2024-08-06 DIAGNOSIS — M54.50 CHRONIC LOW BACK PAIN, UNSPECIFIED BACK PAIN LATERALITY, UNSPECIFIED WHETHER SCIATICA PRESENT: Primary | ICD-10-CM

## 2024-08-06 DIAGNOSIS — G89.29 CHRONIC LOW BACK PAIN, UNSPECIFIED BACK PAIN LATERALITY, UNSPECIFIED WHETHER SCIATICA PRESENT: Primary | ICD-10-CM

## 2024-08-06 NOTE — TELEPHONE ENCOUNTER
[facility] received a fax that requires your attention. The document has been indexed to the patient’s chart for your review.      Reason for sending: EXTERNAL MEDICAL RECORD NOTIFICATION     Documents Description: CARDIAC CLEARANCE REQ-[facility]-8.6.24    Name of Sender: DORIS GIBBS     Date Indexed: 8.6.24

## 2024-08-06 NOTE — TELEPHONE ENCOUNTER
"  Caller: Suzanna Santos \"Pat\"    Relationship: Self    Best call back number: [phone]    What is the medical concern/diagnosis: LOWER BACK PAIN     What specialty or service is being requested: PHYSICAL THERAPY     What is the provider, practice or medical service name: [facility]     What is the office location: [place]     What is the office phone number: [phone]    Any additional details: PATIENT STATED SHE IS STILL HAVING PAIN IN HER LOWER BACK AND WOULD MANASA TO TRY PHYSICAL THERAPY.               "

## 2024-08-06 NOTE — TELEPHONE ENCOUNTER
Procedure: Dental Extraction     Med Directive: Eliquis    PMH: afib prior ablation, HTN, HLD, mild aortic stenosis     Last Seen: 7/24/24

## 2024-08-09 ENCOUNTER — HOSPITAL ENCOUNTER (OUTPATIENT)
Dept: CARDIOLOGY | Facility: HOSPITAL | Age: 74
Discharge: HOME | End: 2024-08-09
Payer: MEDICARE

## 2024-08-09 DIAGNOSIS — I48.0 PAF (PAROXYSMAL ATRIAL FIBRILLATION): ICD-10-CM

## 2024-08-09 LAB
ASCENDING AORTA: 3.8 CM
BH CV ECHO MEAS - ACS: 1.61 CM
BH CV ECHO MEAS - AO MAX PG: 17.7 MMHG
BH CV ECHO MEAS - AO MEAN PG: 10 MMHG
BH CV ECHO MEAS - AO ROOT DIAM: 3 CM
BH CV ECHO MEAS - AO V2 MAX: 210.3 CM/SEC
BH CV ECHO MEAS - AO V2 VTI: 41.3 CM
BH CV ECHO MEAS - EF(MOD-BP): 52.4 %
BH CV ECHO MEAS - EF(MOD-SP2): 54.3 %
BH CV ECHO MEAS - EF(MOD-SP4): 51.9 %
BH CV ECHO MEAS - IVS/LVPW: 1.33 CM
BH CV ECHO MEAS - IVSD: 1.6 CM
BH CV ECHO MEAS - LA DIMENSION: 4 CM
BH CV ECHO MEAS - LVIDD: 4.7 CM
BH CV ECHO MEAS - LVIDS: 3.4 CM
BH CV ECHO MEAS - LVOT DIAM: 2 CM
BH CV ECHO MEAS - LVPWD: 1.2 CM
BH CV ECHO MEAS - PA ACC TIME: 0.08 SEC
BH CV ECHO MEAS - PA V2 MAX: 180 CM/SEC
BH CV ECHO MEAS - RV MAX PG: 1 MMHG
BH CV ECHO MEAS - RV V1 MAX: 50 CM/SEC
BH CV ECHO MEAS - RV V1 VTI: 10.3 CM
BH CV ECHO MEAS - RVDD: 3.3 CM
BH CV ECHO MEAS - TR MAX PG: 35.1 MMHG
BH CV ECHO MEAS - TR MAX VEL: 296.1 CM/SEC

## 2024-08-09 PROCEDURE — 93306 TTE W/DOPPLER COMPLETE: CPT

## 2024-08-14 ENCOUNTER — TELEPHONE (OUTPATIENT)
Dept: CARDIOLOGY | Facility: CLINIC | Age: 74
End: 2024-08-14
Payer: MEDICARE

## 2024-08-14 NOTE — TELEPHONE ENCOUNTER
----- Message from Margarita Perez sent at 8/14/2024  9:14 AM EDT -----  Echocardiogram shows normal left ventricular systolic function 52%  There is mild aortic stenosis murmur noted, will monitor with repeat echo in 2-3 years. Follow up as scheduled

## 2024-08-19 RX ORDER — PRAVASTATIN SODIUM 10 MG
10 TABLET ORAL NIGHTLY
Qty: 90 TABLET | Refills: 3 | Status: SHIPPED | OUTPATIENT
Start: 2024-08-19

## 2024-09-04 RX ORDER — CELECOXIB 200 MG/1
200 CAPSULE ORAL DAILY
Qty: 90 CAPSULE | Refills: 3 | OUTPATIENT
Start: 2024-09-04

## 2024-09-12 ENCOUNTER — OFFICE VISIT (OUTPATIENT)
Dept: OTOLARYNGOLOGY | Facility: CLINIC | Age: 74
End: 2024-09-12
Payer: MEDICARE

## 2024-09-12 VITALS — TEMPERATURE: 97.3 F | BODY MASS INDEX: 30.11 KG/M2 | WEIGHT: 176.4 LBS | HEIGHT: 64 IN

## 2024-09-12 DIAGNOSIS — H61.23 HEARING LOSS DUE TO CERUMEN IMPACTION, BILATERAL: Primary | ICD-10-CM

## 2024-09-12 DIAGNOSIS — H69.02 PATULOUS EUSTACHIAN TUBE, LEFT: ICD-10-CM

## 2024-09-12 DIAGNOSIS — H69.93 CHRONIC EUSTACHIAN TUBE DYSFUNCTION, BILATERAL: ICD-10-CM

## 2024-09-12 RX ORDER — CIPROFLOXACIN AND DEXAMETHASONE 3; 1 MG/ML; MG/ML
5 SUSPENSION/ DROPS AURICULAR (OTIC) 2 TIMES DAILY
Qty: 7.5 ML | Refills: 2 | Status: SHIPPED | OUTPATIENT
Start: 2024-09-12 | End: 2024-09-19

## 2024-09-12 RX ORDER — AMOXICILLIN 500 MG/1
CAPSULE ORAL
COMMUNITY
Start: 2024-08-22

## 2024-09-12 NOTE — PROGRESS NOTES
Patient Name: Suzanna Santos   Visit Date: 09/12/2024   Patient ID: [id number]  Provider: Ag Pepe MD    Sex: female  Location: [facility]   YOB: 1950  Location Address: [address] [place] [address], [address] [place] [address],?[place]?[id number]    Primary Care Provider Joshua Lee MD  Location Phone: [phone]    Referring Provider: No ref. provider found        Chief Complaint  ear check    History of Present Illness  Suzanna Santos is a 73 y.o. female who presents to [facility] for ear check.  Patient was last seen by Dr. Pepe on 7/17/2023 at [facility] clinic with history of chronic otitis media and hearing loss.  She also has left ear patulous eustachian tube.  She underwent bilateral PE tube placements and nasopharyngoscopy on 8/30/2022.  Examination revealed a ventilation tubes in place and patent otherwise audiogram obtained on 6/15/2023 shows SRT of 25 on the right and 45 on the left.  Discrimination scores 93% on the right and 87% on the left.  Tympanograms are type B bilaterally consistent with patent ventilation tubes.  Pure-tone showed left moderate rising to mild mixed hearing loss in the right ear with mild conductive hearing loss.  Still complains of fullness in the left ear.  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 but the roof line required additrional  ablation   · 3/19 and was found to have an atypical rigfht atrial flutter, an 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 and a left atrial tachycardia near the AV node   · She developed recurrent SVT in the 130-150 range and was taken back to the lab    PONV (postoperative nausea and vomiting)     SVT (supraventricular tachycardia)     Thyroid disease     no med/cyst on thyroid       Past Surgical History:   Procedure Laterality Date    CARDIAC ABLATION      x4 - last 2019    CARDIAC CATHETERIZATION      CARDIAC ELECTROPHYSIOLOGY MAPPING AND ABLATION      CATARACT EXTRACTION, BILATERAL      CHOLECYSTECTOMY      COLONOSCOPY  02/08/2017    DR MANTILLA HISTORY OF COLON POLYPS    COLONOSCOPY  09/30/2021    dr mantilla    COLONOSCOPY N/A 09/30/2021    Procedure: COLONOSCOPY with BIOPSY/POLYPECTOMY COLD SNARE;  Surgeon: Tim Mantilla MD;  Location: [facility] ENDOSCOPY;  Service: Gastroenterology;  Laterality: N/A;  COLON POLYPS    COLONOSCOPY N/A 5/28/2024    Procedure: COLONOSCOPY;  Surgeon: Tim Mantilla MD;  Location: [facility] ENDOSCOPY;  Service: Gastroenterology;  Laterality: N/A;  HEMORRHOIDS    EAR TUBES Bilateral 08/30/2022    Procedure: BILATERAL MYRINGOTOMY WITH INSERTION OF EAR TUBES, NASOPHARYNGOSCOPY;  Surgeon: Ag Pepe MD;  Location: [facility] MAIN OR;  Service: ENT;  Laterality: Bilateral;    ENDOSCOPY  06/18/2018    DR MANTILLA    ENDOSCOPY N/A 12/13/2022    Procedure: ESOPHAGOGASTRODUODENOSCOPY WITH BIOPSIES;  Surgeon: Tim Mantilla MD;  Location: [facility] ENDOSCOPY;  Service: Gastroenterology;  Laterality: N/A;  HIATAL HERNIA    EYE SURGERY      Macular tear repair 3/1/2021 x2    HYSTERECTOMY      INSERT / REPLACE / REMOVE PACEMAKER      JOINT REPLACEMENT Right     total hip replacement     JOINT REPLACEMENT Bilateral     TKR    PACEMAKER REPLACEMENT      SIGMOIDOSCOPY  12/13/2022    Procedure: SIGMOIDOSCOPY FLEXIBLE;  Surgeon: Tim Mantilla MD;  Location: [facility] ENDOSCOPY;   Service: Gastroenterology;;  DIVERTICULOSIS    TONSILLECTOMY      VITRECTOMY PARS PLANA W/ REPAIR OF MACULAR HOLE           Current Outpatient Medications:     albuterol sulfate  (90 Base) MCG/ACT inhaler, 2 puffs Daily As Needed., Disp: , Rfl:     amoxicillin (AMOXIL) 500 MG capsule, TAKE 4 CAPSULES BY MOUTH 1 HOUR BEFORE DENTAL APPOINTMENT, Disp: , Rfl:     apixaban (Eliquis) 5 MG tablet tablet, Take 1 tablet by mouth Every 12 (Twelve) Hours., Disp: 180 tablet, Rfl: 3    Cholecalciferol 25 MCG (1000 UT) tablet, Take 1 tablet by mouth Daily., Disp: , Rfl:     famotidine (PEPCID) 20 MG tablet, Take 1 tablet by mouth Every Night., Disp: , Rfl:     Fluticasone-Umeclidin-Vilant (Trelegy Ellipta) 200-62.5-25 MCG/ACT aerosol powder , Inhale 1 puff Daily., Disp: 60 each, Rfl: 5    folic acid (FOLVITE) 1 MG tablet, Take 1 tablet by mouth Daily., Disp: , Rfl:     methocarbamol (ROBAXIN) 500 MG tablet, TAKE ONE TABLET BY MOUTH THREE TIMES A DAY IF NEEDED FOR MUSCLE SPASMS., Disp: 60 tablet, Rfl: 3    metoprolol tartrate (LOPRESSOR) 25 MG tablet, TAKE 1 TABLET BY MOUTH 2 (TWO) TIMES A DAY., Disp: 180 tablet, Rfl: 3    montelukast (Singulair) 10 MG tablet, Take 1 tablet by mouth Every Night., Disp: 30 tablet, Rfl: 5    olmesartan-hydrochlorothiazide (BENICAR HCT) 40-12.5 MG per tablet, TAKE 1 TABLET BY MOUTH DAILY., Disp: 90 tablet, Rfl: 3    Omeprazole Magnesium (PRILOSEC OTC PO), Take 20 mg by mouth Daily., Disp: , Rfl:     PARoxetine (PAXIL) 20 MG tablet, TAKE 1 TABLET BY MOUTH EVERY MORNING., Disp: 90 tablet, Rfl: 3    pravastatin (PRAVACHOL) 10 MG tablet, TAKE 1 TABLET BY MOUTH EVERY NIGHT., Disp: 90 tablet, Rfl: 3    saccharomyces boulardii (FLORASTOR) 250 MG capsule, Take 1 capsule by mouth 2 (Two) Times a Day., Disp: , Rfl:     traMADol (ULTRAM) 50 MG tablet, Take 1 tablet by mouth Every 6 (Six) Hours As Needed for Moderate Pain ., Disp: 90 tablet, Rfl: 2    vitamin B-6 (PYRIDOXINE) 50 MG tablet, Take 1 tablet  "by mouth Daily., Disp: , Rfl:     vitamin C (ASCORBIC ACID) 250 MG tablet, Take 1 tablet by mouth Daily., Disp: , Rfl:     cephalexin (Keflex) 500 MG capsule, Take 1 capsule by mouth 3 (Three) Times a Day., Disp: 21 capsule, Rfl: 0    ciprofloxacin-dexAMETHasone (CIPRODEX) 0.3-0.1 % otic suspension, Administer 5 drops into the left ear 2 (Two) Times a Day for 7 days., Disp: 7.5 mL, Rfl: 2     Allergies   Allergen Reactions    Doxycycline Hives     .    Lariam [Mefloquine] Hives     .    Lisinopril Hives     .    Talwin [Pentazocine] Hallucinations       Social History     Tobacco Use    Smoking status: Never     Passive exposure: Never    Smokeless tobacco: Never    Tobacco comments:     Grew up in smoking environm.   Vaping Use    Vaping status: Never Used   Substance Use Topics    Alcohol use: No    Drug use: Never        Objective     Vital Signs:   Vitals:    09/12/24 1426   Temp: 97.3 °F (36.3 °C)   TempSrc: Temporal   Weight: 80 kg (176 lb 6.4 oz)   Height: 162.6 cm (64.02\")       Tobacco Use: Low Risk  (9/12/2024)    Patient History     Smoking Tobacco Use: Never     Smokeless Tobacco Use: Never     Passive Exposure: Never         Physical Exam    Constitutional   Appearance  well developed, well-nourished, alert and in no acute distress, voice clear and strong    Head   Inspection  no deformities or lesions      Face   Inspection  no facial lesions; House-Brackmann I/VI bilaterally   Palpation  no TMJ crepitus nor  muscle tenderness bilaterally     Eyes/Vision   Visual Fields  extraocular movements are intact, no spontaneous or gaze-induced nystagmus  Conjunctivae  clear   Sclerae  clear   Pupils and Irises  pupils equal, round, and reactive to light.   Nystagmus  not present     Ears, Nose, Mouth and Throat  Ears  External Ears  appearance within normal limits, no lesions present   Otoscopic Examination  Bilateral ear canal cerumen impaction left ear with extruded ventilation tube sitting amongst " the wax impaction.  Hearing  intact to conversational voice both ears   Tunning fork testing    Rinne:  Yen:    Nose  External Nose  appearance normal   Intranasal Exam  mucosa within normal limits, vestibules normal, no intranasal lesions present, septum midline, sinuses non tender to percussion   Modified Kristy Test:    Oral Cavity  Oral Mucosa  oral mucosa normal without pallor or cyanosis   Lips  lip appearance normal   Teeth  normal dentition for age   Gums  gums pink, non-swollen, no bleeding present   Tongue  tongue appearance normal; normal mobility   Palate  hard palate normal, soft palate appearance normal with symmetric mobility     Throat  Oropharynx  no inflammation or lesions present, tonsils within normal limits   Hypopharynx  appearance within normal limits   Larynx  voice normal     Neck  Inspection/Palpation  normal appearance, no masses or tenderness, trachea midline; thyroid size normal, nontender, no nodules or masses present on palpation     Lymphatic  Neck  no lymphadenopathy present   Supraclavicular Nodes  no lymphadenopathy present   Preauricular Nodes  no lymphadenopathy present     Respiratory  Respiratory Effort  breathing unlabored   Inspection of Chest  normal appearance, no retractions     Musculoskeletal   Cervical back: Normal range of motion and neck supple.      Skin and Subcutaneous Tissue  General Inspection  Regarding face and neck - there are no rashes present, no lesions present, and no areas of discoloration     Neurologic  Cranial Nerves  cranial nerves II-XII are grossly intact bilaterally   Gait and Station  normal gait, able to stand without diffculty    Psychiatric  Judgement and Insight  judgment and insight intact   Mood and Affect  mood normal, affect appropriate       RESULTS REVIEWED    I have reviewed the following information:   [x]  Previous Internal Note  [x]  Previous External Note:   [x]  Ordered Tests & Results:      Pathology:   Lab Results   Component  "Value Date    CASEREPORT  12/13/2022     Surgical Pathology Report                         Case: [id number]                                  Authorizing Provider:  Tim Mixon MD    Collected:           12/13/2022 01:43 PM          Ordering Location:     [facility] Received:            12/14/2022 08:33 AM                                 SUITES                                                                       Pathologist:           Daniel Parisi MD                                                            Specimen:    Esophagus, Distal, DISTAL ESOPHAGUS BIOPSIES                                               CLININFO  12/13/2022     History of colon polyps  Dysphagia      FINALDX  12/13/2022     Distal esophagus, biopsy:    - Squamous mucosa with no significant pathologic change    - Negative for intestinal metaplasia, dysplasia and malignancy        GROSSDES  12/13/2022     1. Esophagus, Distal.  Received in formalin and labeled \" distal esophagus\" are two fragments of tan soft tissue measuring 0.4-0.5 cm in greatest dimension. The specimen is entirely submitted in one cassette.   RITA      MICRO  12/13/2022      Comment:      Microscopic examination performed.         TSH   Date Value Ref Range Status   06/10/2024 2.120 0.270 - 4.200 uIU/mL Final     Free T4   Date Value Ref Range Status   06/10/2024 1.31 0.92 - 1.68 ng/dL Final     Calcium   Date Value Ref Range Status   06/10/2024 9.6 8.6 - 10.5 mg/dL Final     25 Hydroxy, Vitamin D   Date Value Ref Range Status   09/25/2020 25.1 (L) 30.0 - 100.0 ng/mL Final     Comment:     Vitamin D Status          Range  ---------------------------------------  Deficiency                <10 ng/mL  Insufficiency             10-30 ng/mL  Sufficiency                ng/mL  Toxicity                  >100 ng/mL         XR Spine Lumbar Complete 4+VW    Result Date: 7/30/2024  1. Degenerative changes of the lumbar spine with dextroscoliotic " curvature, as described above. 2. Chronic appearing mild compression deformities of T12 and L2. 3. No acute findings. Electronically Signed: Therese Salter MD  7/30/2024 4:15 PM EDT  Workstation ID: [id number]      I have discussed the interpretation of the above results with the patient.    Ear Cerumen Removal    Date/Time: 9/12/2024 2:59 PM    Performed by: Ag Pepe MD  Authorized by: Ag Pepe MD    Anesthesia:  Local Anesthetic: none  Location details: right ear and left ear  Patient tolerance: patient tolerated the procedure well with no immediate complications  Comments: Under the microscope right ear canal cerumen impaction was cleaned.  Otherwise ventilation tube is still patent and in place.  Tympanic membrane appears unremarkable.  Attention was then directed to the left ear where it is her extensive cerumen impaction along with the ventilation tube that had been extruded was removed.  Patient has a granulation tissue right in the inferior aspect of the ear canal from probably ventilation tube foreign body reaction.  Otherwise little bit of bleeding was encountered but all the rest of the blood was evacuated with suction and Ciprodex drops were instilled.  Otherwise also left tympanic membrane is completely healed with an unremarkable without retraction.  Procedure type: instrumentation, alligator forceps, suction   Sedation:  Patient sedated: no                  Assessment and Plan   Diagnoses and all orders for this visit:    1. Hearing loss due to cerumen impaction, bilateral (Primary)  -     Ear Cerumen Removal    2. Chronic Eustachian tube dysfunction, bilateral  -     Ear Cerumen Removal  -     ciprofloxacin-dexAMETHasone (CIPRODEX) 0.3-0.1 % otic suspension; Administer 5 drops into the left ear 2 (Two) Times a Day for 7 days.  Dispense: 7.5 mL; Refill: 2    3. Patulous Eustachian tube, left        (H61.23) Hearing loss due to cerumen impaction, bilateral - Plan: Ear Cerumen  Removal    (H69.93) Chronic Eustachian tube dysfunction, bilateral - Plan: amoxicillin (AMOXIL) 500 MG capsule, Ear Cerumen Removal, ciprofloxacin-dexAMETHasone (CIPRODEX) 0.3-0.1 % otic suspension    (H69.02) Patulous Eustachian tube, left     Suzanna CHECO Santos  reports that she has never smoked. She has never been exposed to tobacco smoke. She has never used smokeless tobacco.       Plan:  Patient Instructions   1.  Patient was seen previously with eustachian tube dysfunction she had bilateral PE tubes placed.  However patient was thought to have patulous eustachian tube on the left ear.  2.  On today's examination she had bilateral cerumen impactions which were cleaned under microscope.  Otherwise right tympanic membrane has a ventilation tube in place and patent without drainage.  Left ear the ventilation tube is extruded and the tympanic membrane is unremarkable although she has some granulation tissue present in the ear canal.  3.  I am going to start patient on Ciprodex drops to the left ear only for about 1 week.  4.  I am going to see patient in 6 months for again another routine PE tube check.      Follow Up   Return in about 6 months (around 3/12/2025), or Follow-up 6 months for PE tube check.  Patient was given instructions and counseling regarding her condition or for health maintenance advice. Please see specific information pulled into the AVS if appropriate.

## 2024-09-12 NOTE — PATIENT INSTRUCTIONS
1.  Patient was seen previously with eustachian tube dysfunction she had bilateral PE tubes placed.  However patient was thought to have patulous eustachian tube on the left ear.  2.  On today's examination she had bilateral cerumen impactions which were cleaned under microscope.  Otherwise right tympanic membrane has a ventilation tube in place and patent without drainage.  Left ear the ventilation tube is extruded and the tympanic membrane is unremarkable although she has some granulation tissue present in the ear canal.  3.  I am going to start patient on Ciprodex drops to the left ear only for about 1 week.  4.  I am going to see patient in 6 months for again another routine PE tube check.

## 2024-09-23 ENCOUNTER — TELEPHONE (OUTPATIENT)
Dept: INTERNAL MEDICINE | Age: 74
End: 2024-09-23
Payer: MEDICARE

## 2024-09-24 RX ORDER — ATOVAQUONE AND PROGUANIL HYDROCHLORIDE 250; 100 MG/1; MG/1
1 TABLET, FILM COATED ORAL
Qty: 25 TABLET | Refills: 0 | Status: SHIPPED | OUTPATIENT
Start: 2024-09-24

## 2024-09-24 RX ORDER — AMOXICILLIN 500 MG/1
1000 CAPSULE ORAL 2 TIMES DAILY
Qty: 40 CAPSULE | Refills: 2 | Status: SHIPPED | OUTPATIENT
Start: 2024-09-24

## 2024-10-21 DIAGNOSIS — J45.901 MODERATE ASTHMA WITH ACUTE EXACERBATION, UNSPECIFIED WHETHER PERSISTENT: Primary | ICD-10-CM

## 2024-10-21 DIAGNOSIS — R05.3 CHRONIC COUGH: ICD-10-CM

## 2024-10-21 RX ORDER — MONTELUKAST SODIUM 10 MG/1
10 TABLET ORAL NIGHTLY
Qty: 30 TABLET | Refills: 1 | Status: SHIPPED | OUTPATIENT
Start: 2024-10-21

## 2024-10-23 ENCOUNTER — TELEPHONE (OUTPATIENT)
Dept: INTERNAL MEDICINE | Age: 74
End: 2024-10-23
Payer: MEDICARE

## 2024-10-23 DIAGNOSIS — M54.6 CHRONIC THORACIC BACK PAIN, UNSPECIFIED BACK PAIN LATERALITY: Primary | ICD-10-CM

## 2024-10-23 DIAGNOSIS — G89.29 CHRONIC THORACIC BACK PAIN, UNSPECIFIED BACK PAIN LATERALITY: Primary | ICD-10-CM

## 2024-10-23 NOTE — TELEPHONE ENCOUNTER
"Caller: Suzanna Santos \"Pat\"    Relationship: Self    Best call back number: [phone]     What is the medical concern/diagnosis: BACK PAIN    What specialty or service is being requested: NEUROLOGY    What is the provider, practice or medical service name: [facility]    What is the office location: [place]    What is the office phone number: [phone]   "

## 2024-11-06 ENCOUNTER — OFFICE VISIT (OUTPATIENT)
Dept: GASTROENTEROLOGY | Facility: CLINIC | Age: 74
End: 2024-11-06
Payer: MEDICARE

## 2024-11-06 VITALS
HEIGHT: 64 IN | WEIGHT: 172.4 LBS | SYSTOLIC BLOOD PRESSURE: 118 MMHG | BODY MASS INDEX: 29.43 KG/M2 | DIASTOLIC BLOOD PRESSURE: 73 MMHG | HEART RATE: 72 BPM

## 2024-11-06 DIAGNOSIS — K22.0 SPASM OF THE CRICOPHARYNGEUS MUSCLE: ICD-10-CM

## 2024-11-06 DIAGNOSIS — R13.14 PHARYNGOESOPHAGEAL DYSPHAGIA: Primary | ICD-10-CM

## 2024-11-06 DIAGNOSIS — K64.8 INTERNAL HEMORRHOIDS: ICD-10-CM

## 2024-11-06 DIAGNOSIS — K22.4 ESOPHAGEAL SPASM: ICD-10-CM

## 2024-11-06 NOTE — PROGRESS NOTES
Patient Name: Suzanna Santos   Visit Date: 11/06/2024   Patient ID: [id number]  Provider: ROSHAN Hebert    Sex: female  Location:  Location Address:  Location Phone: [phone]  [place] [address] [phone]    YOB: 1950  Age: 73 y.o.   Primary Care Provider Joshua Lee MD      Referring Provider: Joshua Lee, *        Chief Complaint  Difficulty Swallowing (Trouble with solids and liquids, some days )    History of Present Illness  Pt not seen since 2021. Hx HB , dysphagia, and pancreatic cyst, last CT w pancreatic protocol 2020 did not note any pancreatic cyst    Pt requested to add EGD for dysphagia to colonoscopy in 2022  EGD 12/13/22 - hiatal hernia otherwise negative , esophageal bx negative    Esophagram w modified barium swallow 1/27/23:   IMPRESSION:  1. Prominent cricopharyngeal muscle likely due to impaired relaxation producing an AP diameter narrowing of 46%  2. Presbyesophagus  3. Moderate spontaneous gastroesophageal reflux   4. One episode of shallow, transient laryngeal penetration with thin liquid barium via straw trial   Offered pt referral to ENT , or trial CCB but pt declined.     Screening Colonoscopy 5/28/2024: Good prep, internal hemorrhoids grade 1, the sigmoid colon was torturous    Pt opted not to f/u with us after testing done for dysphagia 2022/2023, but presents today with continued complaints. Pt states peanut butter does not go down well and this can be uncomfortable otherwise no pain w swallowing, rarely has HB. Sx are a couple times / week.   She gets choked occasionally as well, she does not perform double swallows, does eat fast and talks while she eats. She did not remember recommendations from swallowing study  Pt states sx are not worse or different than when she had EGD and barium swallow.   No other GI c/o today  Past Medical History:   Diagnosis Date    Anemia     Arthritis     Asthma     inhaler prn    Benign  essential hypertension     Cancer     skin    Coronary artery disease     COVID-19 long hauler manifesting chronic fatigue 01/16/2023    Diverticulosis     Eustachian tube dysfunction, bilateral 08/17/2022    GERD (gastroesophageal reflux disease)     Glaucoma     History of degenerative disc disease     Hypertension     Neuropathy     Pacemaker     st julius     Paroxysmal atrial fibrillation 07/13/2019     ·  ablation in 2008  afib  · Repeat fib ablation '17 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 but the roof line required additrional ablation   · 3/19 and was found to have an atypical rigfht atrial flutter, an 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 and a left atrial tachycardia near the AV node   · She developed recurrent SVT in the 130-150 range and was taken back to the lab    PONV (postoperative nausea and vomiting)     SVT (supraventricular tachycardia)     Thyroid disease     no med/cyst on thyroid       Past Surgical History:   Procedure Laterality Date    CARDIAC ABLATION      x4 - last 2019    CARDIAC CATHETERIZATION      CARDIAC ELECTROPHYSIOLOGY MAPPING AND ABLATION      CATARACT EXTRACTION, BILATERAL      CHOLECYSTECTOMY      COLONOSCOPY  02/08/2017    DR MANTILLA HISTORY OF COLON POLYPS    COLONOSCOPY  09/30/2021    dr mantilla    COLONOSCOPY N/A 09/30/2021    Procedure: COLONOSCOPY with BIOPSY/POLYPECTOMY COLD SNARE;  Surgeon: Tim Mantilla MD;  Location: [facility] ENDOSCOPY;  Service: Gastroenterology;  Laterality: N/A;  COLON POLYPS    COLONOSCOPY N/A 5/28/2024    Procedure: COLONOSCOPY;  Surgeon: Tim Mantilla MD;  Location: [facility] ENDOSCOPY;  Service: Gastroenterology;  Laterality: N/A;  HEMORRHOIDS    EAR TUBES Bilateral 08/30/2022    Procedure: BILATERAL MYRINGOTOMY WITH INSERTION OF EAR TUBES, NASOPHARYNGOSCOPY;  Surgeon: Ag Pepe MD;  Location: [facility] MAIN OR;  Service: ENT;  Laterality: Bilateral;    ENDOSCOPY  06/18/2018    DR MANTILLA    ENDOSCOPY N/A  "12/13/2022    Procedure: ESOPHAGOGASTRODUODENOSCOPY WITH BIOPSIES;  Surgeon: Tim Mixon MD;  Location: [facility] ENDOSCOPY;  Service: Gastroenterology;  Laterality: N/A;  HIATAL HERNIA    EYE SURGERY      Macular tear repair 3/1/2021 x2    HYSTERECTOMY      INSERT / REPLACE / REMOVE PACEMAKER      JOINT REPLACEMENT Right     total hip replacement     JOINT REPLACEMENT Bilateral     TKR    PACEMAKER REPLACEMENT      SIGMOIDOSCOPY  12/13/2022    Procedure: SIGMOIDOSCOPY FLEXIBLE;  Surgeon: Tim Mixon MD;  Location: [facility] ENDOSCOPY;  Service: Gastroenterology;;  DIVERTICULOSIS    TONSILLECTOMY      VITRECTOMY PARS PLANA W/ REPAIR OF MACULAR HOLE         Allergies   Allergen Reactions    Doxycycline Hives     .    Lariam [Mefloquine] Hives     .    Lisinopril Hives     .    Talwin [Pentazocine] Hallucinations       Family History   Problem Relation Age of Onset    Heart attack Mother     Heart disease Mother     Diabetes Mother     Arthritis Mother     Arrhythmia Mother     Heart attack Father     Coronary artery disease Father     Heart disease Father     Diabetes Father     Colon cancer Brother 30    Rectal cancer Brother 30    Heart disease Brother     Diabetes Brother     Arthritis Brother     Malig Hyperthermia Neg Hx         Social History     Tobacco Use    Smoking status: Never     Passive exposure: Never    Smokeless tobacco: Never    Tobacco comments:     Grew up in smoking environm.   Vaping Use    Vaping status: Never Used   Substance Use Topics    Alcohol use: No    Drug use: Never       Objective     Vital Signs:   /73 (BP Location: Right arm, Patient Position: Sitting, Cuff Size: Adult)   Pulse 72   Ht 162.6 cm (64.02\")   Wt 78.2 kg (172 lb 6.4 oz)   BMI 29.57 kg/m²       Physical Exam  Constitutional:       General: The patient is not in acute distress.     Appearance: Normal appearance.   HENT:      Head: Normocephalic and atraumatic.      Nose: Nose normal.   Pulmonary:      " Effort: Pulmonary effort is normal. No respiratory distress.   Abdominal:      General: Abdomen is flat.      Palpations: Abdomen is soft. There is no mass.      Tenderness: There is no abdominal tenderness. There is no guarding.   Musculoskeletal:      Cervical back: Neck supple.      Right lower leg: No edema.      Left lower leg: No edema.   Skin:     General: Skin is warm and dry.   Neurological:      General: No focal deficit present.      Mental Status: The patient is alert and oriented to person, place, and time.      Gait: Gait normal.   Psychiatric:         Mood and Affect: Mood normal.         Speech: Speech normal.         Behavior: Behavior normal.         Thought Content: Thought content normal.     Result Review :   The following data was reviewed by: ROSHAN Hebert on 11/06/2024:                    Assessment and Plan    Diagnoses and all orders for this visit:    1. Pharyngoesophageal dysphagia (Primary)    2. Spasm of the cricopharyngeus muscle  Comments:  prominent cricopharyngeal muscle producing 46% narrowing    3. Esophageal spasm    4. Internal hemorrhoids              Follow Up   Return if symptoms worsen or fail to improve.  Patient denies that symptoms are worse or different than when testing performed late 2022, discussed repeating EGD but she declined for now.  We reviewed lifestyle modifications for dysphagia, patient states she will adopt double swallow and alternate liquids and solids and chew food thoroughly.  I also recommend ENT consult for prominent cricopharyngeus muscle, patient states she has an appointment coming up with Dr. Pepe and she will discuss with him.  Offered calcium channel blocker for esophageal spasms but she declined for now, she request to follow-up as needed.    Patient was given instructions and counseling regarding her condition or for health maintenance advice. Please see specific information pulled into the AVS if appropriate.

## 2024-11-07 ENCOUNTER — OFFICE VISIT (OUTPATIENT)
Dept: NEUROSURGERY | Facility: CLINIC | Age: 74
End: 2024-11-07
Payer: MEDICARE

## 2024-11-07 VITALS
HEIGHT: 64 IN | WEIGHT: 173.7 LBS | DIASTOLIC BLOOD PRESSURE: 78 MMHG | HEART RATE: 70 BPM | SYSTOLIC BLOOD PRESSURE: 116 MMHG | BODY MASS INDEX: 29.65 KG/M2

## 2024-11-07 DIAGNOSIS — M41.9 SCOLIOSIS OF LUMBAR SPINE, UNSPECIFIED SCOLIOSIS TYPE: ICD-10-CM

## 2024-11-07 DIAGNOSIS — G89.29 CHRONIC BILATERAL LOW BACK PAIN WITHOUT SCIATICA: ICD-10-CM

## 2024-11-07 DIAGNOSIS — M47.816 LUMBAR FACET ARTHROPATHY: ICD-10-CM

## 2024-11-07 DIAGNOSIS — M51.360 DEGENERATION OF INTERVERTEBRAL DISC OF LUMBAR REGION WITH DISCOGENIC BACK PAIN: Primary | ICD-10-CM

## 2024-11-07 DIAGNOSIS — M54.50 CHRONIC BILATERAL LOW BACK PAIN WITHOUT SCIATICA: ICD-10-CM

## 2024-11-07 NOTE — PROGRESS NOTES
"Chief Complaint  Establish Care (Back pain has been happening for about a month now does not know the cause of it /Pain in lower back, worst pain is more on the R side )    Subjective          Suzanna Santos who is a 73 y.o. year old female who presents to [facility] for Evaluation of the Spine.     The patient complains of pain located in the Lumbar Spine.  Patients states the pain has been present for many years.  The pain came on gradually.  She reports a flare-up of pain starting on October 2nd. The pain scaled level is 2.  The pain  mostly is in the back, but at its worst it extends to the hips and groins worse on the right .  The pain is Intermittent and described as sharp, dull, and aching.  The pain is worse at no particular time of day. Patient states Rest and Ice makes the pain better.  Patient states Prolonged Standing, Prolonged Walking, Bending, and \"raising up\", lifting the right leg makes the pain worse.    Associated Symptoms Include: Denies Numbness, Tingling, Weakness, and Loss of Bowel or Bladder Control  Conservative Interventions Include: Physical Therapy that was done for previous flareups was effective, she has not tried since this flareup.    Was this the result of an injury or accident? : No    History of Previous Spinal Surgery?: No     reports that she has never smoked. She has never been exposed to tobacco smoke. She has never used smokeless tobacco.    Review of Systems   Musculoskeletal:  Positive for back pain.        Objective   Vital Signs:   /78   Pulse 70   Ht 162.6 cm (64.02\")   Wt 78.8 kg (173 lb 11.2 oz)   BMI 29.80 kg/m²       Physical Exam  Constitutional:       Appearance: Normal appearance.   Pulmonary:      Effort: Pulmonary effort is normal.   Musculoskeletal:         General: Tenderness (right lumbar area) present.      Comments: SLR negative bilaterally   Neurological:      General: No focal deficit present.      " Mental Status: She is alert and oriented to person, place, and time.      Sensory: No sensory deficit.      Motor: No weakness.      Deep Tendon Reflexes: Reflexes normal.   Psychiatric:         Mood and Affect: Mood normal.         Behavior: Behavior normal.        Neurological Exam  Mental Status  Alert. Oriented to person, place, and time.      Result Review     I have personally interpreted the x-ray of the lumbar spine from 7/30/2024 which shows degenerative changes with scoliosis.  There are chronic mild compression deformities of T12 and L2.  There is facet arthritis more severe at L4-L5 and L5-S1.     Assessment and Plan    Diagnoses and all orders for this visit:    1. Degeneration of intervertebral disc of lumbar region with discogenic back pain (Primary)  -     MRI Lumbar Spine Without Contrast; Future    2. Lumbar facet arthropathy  -     MRI Lumbar Spine Without Contrast; Future    3. Scoliosis of lumbar spine, unspecified scoliosis type  -     MRI Lumbar Spine Without Contrast; Future    4. Chronic bilateral low back pain without sciatica  -     MRI Lumbar Spine Without Contrast; Future    She has pain in the back. There is multilevel spondylosis on x-ray in the lumbar spine, which likely explains her pain.    She may consider conservative treatment with PT.    She may consider a pain management consultation.    I will order and MRI of the lumbar spine without contrast to assess the lumbar spine pathology further.    The patient was counseled on basic recommendations for the reduction and prevention of back, neck, or spine pain in association with spinal disorders, including: cessation/avoidance of nicotine use, maintenance of a healthy BMI and weight, focusing on building/maintaining core strength through core exercise, and avoidance of activities which worsen the pain. The patient will monitor for changes in symptoms and notify our clinic of these changes as needed.      Follow Up   Return in about 4  weeks (around 12/5/2024).  Patient was given instructions and counseling regarding her condition or for health maintenance advice. Please see specific information pulled into the AVS if appropriate.

## 2024-11-08 ENCOUNTER — PATIENT ROUNDING (BHMG ONLY) (OUTPATIENT)
Dept: NEUROSURGERY | Facility: CLINIC | Age: 74
End: 2024-11-08
Payer: MEDICARE

## 2024-11-20 ENCOUNTER — EXTERNAL PBMM DATA (OUTPATIENT)
Dept: PHARMACY | Facility: OTHER | Age: 74
End: 2024-11-20
Payer: MEDICARE

## 2024-12-05 ENCOUNTER — HOSPITAL ENCOUNTER (OUTPATIENT)
Dept: MRI IMAGING | Facility: HOSPITAL | Age: 74
Discharge: HOME | End: 2024-12-05
Admitting: PHYSICIAN ASSISTANT
Payer: MEDICARE

## 2024-12-05 VITALS — OXYGEN SATURATION: 95 % | SYSTOLIC BLOOD PRESSURE: 160 MMHG | DIASTOLIC BLOOD PRESSURE: 64 MMHG | HEART RATE: 60 BPM

## 2024-12-05 DIAGNOSIS — G89.29 CHRONIC BILATERAL LOW BACK PAIN WITHOUT SCIATICA: ICD-10-CM

## 2024-12-05 DIAGNOSIS — M47.816 LUMBAR FACET ARTHROPATHY: ICD-10-CM

## 2024-12-05 DIAGNOSIS — M54.50 CHRONIC BILATERAL LOW BACK PAIN WITHOUT SCIATICA: ICD-10-CM

## 2024-12-05 DIAGNOSIS — M51.360 DEGENERATION OF INTERVERTEBRAL DISC OF LUMBAR REGION WITH DISCOGENIC BACK PAIN: ICD-10-CM

## 2024-12-05 DIAGNOSIS — M41.9 SCOLIOSIS OF LUMBAR SPINE, UNSPECIFIED SCOLIOSIS TYPE: ICD-10-CM

## 2024-12-05 PROCEDURE — 72148 MRI LUMBAR SPINE W/O DYE: CPT

## 2024-12-05 NOTE — NURSING NOTE
Arrived for MRI lumbar study. Vitals taken per MRI tech prior to pacemaker safe mode conversion per rep.    Vitals stable, monitored/documented per RN during MRI study.  Upon completion of study, rep converted pacemaker out of safe mode.

## 2024-12-07 DIAGNOSIS — J22 LOWER RESPIRATORY INFECTION (E.G., BRONCHITIS, PNEUMONIA, PNEUMONITIS, PULMONITIS): ICD-10-CM

## 2024-12-09 RX ORDER — IPRATROPIUM BROMIDE AND ALBUTEROL SULFATE 2.5; .5 MG/3ML; MG/3ML
SOLUTION RESPIRATORY (INHALATION)
Qty: 360 ML | Refills: 1 | OUTPATIENT
Start: 2024-12-09

## 2024-12-09 RX ORDER — OLMESARTAN MEDOXOMIL AND HYDROCHLOROTHIAZIDE 40/12.5 40; 12.5 MG/1; MG/1
1 TABLET ORAL DAILY
Qty: 90 TABLET | Refills: 3 | Status: SHIPPED | OUTPATIENT
Start: 2024-12-09

## 2024-12-10 ENCOUNTER — OFFICE VISIT (OUTPATIENT)
Dept: NEUROSURGERY | Facility: CLINIC | Age: 74
End: 2024-12-10
Payer: MEDICARE

## 2024-12-10 ENCOUNTER — POP HEALTH PHARMACY (OUTPATIENT)
Dept: PHARMACY | Facility: OTHER | Age: 74
End: 2024-12-10
Payer: MEDICARE

## 2024-12-10 VITALS
BODY MASS INDEX: 30.03 KG/M2 | WEIGHT: 175.9 LBS | SYSTOLIC BLOOD PRESSURE: 166 MMHG | HEIGHT: 64 IN | DIASTOLIC BLOOD PRESSURE: 85 MMHG | HEART RATE: 73 BPM

## 2024-12-10 DIAGNOSIS — G89.29 CHRONIC BILATERAL LOW BACK PAIN WITHOUT SCIATICA: ICD-10-CM

## 2024-12-10 DIAGNOSIS — S32.040A CLOSED COMPRESSION FRACTURE OF L4 LUMBAR VERTEBRA, INITIAL ENCOUNTER: ICD-10-CM

## 2024-12-10 DIAGNOSIS — M47.816 LUMBAR FACET ARTHROPATHY: ICD-10-CM

## 2024-12-10 DIAGNOSIS — M51.360 DEGENERATION OF INTERVERTEBRAL DISC OF LUMBAR REGION WITH DISCOGENIC BACK PAIN: ICD-10-CM

## 2024-12-10 DIAGNOSIS — S32.030A COMPRESSION FRACTURE OF L3 LUMBAR VERTEBRA, CLOSED, INITIAL ENCOUNTER: Primary | ICD-10-CM

## 2024-12-10 DIAGNOSIS — M54.50 CHRONIC BILATERAL LOW BACK PAIN WITHOUT SCIATICA: ICD-10-CM

## 2024-12-10 NOTE — PROGRESS NOTES
Patient being seen for today for Follow-up (4 week- Lumbar MRI 12/5/24)  .    Subjective    Suzanna Santos is a 73 y.o. female that presents with Follow-up (4 week- Lumbar MRI 12/5/24)  .    HPI  Previously: Last seen on 11/7/2024 for pain primarily in the back.  We discussed that multilevel spondylosis on x-ray most likely explain her back pain.  She did report at times of her worst pain the pain does extend into the hips in the groin on the right especially.  We discussed consideration for physical therapy and pain management consultation.  There was an order for MRI of the lumbar spine to assess the spinal pathology further.    Today: She report continued pain, mostly in the right lower back. She reports some improved pain.      reports that she has never smoked. She has never been exposed to tobacco smoke. She has never used smokeless tobacco.    Review of Systems   Musculoskeletal:  Positive for back pain.       Objective   Vitals:    12/10/24 1141   BP: 166/85   Pulse: 73        Physical Exam  Constitutional:       Appearance: Normal appearance.   Pulmonary:      Effort: Pulmonary effort is normal.   Musculoskeletal:         General: No tenderness.      Comments: SLR negative bilaterally   Neurological:      General: No focal deficit present.      Mental Status: She is alert and oriented to person, place, and time.      Sensory: No sensory deficit.      Motor: No weakness.      Deep Tendon Reflexes: Reflexes normal.   Psychiatric:         Mood and Affect: Mood normal.         Behavior: Behavior normal.          Result Review   I have personally interpreted the MRI of the lumbar spine without contrast from 12/5/2024 which shows superior endplate compression deformities at L3 and L4 with approximately 20% height loss and edema visible in the STIR image at both levels indicating acute to subacute injury.  Otherwise there is multilevel degenerative disc disease and facet arthritis.  There is no high-grade  central canal narrowing producing nerve root compression in the central canal.  There is mild to moderate multilevel foraminal narrowing probably worse on the right at L4-L5 and worse on the left at L2-L3 and L3-L4.     Assessment and Plan {CC Problem List  Visit Diagnosis  ROS  Review (Popup)  [facility]  BestPractice  Medications  SmartSets  SnapShot Encounters  Media :23}   Diagnoses and all orders for this visit:    1. Compression fracture of L3 lumbar vertebra, closed, initial encounter (Primary)  -     XR Spine Lumbar 2 or 3 View; Future    2. Closed compression fracture of L4 lumbar vertebra, initial encounter  -     XR Spine Lumbar 2 or 3 View; Future    3. Degeneration of intervertebral disc of lumbar region with discogenic back pain    4. Lumbar facet arthropathy    5. Chronic bilateral low back pain without sciatica    She has an L3 and L4 fracture with 20% height loss and active edema on STIR image.    Upon further discussion, she does report twisting the back approximately 2 months ago when her pain started.    We discussed back bracing, she is deferring TLSO bracing, but she will use an OTC brace she already has.    I am recommending the following restrictions: No heavy lifting greater than 5 lb, limit twisting and bending at the waist, avoidance of strenuous activity.    She will continue with these restrictions for a minimum of 4 weeks.    She will follow-up in 4 weeks with x-ray 2-3 days in advance to monitor the L3 and L4 fractures.    Follow Up {Instructions Charge Capture  Follow-up Communications :23}   Return in about 4 weeks (around 1/7/2025).

## 2024-12-11 ENCOUNTER — LAB (OUTPATIENT)
Dept: LAB | Facility: HOSPITAL | Age: 74
End: 2024-12-11
Payer: MEDICARE

## 2024-12-11 DIAGNOSIS — R05.1 ACUTE COUGH: ICD-10-CM

## 2024-12-11 DIAGNOSIS — G93.32 COVID-19 LONG HAULER MANIFESTING CHRONIC FATIGUE: ICD-10-CM

## 2024-12-11 DIAGNOSIS — M19.90 ARTHRITIS: ICD-10-CM

## 2024-12-11 DIAGNOSIS — R01.1 CARDIAC MURMUR, UNSPECIFIED: ICD-10-CM

## 2024-12-11 DIAGNOSIS — E78.2 MIXED HYPERLIPIDEMIA: ICD-10-CM

## 2024-12-11 DIAGNOSIS — I10 ESSENTIAL HYPERTENSION: ICD-10-CM

## 2024-12-11 DIAGNOSIS — F41.1 ANXIETY, GENERALIZED: ICD-10-CM

## 2024-12-11 DIAGNOSIS — I48.11 LONGSTANDING PERSISTENT ATRIAL FIBRILLATION: ICD-10-CM

## 2024-12-11 DIAGNOSIS — U09.9 COVID-19 LONG HAULER MANIFESTING CHRONIC FATIGUE: ICD-10-CM

## 2024-12-11 DIAGNOSIS — E55.9 VITAMIN D DEFICIENCY: ICD-10-CM

## 2024-12-11 DIAGNOSIS — J45.41 MODERATE PERSISTENT ASTHMA WITH ACUTE EXACERBATION: ICD-10-CM

## 2024-12-11 DIAGNOSIS — Z98.890 H/O CARDIAC RADIOFREQUENCY ABLATION: ICD-10-CM

## 2024-12-11 DIAGNOSIS — J22 LOWER RESPIRATORY INFECTION (E.G., BRONCHITIS, PNEUMONIA, PNEUMONITIS, PULMONITIS): ICD-10-CM

## 2024-12-11 DIAGNOSIS — D50.9 IRON DEFICIENCY ANEMIA, UNSPECIFIED IRON DEFICIENCY ANEMIA TYPE: ICD-10-CM

## 2024-12-11 DIAGNOSIS — K21.9 GASTRO-ESOPHAGEAL REFLUX DISEASE WITHOUT ESOPHAGITIS: ICD-10-CM

## 2024-12-11 LAB
ALBUMIN SERPL-MCNC: 3.9 G/DL (ref 3.5–5.2)
ALBUMIN/GLOB SERPL: 1.4 G/DL
ALP SERPL-CCNC: 71 U/L (ref 39–117)
ALT SERPL W P-5'-P-CCNC: 16 U/L (ref 1–33)
ANION GAP SERPL CALCULATED.3IONS-SCNC: 13.3 MMOL/L (ref 5–15)
AST SERPL-CCNC: 19 U/L (ref 1–32)
BASOPHILS # BLD AUTO: 0.09 10*3/MM3 (ref 0–0.2)
BASOPHILS NFR BLD AUTO: 0.9 % (ref 0–1.5)
BILIRUB SERPL-MCNC: 1.2 MG/DL (ref 0–1.2)
BUN SERPL-MCNC: 12 MG/DL (ref 8–23)
BUN/CREAT SERPL: 12.5 (ref 7–25)
CALCIUM SPEC-SCNC: 9.7 MG/DL (ref 8.6–10.5)
CHLORIDE SERPL-SCNC: 100 MMOL/L (ref 98–107)
CHOLEST SERPL-MCNC: 138 MG/DL (ref 0–200)
CO2 SERPL-SCNC: 23.7 MMOL/L (ref 22–29)
CREAT SERPL-MCNC: 0.96 MG/DL (ref 0.57–1)
DEPRECATED RDW RBC AUTO: 39 FL (ref 37–54)
EGFRCR SERPLBLD CKD-EPI 2021: 61.8 ML/MIN/1.73
EOSINOPHIL # BLD AUTO: 0.34 10*3/MM3 (ref 0–0.4)
EOSINOPHIL NFR BLD AUTO: 3.4 % (ref 0.3–6.2)
ERYTHROCYTE [DISTWIDTH] IN BLOOD BY AUTOMATED COUNT: 17.9 % (ref 12.3–15.4)
GLOBULIN UR ELPH-MCNC: 2.8 GM/DL
GLUCOSE SERPL-MCNC: 106 MG/DL (ref 65–99)
HCT VFR BLD AUTO: 33.7 % (ref 34–46.6)
HDLC SERPL-MCNC: 38 MG/DL (ref 40–60)
HGB BLD-MCNC: 10.2 G/DL (ref 12–15.9)
IMM GRANULOCYTES # BLD AUTO: 0.04 10*3/MM3 (ref 0–0.05)
IMM GRANULOCYTES NFR BLD AUTO: 0.4 % (ref 0–0.5)
LDLC SERPL CALC-MCNC: 84 MG/DL (ref 0–100)
LDLC/HDLC SERPL: 2.22 {RATIO}
LYMPHOCYTES # BLD AUTO: 0.9 10*3/MM3 (ref 0.7–3.1)
LYMPHOCYTES NFR BLD AUTO: 9.1 % (ref 19.6–45.3)
MCH RBC QN AUTO: 19.7 PG (ref 26.6–33)
MCHC RBC AUTO-ENTMCNC: 30.3 G/DL (ref 31.5–35.7)
MCV RBC AUTO: 65.1 FL (ref 79–97)
MONOCYTES # BLD AUTO: 0.85 10*3/MM3 (ref 0.1–0.9)
MONOCYTES NFR BLD AUTO: 8.6 % (ref 5–12)
NEUTROPHILS NFR BLD AUTO: 7.67 10*3/MM3 (ref 1.7–7)
NEUTROPHILS NFR BLD AUTO: 77.6 % (ref 42.7–76)
PLATELET # BLD AUTO: 243 10*3/MM3 (ref 140–450)
PMV BLD AUTO: 11 FL (ref 6–12)
POTASSIUM SERPL-SCNC: 4.1 MMOL/L (ref 3.5–5.2)
PROT SERPL-MCNC: 6.7 G/DL (ref 6–8.5)
RBC # BLD AUTO: 5.18 10*6/MM3 (ref 3.77–5.28)
SODIUM SERPL-SCNC: 137 MMOL/L (ref 136–145)
T4 FREE SERPL-MCNC: 1.74 NG/DL (ref 0.92–1.68)
TRIGL SERPL-MCNC: 79 MG/DL (ref 0–150)
TSH SERPL DL<=0.05 MIU/L-ACNC: 1.68 UIU/ML (ref 0.27–4.2)
VLDLC SERPL-MCNC: 16 MG/DL (ref 5–40)
WBC NRBC COR # BLD AUTO: 9.89 10*3/MM3 (ref 3.4–10.8)

## 2024-12-11 PROCEDURE — 80053 COMPREHEN METABOLIC PANEL: CPT

## 2024-12-11 PROCEDURE — 36415 COLL VENOUS BLD VENIPUNCTURE: CPT

## 2024-12-11 PROCEDURE — 84443 ASSAY THYROID STIM HORMONE: CPT

## 2024-12-11 PROCEDURE — 84439 ASSAY OF FREE THYROXINE: CPT

## 2024-12-11 PROCEDURE — 85025 COMPLETE CBC W/AUTO DIFF WBC: CPT

## 2024-12-11 PROCEDURE — 80061 LIPID PANEL: CPT

## 2024-12-11 RX ORDER — IPRATROPIUM BROMIDE AND ALBUTEROL SULFATE 2.5; .5 MG/3ML; MG/3ML
SOLUTION RESPIRATORY (INHALATION)
Qty: 360 ML | Refills: 1 | OUTPATIENT
Start: 2024-12-11

## 2024-12-11 RX ORDER — FLUTICASONE FUROATE, UMECLIDINIUM BROMIDE AND VILANTEROL TRIFENATATE 200; 62.5; 25 UG/1; UG/1; UG/1
1 POWDER RESPIRATORY (INHALATION) DAILY
Qty: 60 EACH | Refills: 5 | Status: SHIPPED | OUTPATIENT
Start: 2024-12-11

## 2024-12-18 ENCOUNTER — LAB (OUTPATIENT)
Dept: LAB | Facility: HOSPITAL | Age: 74
End: 2024-12-18
Payer: MEDICARE

## 2024-12-18 ENCOUNTER — OFFICE VISIT (OUTPATIENT)
Dept: INTERNAL MEDICINE | Age: 74
End: 2024-12-18
Payer: MEDICARE

## 2024-12-18 VITALS
SYSTOLIC BLOOD PRESSURE: 148 MMHG | HEART RATE: 71 BPM | OXYGEN SATURATION: 97 % | WEIGHT: 170 LBS | TEMPERATURE: 98.2 F | HEIGHT: 64 IN | BODY MASS INDEX: 29.02 KG/M2 | DIASTOLIC BLOOD PRESSURE: 78 MMHG

## 2024-12-18 DIAGNOSIS — Z98.890 H/O CARDIAC RADIOFREQUENCY ABLATION: ICD-10-CM

## 2024-12-18 DIAGNOSIS — K21.9 GASTRO-ESOPHAGEAL REFLUX DISEASE WITHOUT ESOPHAGITIS: ICD-10-CM

## 2024-12-18 DIAGNOSIS — I48.0 PAF (PAROXYSMAL ATRIAL FIBRILLATION): ICD-10-CM

## 2024-12-18 DIAGNOSIS — E78.2 MIXED HYPERLIPIDEMIA: ICD-10-CM

## 2024-12-18 DIAGNOSIS — M19.90 ARTHRITIS: ICD-10-CM

## 2024-12-18 DIAGNOSIS — J45.41 MODERATE PERSISTENT ASTHMA WITH ACUTE EXACERBATION: ICD-10-CM

## 2024-12-18 DIAGNOSIS — D50.9 IRON DEFICIENCY ANEMIA, UNSPECIFIED IRON DEFICIENCY ANEMIA TYPE: ICD-10-CM

## 2024-12-18 DIAGNOSIS — H40.1110 PRIMARY OPEN ANGLE GLAUCOMA OF RIGHT EYE, UNSPECIFIED GLAUCOMA STAGE: ICD-10-CM

## 2024-12-18 DIAGNOSIS — M80.08XD FRACTURE OF VERTEBRA DUE TO OSTEOPOROSIS WITH ROUTINE HEALING, SUBSEQUENT ENCOUNTER: ICD-10-CM

## 2024-12-18 DIAGNOSIS — E55.9 VITAMIN D DEFICIENCY: ICD-10-CM

## 2024-12-18 DIAGNOSIS — Z86.0100 HISTORY OF COLON POLYPS: ICD-10-CM

## 2024-12-18 DIAGNOSIS — H43.819 VITREOUS DEGENERATION, UNSPECIFIED LATERALITY: ICD-10-CM

## 2024-12-18 DIAGNOSIS — E04.1 THYROID NODULE GREATER THAN OR EQUAL TO 1 CM IN DIAMETER INCIDENTALLY NOTED ON IMAGING STUDY: ICD-10-CM

## 2024-12-18 DIAGNOSIS — Z00.00 MEDICARE ANNUAL WELLNESS VISIT, SUBSEQUENT: Primary | ICD-10-CM

## 2024-12-18 DIAGNOSIS — F33.1 MAJOR DEPRESSIVE DISORDER, RECURRENT, MODERATE: ICD-10-CM

## 2024-12-18 DIAGNOSIS — I10 ESSENTIAL HYPERTENSION: ICD-10-CM

## 2024-12-18 DIAGNOSIS — I35.1 MILD AORTIC REGURGITATION: ICD-10-CM

## 2024-12-18 DIAGNOSIS — Z00.00 MEDICARE ANNUAL WELLNESS VISIT, SUBSEQUENT: ICD-10-CM

## 2024-12-18 DIAGNOSIS — F41.1 ANXIETY, GENERALIZED: ICD-10-CM

## 2024-12-18 LAB
FERRITIN SERPL-MCNC: 58.6 NG/ML (ref 13–150)
IRON 24H UR-MRATE: 78 MCG/DL (ref 37–145)
IRON SATN MFR SERPL: 16 % (ref 20–50)
TIBC SERPL-MCNC: 477 MCG/DL (ref 298–536)
TRANSFERRIN SERPL-MCNC: 320 MG/DL (ref 200–360)
VIT B12 BLD-MCNC: 476 PG/ML (ref 211–946)

## 2024-12-18 PROCEDURE — G0439 PPPS, SUBSEQ VISIT: HCPCS | Performed by: INTERNAL MEDICINE

## 2024-12-18 PROCEDURE — 84466 ASSAY OF TRANSFERRIN: CPT

## 2024-12-18 PROCEDURE — 36415 COLL VENOUS BLD VENIPUNCTURE: CPT

## 2024-12-18 PROCEDURE — 1125F AMNT PAIN NOTED PAIN PRSNT: CPT | Performed by: INTERNAL MEDICINE

## 2024-12-18 PROCEDURE — 1170F FXNL STATUS ASSESSED: CPT | Performed by: INTERNAL MEDICINE

## 2024-12-18 PROCEDURE — 3077F SYST BP >= 140 MM HG: CPT | Performed by: INTERNAL MEDICINE

## 2024-12-18 PROCEDURE — 83540 ASSAY OF IRON: CPT

## 2024-12-18 PROCEDURE — 82728 ASSAY OF FERRITIN: CPT

## 2024-12-18 PROCEDURE — 3078F DIAST BP <80 MM HG: CPT | Performed by: INTERNAL MEDICINE

## 2024-12-18 PROCEDURE — 99214 OFFICE O/P EST MOD 30 MIN: CPT | Performed by: INTERNAL MEDICINE

## 2024-12-18 PROCEDURE — 82607 VITAMIN B-12: CPT

## 2024-12-18 NOTE — ASSESSMENT & PLAN NOTE
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 Depression is in partial remission and stable.    Plan:   Continue current medication therapy     Followup in 6 months.     Orders:    CBC & Differential; Future    Ferritin; Future    Iron Profile; Future    Comprehensive Metabolic Panel; Future    Ferritin; Future    Iron Profile; Future    Vitamin B12 anemia; Future    Folate anemia; Future

## 2024-12-18 NOTE — ASSESSMENT & PLAN NOTE
Hypertension is stable and controlled  Continue current treatment regimen.  Blood pressure will be reassessed in 6 months.    Orders:    CBC & Differential; Future    Ferritin; Future    Iron Profile; Future    Comprehensive Metabolic Panel; Future    Ferritin; Future    Iron Profile; Future    Vitamin B12 anemia; Future    Folate anemia; Future

## 2024-12-18 NOTE — PROGRESS NOTES
"CHIEF COMPLAINT/ HPI:  Medicare Wellness-subsequent (Wellness visit, Lab follow up , Pt states no concerns. )  Patient fell and hurt her back prior to going to [place] trip, she is here for follow-up with her blood pressure, anticoagulation, atrial fibrillation    MRI of the lumbar spine was done patient would like to review that        Objective   Vital Signs  Vitals:    12/18/24 1317   BP: 148/78   BP Location: Right arm   Patient Position: Sitting   Pulse: 71   Temp: 98.2 °F (36.8 °C)   SpO2: 97%   Weight: 77.1 kg (170 lb)   Height: 162.6 cm (64.02\")      Body mass index is 29.17 kg/m².  Review of Systems   Constitutional: Negative.    HENT: Negative.     Eyes: Negative.    Respiratory: Negative.     Cardiovascular: Negative.    Gastrointestinal: Negative.    Endocrine: Negative.    Genitourinary: Negative.    Musculoskeletal:  Positive for back pain.   Allergic/Immunologic: Negative.    Neurological: Negative.    Hematological: Negative.    Psychiatric/Behavioral: Negative.        Physical Exam  Constitutional:       General: She is not in acute distress.     Appearance: Normal appearance. She is obese.   HENT:      Head: Normocephalic.      Mouth/Throat:      Mouth: Mucous membranes are moist.   Eyes:      Conjunctiva/sclera: Conjunctivae normal.      Pupils: Pupils are equal, round, and reactive to light.   Cardiovascular:      Rate and Rhythm: Normal rate and regular rhythm.      Pulses: Normal pulses.      Heart sounds: Normal heart sounds.   Pulmonary:      Effort: Pulmonary effort is normal.      Breath sounds: Normal breath sounds.   Abdominal:      General: Bowel sounds are normal.      Palpations: Abdomen is soft.   Musculoskeletal:         General: No swelling. Normal range of motion.      Cervical back: Neck supple.   Skin:     General: Skin is warm and dry.      Coloration: Skin is not jaundiced.   Neurological:      General: No focal deficit present.      Mental Status: She is alert and " oriented to person, place, and time. Mental status is at baseline.   Psychiatric:         Mood and Affect: Mood normal.         Behavior: Behavior normal.         Thought Content: Thought content normal.         Judgment: Judgment normal.        Result Review :   Lab Results   Component Value Date     04/30/2019     CMP          6/10/2024    09:31 12/11/2024    10:43   CMP   Glucose 103  106    BUN 18  12    Creatinine 0.99  0.96    EGFR 59.6  61.8    Sodium 140  137    Potassium 4.5  4.1    Chloride 104  100    Calcium 9.6  9.7    Total Protein 6.4  6.7    Albumin 4.3  3.9    Globulin 2.1  2.8    Total Bilirubin 0.4  1.2    Alkaline Phosphatase 84  71    AST (SGOT) 15  19    ALT (SGPT) 11  16    Albumin/Globulin Ratio 2.0  1.4    BUN/Creatinine Ratio 18.2  12.5    Anion Gap 9.2  13.3      CBC w/diff          6/10/2024    09:31 12/11/2024    10:43   CBC w/Diff   WBC 7.96  9.89    RBC 5.99  5.18    Hemoglobin 11.4  10.2    Hematocrit 38.0  33.7    MCV 63.4  65.1    MCH 19.0  19.7    MCHC 30.0  30.3    RDW 18.6  17.9    Platelets 227  243    Neutrophil Rel % 67.7  77.6    Immature Granulocyte Rel % 0.4  0.4    Lymphocyte Rel % 16.3  9.1    Monocyte Rel % 9.5  8.6    Eosinophil Rel % 4.6  3.4    Basophil Rel % 1.5  0.9       Lipid Panel          6/10/2024    09:31 12/11/2024    10:43   Lipid Panel   Total Cholesterol 172  138    Triglycerides 92  79    HDL Cholesterol 55  38    VLDL Cholesterol 17  16    LDL Cholesterol  100  84    LDL/HDL Ratio 1.79  2.22       Lab Results   Component Value Date    TSH 1.680 12/11/2024    TSH 2.120 06/10/2024    TSH 2.320 07/10/2023      Lab Results   Component Value Date    FREET4 1.74 (H) 12/11/2024    FREET4 1.31 06/10/2024    FREET4 1.32 07/10/2023                          Visit Diagnoses:    ICD-10-CM ICD-9-CM   1. Medicare annual wellness visit, subsequent  Z00.00 V70.0   2. Paroxysmal atrial fibrillation  I48.0 427.31   3. H/O cardiac radiofrequency ablation  Z98.890  V15.1   4. Essential hypertension  I10 401.9   5. Mild aortic regurgitation  I35.1 424.1   6. Vitreous degeneration, unspecified laterality  H43.819 379.21   7. Thyroid nodule greater than or equal to 1 cm in diameter incidentally noted on imaging study  E04.1 241.0   8. Arthritis  M19.90 716.90   9. Major depressive disorder, recurrent, moderate  F33.1 296.32   10. Gastro-esophageal reflux disease without esophagitis  K21.9 530.81   11. Mixed hyperlipidemia  E78.2 272.2   12. Vitamin D deficiency  E55.9 268.9   13. Primary open angle glaucoma of right eye, unspecified glaucoma stage  H40.1110 365.11     365.70   14. History of colon polyps  Z86.0100 V12.72   15. Fracture of vertebra due to osteoporosis with routine healing, subsequent encounter  M80.08XD V54.27   16. Anxiety, generalized  F41.1 300.02   17. Moderate persistent asthma with acute exacerbation  J45.41 493.92   18. Iron deficiency anemia, unspecified iron deficiency anemia type  D50.9 280.9       Assessment and Plan   Diagnoses and all orders for this visit:    1. Medicare annual wellness visit, subsequent (Primary)  -     CBC & Differential; Future  -     Ferritin; Future  -     Iron Profile; Future  -     Comprehensive Metabolic Panel; Future    2. Paroxysmal atrial fibrillation  -     CBC & Differential; Future  -     Ferritin; Future  -     Iron Profile; Future  -     Comprehensive Metabolic Panel; Future    3. H/O cardiac radiofrequency ablation  -     CBC & Differential; Future  -     Ferritin; Future  -     Iron Profile; Future  -     Comprehensive Metabolic Panel; Future    4. Essential hypertension  -     CBC & Differential; Future  -     Ferritin; Future  -     Iron Profile; Future  -     Comprehensive Metabolic Panel; Future    5. Mild aortic regurgitation  -     CBC & Differential; Future  -     Ferritin; Future  -     Iron Profile; Future  -     Comprehensive Metabolic Panel; Future    6. Vitreous degeneration, unspecified laterality  -      CBC & Differential; Future  -     Ferritin; Future  -     Iron Profile; Future  -     Comprehensive Metabolic Panel; Future    7. Thyroid nodule greater than or equal to 1 cm in diameter incidentally noted on imaging study  -     CBC & Differential; Future  -     Ferritin; Future  -     Iron Profile; Future  -     Comprehensive Metabolic Panel; Future    8. Arthritis  -     CBC & Differential; Future  -     Ferritin; Future  -     Iron Profile; Future  -     Comprehensive Metabolic Panel; Future    9. Major depressive disorder, recurrent, moderate  -     CBC & Differential; Future  -     Ferritin; Future  -     Iron Profile; Future  -     Comprehensive Metabolic Panel; Future    10. Gastro-esophageal reflux disease without esophagitis  -     CBC & Differential; Future  -     Ferritin; Future  -     Iron Profile; Future  -     Comprehensive Metabolic Panel; Future    11. Mixed hyperlipidemia  -     CBC & Differential; Future  -     Ferritin; Future  -     Iron Profile; Future  -     Comprehensive Metabolic Panel; Future    12. Vitamin D deficiency  -     CBC & Differential; Future  -     Ferritin; Future  -     Iron Profile; Future  -     Comprehensive Metabolic Panel; Future    13. Primary open angle glaucoma of right eye, unspecified glaucoma stage  -     CBC & Differential; Future  -     Ferritin; Future  -     Iron Profile; Future  -     Comprehensive Metabolic Panel; Future    14. History of colon polyps  -     CBC & Differential; Future  -     Ferritin; Future  -     Iron Profile; Future  -     Comprehensive Metabolic Panel; Future    15. Fracture of vertebra due to osteoporosis with routine healing, subsequent encounter  -     CBC & Differential; Future  -     Ferritin; Future  -     Iron Profile; Future  -     Comprehensive Metabolic Panel; Future    16. Anxiety, generalized  -     CBC & Differential; Future  -     Ferritin; Future  -     Iron Profile; Future  -     Comprehensive Metabolic Panel;  Future    17. Moderate persistent asthma with acute exacerbation  -     CBC & Differential; Future  -     Ferritin; Future  -     Iron Profile; Future  -     Comprehensive Metabolic Panel; Future    18. Iron deficiency anemia, unspecified iron deficiency anemia type  -     CBC & Differential; Future  -     Ferritin; Future  -     Iron Profile; Future  -     Comprehensive Metabolic Panel; Future        Injured back, x-ray MRI shows 2 new compression fractures, L3 and L4 20% body height loss old fracture T12 and a previous old T7 fracture, not noted on this film but priorly noted,    Fatigue weakness, comes and goes    ANXIETY, DEPRESSION--continues Paxil 20 mg daily     Malarial prophylaxis, have sent in [facility] for #25 once a day July 17, 2023     Tinnitus, roaring in head === tympanoplasty's  Dr. Pepe in August 2022     ARTHRITIS, IN HANDS , THUMBS , R FOOT, LOW BACK PAIN, NECK OCC.--Now with cervical radiculopathy C4-5 area, -- rheumatologist -----was diagnosed clinically with rheumatoid arthritis, CCP antibody and rheumatoid factors are negative on blood test per rheumatology September 2020,-----cont --tylenol arthritis , methocarbamol as needed tramadol as needed-- rheumatology, okay to     A. Fib with rapid rate ---first ablation 2008, second 1 was in July 2017 subsequent ablations in 2019 with pacemaker done July 29, 2019,      continues- METOPROLOL,25 BID , ELIQUIS 5 MG BID      HTN, --- continues, metoprolol 25 TWICE A DAY,  olmesartan 40/12.5 HCT,     ANEMIA, THALLESEMIA- STABLE-hemoglobin down to 10.2 macro 33.7 December 11, 2024     Glaucoma, laser treatment of the left eye, August 2021, and macular tear in the right eye, has been seeing surgery and ophthalmology as of September 2021     R TKA, 5/13     HEART MURMUR, -aortic regurgitation moderate by echo February 2016, will follow with cardiology-- dr brown      GERD- , EGD JUNE 2018, ERIK MARIE-- cont Prilosec a.m., Pepcid nightly,--- EGD  December 13, 2022 Dr. Dobbs, also had a virtual colonoscopy due to difficulty with cannulating colonoscopy January 2023, virtual colonoscopy showed negative CT colonoscopy nonobstructing left renal calculus January 6, 2023     cholecystectomy, left total knee replacement 2010, previous right total hip replacement 2008, JACINTO and BSO,  R KNEE REVISION 6/2414 , WITH DR JOY     thryoid cyst, nodule -previous thyroid ultrasounds, March 2014 and April 2016 October 2020 shows large mass in the left thyroid completely cystic, measures 7.2 cm previously measuring 6.1 cm, a 1 cm hypoechoic right thyroid nodule measuring up to 0.6 cm previously, and another smaller nodule at 0.6 cm in the right lower lobe of the thyroid,----thyroid ultrasound October 7, 2021 shows bilateral thyroid nodules large left cystic thyroid nodule has increased in size benign characteristics follow-up again in 1 year the cyst measures 8.6 x 3 cm slightly increased in size------ consider ENT follow-up drainage, second opinion, versus watchful waiting,                 Follow Up   Return in about 6 months (around 6/18/2025).  Patient was given instructions and counseling regarding her condition or for health maintenance advice. Please see specific information pulled into the AVS if appropriate.

## 2024-12-18 NOTE — ASSESSMENT & PLAN NOTE
Psychological condition is stable.  Continue current treatment regimen.  Psychological condition  will be reassessed in 6 months.    Orders:    CBC & Differential; Future    Ferritin; Future    Iron Profile; Future    Comprehensive Metabolic Panel; Future    Ferritin; Future    Iron Profile; Future    Vitamin B12 anemia; Future    Folate anemia; Future

## 2024-12-18 NOTE — ASSESSMENT & PLAN NOTE
Orders:    CBC & Differential; Future    Ferritin; Future    Iron Profile; Future    Comprehensive Metabolic Panel; Future    Ferritin; Future    Iron Profile; Future    Vitamin B12 anemia; Future    Folate anemia; Future

## 2024-12-18 NOTE — ASSESSMENT & PLAN NOTE
Lipid abnormalities are stable    Plan:  Continue same medication/s without change.      Discussed medication dosage, use, side effects, and goals of treatment in detail.    Counseled patient on lifestyle modifications to help control hyperlipidemia.     Patient Treatment Goals:   LDL goal is less than 70    Followup in 6 months.    Orders:    CBC & Differential; Future    Ferritin; Future    Iron Profile; Future    Comprehensive Metabolic Panel; Future    Ferritin; Future    Iron Profile; Future    Vitamin B12 anemia; Future    Folate anemia; Future

## 2024-12-18 NOTE — ASSESSMENT & PLAN NOTE
Asthma is stable.  The patient is experiencing no daytime asthma symptoms and she is experiencing no nighttime asthma symptoms.    Plan: Continue same medication/s without change.    Discussed medication dosage, use, side effects, and goals of treatment in detail.    Discussed distinction between quick-relief and maintenance control medications.  Discussed monitoring symptoms and use of quick-relief medications and contacting provider early in the course of exacerbations.  Warning signs of respiratory distress were reviewed with the patient.     Patient Treatment Goals: symptom prevention, minimizing limitation in activity, prevention of exacerbations and use of ER/inpatient care, maintenance of optimal pulmonary function, and minimization of adverse effects of treatment.    Followup in 6 months.            Orders:    CBC & Differential; Future    Ferritin; Future    Iron Profile; Future    Comprehensive Metabolic Panel; Future    Ferritin; Future    Iron Profile; Future    Vitamin B12 anemia; Future    Folate anemia; Future

## 2024-12-21 DIAGNOSIS — J45.901 MODERATE ASTHMA WITH ACUTE EXACERBATION, UNSPECIFIED WHETHER PERSISTENT: ICD-10-CM

## 2024-12-21 DIAGNOSIS — R05.3 CHRONIC COUGH: ICD-10-CM

## 2024-12-23 DIAGNOSIS — J22 LOWER RESPIRATORY INFECTION (E.G., BRONCHITIS, PNEUMONIA, PNEUMONITIS, PULMONITIS): ICD-10-CM

## 2024-12-23 RX ORDER — IPRATROPIUM BROMIDE AND ALBUTEROL SULFATE 2.5; .5 MG/3ML; MG/3ML
SOLUTION RESPIRATORY (INHALATION)
Qty: 360 ML | Refills: 1 | Status: SHIPPED | OUTPATIENT
Start: 2024-12-23

## 2024-12-23 RX ORDER — MONTELUKAST SODIUM 10 MG/1
10 TABLET ORAL NIGHTLY
Qty: 30 TABLET | Refills: 1 | Status: SHIPPED | OUTPATIENT
Start: 2024-12-23

## 2024-12-27 DIAGNOSIS — J22 LOWER RESPIRATORY INFECTION (E.G., BRONCHITIS, PNEUMONIA, PNEUMONITIS, PULMONITIS): ICD-10-CM

## 2025-01-13 ENCOUNTER — HOSPITAL ENCOUNTER (OUTPATIENT)
Dept: GENERAL RADIOLOGY | Facility: HOSPITAL | Age: 75
Discharge: HOME | End: 2025-01-13
Admitting: PHYSICIAN ASSISTANT
Payer: MEDICARE

## 2025-01-13 DIAGNOSIS — S32.040A CLOSED COMPRESSION FRACTURE OF L4 LUMBAR VERTEBRA, INITIAL ENCOUNTER: ICD-10-CM

## 2025-01-13 DIAGNOSIS — S32.030A COMPRESSION FRACTURE OF L3 LUMBAR VERTEBRA, CLOSED, INITIAL ENCOUNTER: ICD-10-CM

## 2025-01-13 PROCEDURE — 72100 X-RAY EXAM L-S SPINE 2/3 VWS: CPT

## 2025-01-14 ENCOUNTER — OFFICE VISIT (OUTPATIENT)
Dept: NEUROSURGERY | Facility: CLINIC | Age: 75
End: 2025-01-14
Payer: MEDICARE

## 2025-01-14 VITALS
WEIGHT: 169 LBS | SYSTOLIC BLOOD PRESSURE: 151 MMHG | BODY MASS INDEX: 28.85 KG/M2 | HEART RATE: 67 BPM | DIASTOLIC BLOOD PRESSURE: 62 MMHG | HEIGHT: 64 IN

## 2025-01-14 DIAGNOSIS — S32.040D CLOSED COMPRESSION FRACTURE OF L4 LUMBAR VERTEBRA WITH ROUTINE HEALING, SUBSEQUENT ENCOUNTER: ICD-10-CM

## 2025-01-14 DIAGNOSIS — S32.030D CLOSED COMPRESSION FRACTURE OF L3 LUMBAR VERTEBRA WITH ROUTINE HEALING, SUBSEQUENT ENCOUNTER: Primary | ICD-10-CM

## 2025-01-14 RX ORDER — MULTIPLE VITAMINS W/ MINERALS TAB 9MG-400MCG
1 TAB ORAL DAILY
COMMUNITY

## 2025-01-14 NOTE — PROGRESS NOTES
Patient being seen for today for Follow-up  .    Subjective    Suzanna Santos is a 74 y.o. female that presents with Follow-up  .    HPI  Previously: Last seen on 12/10/2021 for L3 and L4 fractures.  Physical restrictions were recommended, she was deferring back bracing.  There is plan to continue restrictions for a minimum of 4 weeks and then follow-up to reassess with x-rays.    Today: She reports improved pain generally, but continued pain in the lower back, especially with prolonged standing. She rates the pain 6/10 today.    She is more functional now.    She denies new or changed complaints otherwise.     reports that she has never smoked. She has never been exposed to tobacco smoke. She has never used smokeless tobacco.    Review of Systems   Musculoskeletal:  Positive for back pain.       Objective   Vitals:    01/14/25 1116   BP: 151/62   Pulse: 67        Physical Exam  Constitutional:       Appearance: Normal appearance.   Pulmonary:      Effort: Pulmonary effort is normal.   Musculoskeletal:         General: No tenderness.      Comments: SLR negative bilaterally   Neurological:      General: No focal deficit present.      Mental Status: She is alert and oriented to person, place, and time.      Sensory: No sensory deficit.      Motor: No weakness.      Deep Tendon Reflexes: Reflexes normal.   Psychiatric:         Mood and Affect: Mood normal.         Behavior: Behavior normal.          Result Review   I personally interpreted the x-ray lumbar spine from 1/13/2025 which shows stable L3 and L4 compression fractures.     Assessment and Plan {CC Problem List  Visit Diagnosis  ROS  Review (Popup)  [facility] Maintenance  Quality  BestPractice  Medications  SmartSets  SnapShot Encounters  Media :23}   Diagnoses and all orders for this visit:    1. Closed compression fracture of L3 lumbar vertebra with routine healing, subsequent encounter (Primary)    2. Closed compression fracture of L4 lumbar  vertebra with routine healing, subsequent encounter    She has continued pain, although improving. She is more functional than after the accident.    The L3 and L4 bones are stable on x-ray.    We discussed possibly having a new MRI to assess for ongoing inflammation, considering the ongoing pain. She will defer today.    Given the timeframe since her injury and stable bones on imaging, I do expect the bones are most likely healed and she may consider resuming normal activity as tolerated and work on strengthening around the spine to benefit her current pain, which is most likely related to underlying degenerative change and new inflammation after the accident.    We did discuss PT, but she is deferring in favor of home exercise.    She will monitor for new or worsening complaints and notify us of change.    She will follow-up here PRN.  Follow Up {Instructions Charge Capture  Follow-up Communications :23}   Return if symptoms worsen or fail to improve.

## 2025-01-22 ENCOUNTER — CLINICAL SUPPORT NO REQUIREMENTS (OUTPATIENT)
Dept: CARDIOLOGY | Facility: CLINIC | Age: 75
End: 2025-01-22
Payer: MEDICARE

## 2025-01-22 ENCOUNTER — OFFICE VISIT (OUTPATIENT)
Dept: CARDIOLOGY | Facility: CLINIC | Age: 75
End: 2025-01-22
Payer: MEDICARE

## 2025-01-22 VITALS
WEIGHT: 167.6 LBS | HEIGHT: 65 IN | HEART RATE: 91 BPM | SYSTOLIC BLOOD PRESSURE: 107 MMHG | DIASTOLIC BLOOD PRESSURE: 67 MMHG | OXYGEN SATURATION: 97 % | BODY MASS INDEX: 27.92 KG/M2

## 2025-01-22 DIAGNOSIS — Z95.0 PRESENCE OF CARDIAC PACEMAKER: ICD-10-CM

## 2025-01-22 DIAGNOSIS — Z95.0 PRESENCE OF CARDIAC PACEMAKER: Primary | ICD-10-CM

## 2025-01-22 DIAGNOSIS — I49.5 SSS (SICK SINUS SYNDROME): ICD-10-CM

## 2025-01-22 DIAGNOSIS — E78.2 MIXED HYPERLIPIDEMIA: ICD-10-CM

## 2025-01-22 DIAGNOSIS — I10 ESSENTIAL HYPERTENSION: ICD-10-CM

## 2025-01-22 DIAGNOSIS — I48.0 PAF (PAROXYSMAL ATRIAL FIBRILLATION): Primary | ICD-10-CM

## 2025-01-22 NOTE — PROGRESS NOTES
Chief Complaint  Atrial Fibrillation    Subjective    Patient does report intermittent bursts of SVTs relatively brief primarily 1 episode did last for about 40 minutes otherwise usually just seconds associated with some tachycardic sensations. She has not any syncope or presyncopal episodes   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 but the roof line required additrional ablation   · 3/19 and was found to have an atypical rigfht atrial flutter, an 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 and a left atrial tachycardia near the AV node   · She developed recurrent SVT in the 130-150 range and was taken back to the lab    PONV (postoperative nausea and vomiting)     SVT (supraventricular tachycardia)     Thyroid disease     no med/cyst on thyroid         Current Outpatient Medications:     albuterol sulfate  (90 Base) MCG/ACT inhaler, 2 puffs Daily As Needed., Disp: , Rfl:     apixaban (Eliquis) 5 MG tablet tablet, Take 1 tablet by mouth Every 12 (Twelve) Hours., Disp: 180 tablet, Rfl: 3    famotidine (PEPCID) 20 MG tablet, Take 1 tablet by mouth Every Night., Disp: , Rfl:     folic acid (FOLVITE) 1 MG tablet, Take 1 tablet by mouth Daily., Disp: , Rfl:     ipratropium-albuterol (DUO-NEB) 0.5-2.5 mg/3 ml nebulizer, INHALE CONTENTS OF 1 VIAL USING NEBULIZER MACHINE EVERY 4 HOURS IF NEEDED FOR WHEEZING., Disp: 360 mL, Rfl: 1    methocarbamol  (ROBAXIN) 500 MG tablet, TAKE ONE TABLET BY MOUTH THREE TIMES A DAY IF NEEDED FOR MUSCLE SPASMS., Disp: 60 tablet, Rfl: 3    metoprolol tartrate (LOPRESSOR) 25 MG tablet, TAKE 1 TABLET BY MOUTH 2 (TWO) TIMES A DAY., Disp: 180 tablet, Rfl: 3    montelukast (SINGULAIR) 10 MG tablet, TAKE 1 TABLET BY MOUTH EVERY NIGHT., Disp: 30 tablet, Rfl: 1    multivitamin with minerals tablet tablet, Take 1 tablet by mouth Daily., Disp: , Rfl:     olmesartan-hydrochlorothiazide (BENICAR HCT) 40-12.5 MG per tablet, TAKE 1 TABLET BY MOUTH DAILY., Disp: 90 tablet, Rfl: 3    Omeprazole Magnesium (PRILOSEC OTC PO), Take 20 mg by mouth Daily., Disp: , Rfl:     PARoxetine (PAXIL) 20 MG tablet, TAKE 1 TABLET BY MOUTH EVERY MORNING., Disp: 90 tablet, Rfl: 3    pravastatin (PRAVACHOL) 10 MG tablet, TAKE 1 TABLET BY MOUTH EVERY NIGHT., Disp: 90 tablet, Rfl: 3    saccharomyces boulardii (FLORASTOR) 250 MG capsule, Take 1 capsule by mouth 2 (Two) Times a Day., Disp: , Rfl:     traMADol (ULTRAM) 50 MG tablet, Take 1 tablet by mouth Every 6 (Six) Hours As Needed for Moderate Pain ., Disp: 90 tablet, Rfl: 2    Trelegy Ellipta 200-62.5-25 MCG/ACT inhaler, INHALE 1 PUFF BY MOUTH EVERY DAY, Disp: 60 each, Rfl: 5    There are no discontinued medications.  Allergies   Allergen Reactions    Doxycycline Hives     .    Lariam [Mefloquine] Hives     .    Lisinopril Hives     .    Talwin [Pentazocine] Hallucinations        Social History     Tobacco Use    Smoking status: Never     Passive exposure: Never    Smokeless tobacco: Never    Tobacco comments:     Grew up in smoking environm.   Vaping Use    Vaping status: Never Used   Substance Use Topics    Alcohol use: No    Drug use: Never       Family History   Problem Relation Age of Onset    Heart attack Mother     Heart disease Mother     Diabetes Mother     Arthritis Mother     Arrhythmia Mother     Heart attack Father     Coronary artery disease Father     Heart disease Father     Diabetes Father      "Colon cancer Brother 30    Rectal cancer Brother 30    Heart disease Brother     Diabetes Brother     Arthritis Brother     Malig Hyperthermia Neg Hx         Objective     /67   Pulse 91   Ht 165.1 cm (65\")   Wt 76 kg (167 lb 9.6 oz)   SpO2 97%   BMI 27.89 kg/m²       Physical Exam    General Appearance:   no acute distress  Alert and oriented x3  HENT:   lips not cyanotic  Atraumatic  Neck:  No jvd   supple  Respiratory:  no respiratory distress  normal breath sounds  no rales  Cardiovascular:  Regular rate and rhythm  no S3, no S4   no murmur  no rub  Extremities  No cyanosis  lower extremity edema: none    Skin:   warm, dry  No rashes      Result Review :     No results found for: \"PROBNP\"  CMP          6/10/2024    09:31 12/11/2024    10:43   CMP   Glucose 103  106    BUN 18  12    Creatinine 0.99  0.96    EGFR 59.6  61.8    Sodium 140  137    Potassium 4.5  4.1    Chloride 104  100    Calcium 9.6  9.7    Total Protein 6.4  6.7    Albumin 4.3  3.9    Globulin 2.1  2.8    Total Bilirubin 0.4  1.2    Alkaline Phosphatase 84  71    AST (SGOT) 15  19    ALT (SGPT) 11  16    Albumin/Globulin Ratio 2.0  1.4    BUN/Creatinine Ratio 18.2  12.5    Anion Gap 9.2  13.3      CBC w/diff          6/10/2024    09:31 12/11/2024    10:43   CBC w/Diff   WBC 7.96  9.89    RBC 5.99  5.18    Hemoglobin 11.4  10.2    Hematocrit 38.0  33.7    MCV 63.4  65.1    MCH 19.0  19.7    MCHC 30.0  30.3    RDW 18.6  17.9    Platelets 227  243    Neutrophil Rel % 67.7  77.6    Immature Granulocyte Rel % 0.4  0.4    Lymphocyte Rel % 16.3  9.1    Monocyte Rel % 9.5  8.6    Eosinophil Rel % 4.6  3.4    Basophil Rel % 1.5  0.9       Lab Results   Component Value Date    TSH 1.680 12/11/2024      Lab Results   Component Value Date    FREET4 1.74 (H) 12/11/2024      No results found for: \"DDIMERQUANT\"  No results found for: \"MG\"   No results found for: \"DIGOXIN\"   No results found for: \"TROPONINT\"        Lipid Panel          6/10/2024    " "09:31 12/11/2024    10:43   Lipid Panel   Total Cholesterol 172  138    Triglycerides 92  79    HDL Cholesterol 55  38    VLDL Cholesterol 17  16    LDL Cholesterol  100  84    LDL/HDL Ratio 1.79  2.22      No results found for: \"POCTROP\"    Results for orders placed during the hospital encounter of 08/09/24    Adult Transthoracic Echo Complete W/ Cont if Necessary Per Protocol    Interpretation Summary    The study is technically difficult for diagnosis.    Left ventricular systolic function is normal. Calculated left ventricular EF = 52.4%    Left ventricular wall thickness is consistent with mild concentric hypertrophy.    The left atrial cavity is mild to moderately dilated.    Borderline  mild aortic stenosis    There is mild calcification of the aortic valve.  Dual-chamber pacemaker interrogated device working normally some paroxysmal SVT episodes her rate detection was decreased to 140              Diagnoses and all orders for this visit:    1. Paroxysmal atrial fibrillation (Primary)  Assessment & Plan:  Patient with some SVT episodes seen on pacemaker interrogation relatively brief usually just lasting for few seconds recommended persistence use of metoprolol 25 twice daily with extra as needed dosing as needed.  Patient is on Eliquis 5 twice daily for CVA prevention      2. Presence of cardiac pacemaker  Assessment & Plan:  Her device working normally continue with current settings and routine monitoring remotely in office check next year      3. SSS (sick sinus syndrome)    4. Mixed hyperlipidemia    5. Essential hypertension  Assessment & Plan:  Blood pressure remains at goal range on Lopressor 25 twice daily and olmesartan HCTZ 40/12.5               Follow Up     Return in about 6 months (around 7/22/2025) for Follow with Margarita Perez.          Patient was given instructions and counseling regarding her condition or for health maintenance advice. Please see specific information pulled into the AVS if " appropriate.

## 2025-01-22 NOTE — PROGRESS NOTES
Chief Complaint  Atrial Fibrillation    Subjective    Patient does report intermittent bursts of SVTs relatively brief primarily 1 episode did last for about 40 minutes otherwise usually just seconds associated with some tachycardic sensations.  She has not any syncope or presyncopal episodes.  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 but the roof line required additrional ablation   · 3/19 and was found to have an atypical rigfht atrial flutter, an 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 and a left atrial tachycardia near the AV node   · She developed recurrent SVT in the 130-150 range and was taken back to the lab    PONV (postoperative nausea and vomiting)     SVT (supraventricular tachycardia)     Thyroid disease     no med/cyst on thyroid         Current Outpatient Medications:     albuterol sulfate  (90 Base) MCG/ACT inhaler, 2 puffs Daily As Needed., Disp: , Rfl:     apixaban (Eliquis) 5 MG tablet tablet, Take 1 tablet by mouth Every 12 (Twelve) Hours., Disp: 180 tablet, Rfl: 3    famotidine (PEPCID) 20 MG tablet, Take 1 tablet by mouth Every Night., Disp: , Rfl:     folic acid (FOLVITE) 1 MG tablet, Take 1 tablet by mouth Daily., Disp: , Rfl:     ipratropium-albuterol (DUO-NEB) 0.5-2.5 mg/3 ml nebulizer, INHALE CONTENTS OF 1 VIAL USING NEBULIZER MACHINE EVERY 4 HOURS IF NEEDED FOR WHEEZING., Disp: 360 mL, Rfl: 1    methocarbamol  (ROBAXIN) 500 MG tablet, TAKE ONE TABLET BY MOUTH THREE TIMES A DAY IF NEEDED FOR MUSCLE SPASMS., Disp: 60 tablet, Rfl: 3    metoprolol tartrate (LOPRESSOR) 25 MG tablet, TAKE 1 TABLET BY MOUTH 2 (TWO) TIMES A DAY., Disp: 180 tablet, Rfl: 3    montelukast (SINGULAIR) 10 MG tablet, TAKE 1 TABLET BY MOUTH EVERY NIGHT., Disp: 30 tablet, Rfl: 1    multivitamin with minerals tablet tablet, Take 1 tablet by mouth Daily., Disp: , Rfl:     olmesartan-hydrochlorothiazide (BENICAR HCT) 40-12.5 MG per tablet, TAKE 1 TABLET BY MOUTH DAILY., Disp: 90 tablet, Rfl: 3    Omeprazole Magnesium (PRILOSEC OTC PO), Take 20 mg by mouth Daily., Disp: , Rfl:     PARoxetine (PAXIL) 20 MG tablet, TAKE 1 TABLET BY MOUTH EVERY MORNING., Disp: 90 tablet, Rfl: 3    pravastatin (PRAVACHOL) 10 MG tablet, TAKE 1 TABLET BY MOUTH EVERY NIGHT., Disp: 90 tablet, Rfl: 3    saccharomyces boulardii (FLORASTOR) 250 MG capsule, Take 1 capsule by mouth 2 (Two) Times a Day., Disp: , Rfl:     traMADol (ULTRAM) 50 MG tablet, Take 1 tablet by mouth Every 6 (Six) Hours As Needed for Moderate Pain ., Disp: 90 tablet, Rfl: 2    Trelegy Ellipta 200-62.5-25 MCG/ACT inhaler, INHALE 1 PUFF BY MOUTH EVERY DAY, Disp: 60 each, Rfl: 5    There are no discontinued medications.  Allergies   Allergen Reactions    Doxycycline Hives     .    Lariam [Mefloquine] Hives     .    Lisinopril Hives     .    Talwin [Pentazocine] Hallucinations        Social History     Tobacco Use    Smoking status: Never     Passive exposure: Never    Smokeless tobacco: Never    Tobacco comments:     Grew up in smoking environm.   Vaping Use    Vaping status: Never Used   Substance Use Topics    Alcohol use: No    Drug use: Never       Family History   Problem Relation Age of Onset    Heart attack Mother     Heart disease Mother     Diabetes Mother     Arthritis Mother     Arrhythmia Mother     Heart attack Father     Coronary artery disease Father     Heart disease Father     Diabetes Father      "Colon cancer Brother 30    Rectal cancer Brother 30    Heart disease Brother     Diabetes Brother     Arthritis Brother     Malig Hyperthermia Neg Hx         Objective     /67   Pulse 91   Ht 165.1 cm (65\")   Wt 76 kg (167 lb 9.6 oz)   SpO2 97%   BMI 27.89 kg/m²       Physical Exam    General Appearance:   no acute distress  Alert and oriented x3  HENT:   lips not cyanotic  Atraumatic  Neck:  No jvd   supple  Respiratory:  no respiratory distress  normal breath sounds  no rales  Cardiovascular:  Regular rate and rhythm  no S3, no S4   no murmur  no rub  Extremities  No cyanosis  lower extremity edema: none    Skin:   warm, dry  No rashes      Result Review :{Labs  Result Review  Imaging  Med Tab  Media :23}   {The following data was reviewed by (Optional):[id number]}  No results found for: \"PROBNP\"  CMP          6/10/2024    09:31 12/11/2024    10:43   CMP   Glucose 103  106    BUN 18  12    Creatinine 0.99  0.96    EGFR 59.6  61.8    Sodium 140  137    Potassium 4.5  4.1    Chloride 104  100    Calcium 9.6  9.7    Total Protein 6.4  6.7    Albumin 4.3  3.9    Globulin 2.1  2.8    Total Bilirubin 0.4  1.2    Alkaline Phosphatase 84  71    AST (SGOT) 15  19    ALT (SGPT) 11  16    Albumin/Globulin Ratio 2.0  1.4    BUN/Creatinine Ratio 18.2  12.5    Anion Gap 9.2  13.3      CBC w/diff          6/10/2024    09:31 12/11/2024    10:43   CBC w/Diff   WBC 7.96  9.89    RBC 5.99  5.18    Hemoglobin 11.4  10.2    Hematocrit 38.0  33.7    MCV 63.4  65.1    MCH 19.0  19.7    MCHC 30.0  30.3    RDW 18.6  17.9    Platelets 227  243    Neutrophil Rel % 67.7  77.6    Immature Granulocyte Rel % 0.4  0.4    Lymphocyte Rel % 16.3  9.1    Monocyte Rel % 9.5  8.6    Eosinophil Rel % 4.6  3.4    Basophil Rel % 1.5  0.9       Lab Results   Component Value Date    TSH 1.680 12/11/2024      Lab Results   Component Value Date    FREET4 1.74 (H) 12/11/2024      No results found for: \"DDIMERQUANT\"  No results found for: \"MG\"   No " "results found for: \"DIGOXIN\"   No results found for: \"TROPONINT\"        Lipid Panel          6/10/2024    09:31 12/11/2024    10:43   Lipid Panel   Total Cholesterol 172  138    Triglycerides 92  79    HDL Cholesterol 55  38    VLDL Cholesterol 17  16    LDL Cholesterol  100  84    LDL/HDL Ratio 1.79  2.22      No results found for: \"POCTROP\"    Results for orders placed during the hospital encounter of 08/09/24    Adult Transthoracic Echo Complete W/ Cont if Necessary Per Protocol    Interpretation Summary    The study is technically difficult for diagnosis.    Left ventricular systolic function is normal. Calculated left ventricular EF = 52.4%    Left ventricular wall thickness is consistent with mild concentric hypertrophy.    The left atrial cavity is mild to moderately dilated.    Borderline  mild aortic stenosis    There is mild calcification of the aortic valve.                 Diagnoses and all orders for this visit:    1. Paroxysmal atrial fibrillation (Primary)  Assessment & Plan:  Patient with some SVT episodes seen on pacemaker interrogation relatively brief usually just lasting for few seconds recommended persistence use of metoprolol 25 twice daily with extra as needed dosing as needed.  Patient is on Eliquis 5 twice daily for CVA prevention      2. Presence of cardiac pacemaker  Assessment & Plan:  Her device working normally continue with current settings and routine monitoring remotely in office check next year      3. SSS (sick sinus syndrome)    4. Mixed hyperlipidemia    5. Essential hypertension  Assessment & Plan:  Blood pressure remains at goal range on Lopressor 25 twice daily and olmesartan HCTZ 40/12.5               Follow Up {Instructions Charge Capture  Follow-up Communications :23}    Return in about 6 months (around 7/22/2025) for Follow with Margarita Perez.          Patient was given instructions and counseling regarding her condition or for health maintenance advice. Please see " specific information pulled into the AVS if appropriate.

## 2025-01-22 NOTE — ASSESSMENT & PLAN NOTE
Her device working normally continue with current settings and routine monitoring remotely in office check next year

## 2025-01-28 ENCOUNTER — OFFICE VISIT (OUTPATIENT)
Dept: INTERNAL MEDICINE | Age: 75
End: 2025-01-28
Payer: MEDICARE

## 2025-01-28 VITALS
TEMPERATURE: 97.8 F | DIASTOLIC BLOOD PRESSURE: 76 MMHG | HEART RATE: 90 BPM | WEIGHT: 172.8 LBS | HEIGHT: 65 IN | OXYGEN SATURATION: 97 % | SYSTOLIC BLOOD PRESSURE: 112 MMHG | BODY MASS INDEX: 28.79 KG/M2 | RESPIRATION RATE: 18 BRPM

## 2025-01-28 DIAGNOSIS — H61.21 IMPACTED CERUMEN OF RIGHT EAR: Primary | ICD-10-CM

## 2025-01-28 PROCEDURE — 1160F RVW MEDS BY RX/DR IN RCRD: CPT | Performed by: NURSE PRACTITIONER

## 2025-01-28 PROCEDURE — 1159F MED LIST DOCD IN RCRD: CPT | Performed by: NURSE PRACTITIONER

## 2025-01-28 PROCEDURE — 99213 OFFICE O/P EST LOW 20 MIN: CPT | Performed by: NURSE PRACTITIONER

## 2025-01-28 PROCEDURE — 1125F AMNT PAIN NOTED PAIN PRSNT: CPT | Performed by: NURSE PRACTITIONER

## 2025-01-28 PROCEDURE — 3074F SYST BP LT 130 MM HG: CPT | Performed by: NURSE PRACTITIONER

## 2025-01-28 PROCEDURE — 3078F DIAST BP <80 MM HG: CPT | Performed by: NURSE PRACTITIONER

## 2025-01-28 PROCEDURE — 69210 REMOVE IMPACTED EAR WAX UNI: CPT | Performed by: NURSE PRACTITIONER

## 2025-01-28 NOTE — PROGRESS NOTES
Chief Complaint  Ear Drainage (74 year old female here today for right ear drainage )    Subjective      History of Present Illness  The patient is a 74-year-old female who presents for an acute visit.    She reports experiencing drainage from her right ear, which she describes as clear and sticky. This symptom began abruptly in the middle of the previous night, causing significant discomfort and disrupting her sleep. She also notes an associated itching sensation in the same ear. Despite these symptoms, she does not report any pain. Additionally, she mentions a sensation of pressure in her head when she speaks, but does not experience any headaches or tinnitus. Her usual hygiene routine involves bathing, with hair washing occurring once a week during a shower. However, she has not showered for several days prior to the onset of these symptoms. She recalls a previous episode last year where Dr. Pepe removed a large, painful wax ball from her ear. She has a history of eustachian tube issues and had ear tubes inserted by Dr. Pepe several years ago.       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 but the roof line required additrional ablation   · 3/19 and was found to have an atypical rigfht atrial flutter, an 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 and a left atrial tachycardia near the AV node   · She developed recurrent SVT in the 130-150 range  and was taken back to the lab    PONV (postoperative nausea and vomiting)     SVT (supraventricular tachycardia)     Thyroid disease     no med/cyst on thyroid        Past Surgical History:   Procedure Laterality Date    CARDIAC ABLATION      x4 - last 2019    CARDIAC CATHETERIZATION      CARDIAC ELECTROPHYSIOLOGY MAPPING AND ABLATION      CATARACT EXTRACTION, BILATERAL      CHOLECYSTECTOMY      COLONOSCOPY  02/08/2017    DR MANTILLA HISTORY OF COLON POLYPS    COLONOSCOPY  09/30/2021    dr mantilla    COLONOSCOPY N/A 09/30/2021    Procedure: COLONOSCOPY with BIOPSY/POLYPECTOMY COLD SNARE;  Surgeon: Tim Mantilla MD;  Location: [facility] ENDOSCOPY;  Service: Gastroenterology;  Laterality: N/A;  COLON POLYPS    COLONOSCOPY N/A 5/28/2024    Procedure: COLONOSCOPY;  Surgeon: Tim Mantilla MD;  Location: [facility] ENDOSCOPY;  Service: Gastroenterology;  Laterality: N/A;  HEMORRHOIDS    EAR TUBES Bilateral 08/30/2022    Procedure: BILATERAL MYRINGOTOMY WITH INSERTION OF EAR TUBES, NASOPHARYNGOSCOPY;  Surgeon: Ag Pepe MD;  Location: [facility] MAIN OR;  Service: ENT;  Laterality: Bilateral;    ENDOSCOPY  06/18/2018    DR MANTILLA    ENDOSCOPY N/A 12/13/2022    Procedure: ESOPHAGOGASTRODUODENOSCOPY WITH BIOPSIES;  Surgeon: Tim Mantilla MD;  Location: [facility] ENDOSCOPY;  Service: Gastroenterology;  Laterality: N/A;  HIATAL HERNIA    EYE SURGERY      Macular tear repair 3/1/2021 x2    HYSTERECTOMY      INSERT / REPLACE / REMOVE PACEMAKER      JOINT REPLACEMENT Right     total hip replacement     JOINT REPLACEMENT Bilateral     TKR    PACEMAKER REPLACEMENT      SIGMOIDOSCOPY  12/13/2022    Procedure: SIGMOIDOSCOPY FLEXIBLE;  Surgeon: Tim Mantilla MD;  Location: [facility] ENDOSCOPY;  Service: Gastroenterology;;  DIVERTICULOSIS    TONSILLECTOMY      VITRECTOMY PARS PLANA W/ REPAIR OF MACULAR HOLE          Social History     Tobacco Use   Smoking Status Never    Passive exposure: Never   Smokeless Tobacco Never    Tobacco Comments    Grew up in smoking environm.        Patient Care Team:  Joshua Lee MD as PCP - General (Internal Medicine)  Prince, Austin OCAMPO MD as Surgeon (Orthopedic Surgery)  Ag Pepe MD as Consulting Physician (Otolaryngology)  Dawit Shook MD as Consulting Physician (Cardiology)    Allergies   Allergen Reactions    Doxycycline Hives     .    Lariam [Mefloquine] Hives     .    Lisinopril Hives     .    Talwin [Pentazocine] Hallucinations          Current Outpatient Medications:     albuterol sulfate  (90 Base) MCG/ACT inhaler, 2 puffs Daily As Needed., Disp: , Rfl:     apixaban (Eliquis) 5 MG tablet tablet, Take 1 tablet by mouth Every 12 (Twelve) Hours., Disp: 180 tablet, Rfl: 3    famotidine (PEPCID) 20 MG tablet, Take 1 tablet by mouth Every Night., Disp: , Rfl:     folic acid (FOLVITE) 1 MG tablet, Take 1 tablet by mouth Daily., Disp: , Rfl:     ipratropium-albuterol (DUO-NEB) 0.5-2.5 mg/3 ml nebulizer, INHALE CONTENTS OF 1 VIAL USING NEBULIZER MACHINE EVERY 4 HOURS IF NEEDED FOR WHEEZING., Disp: 360 mL, Rfl: 1    methocarbamol (ROBAXIN) 500 MG tablet, TAKE ONE TABLET BY MOUTH THREE TIMES A DAY IF NEEDED FOR MUSCLE SPASMS., Disp: 60 tablet, Rfl: 3    metoprolol tartrate (LOPRESSOR) 25 MG tablet, TAKE 1 TABLET BY MOUTH 2 (TWO) TIMES A DAY., Disp: 180 tablet, Rfl: 3    montelukast (SINGULAIR) 10 MG tablet, TAKE 1 TABLET BY MOUTH EVERY NIGHT., Disp: 30 tablet, Rfl: 1    multivitamin with minerals tablet tablet, Take 1 tablet by mouth Daily., Disp: , Rfl:     olmesartan-hydrochlorothiazide (BENICAR HCT) 40-12.5 MG per tablet, TAKE 1 TABLET BY MOUTH DAILY., Disp: 90 tablet, Rfl: 3    Omeprazole Magnesium (PRILOSEC OTC PO), Take 20 mg by mouth Daily., Disp: , Rfl:     PARoxetine (PAXIL) 20 MG tablet, TAKE 1 TABLET BY MOUTH EVERY MORNING., Disp: 90 tablet, Rfl: 3    pravastatin (PRAVACHOL) 10 MG tablet, TAKE 1 TABLET BY MOUTH EVERY NIGHT., Disp: 90 tablet, Rfl: 3    saccharomyces  "boulardii (FLORASTOR) 250 MG capsule, Take 1 capsule by mouth 2 (Two) Times a Day., Disp: , Rfl:     traMADol (ULTRAM) 50 MG tablet, Take 1 tablet by mouth Every 6 (Six) Hours As Needed for Moderate Pain ., Disp: 90 tablet, Rfl: 2    Trelegy Ellipta 200-62.5-25 MCG/ACT inhaler, INHALE 1 PUFF BY MOUTH EVERY DAY, Disp: 60 each, Rfl: 5    Objective     Vitals:    01/28/25 1600   BP: 112/76   BP Location: Right arm   Patient Position: Sitting   Cuff Size: Small Adult   Pulse: 90   Resp: 18   Temp: 97.8 °F (36.6 °C)   TempSrc: Temporal   SpO2: 97%   Weight: 78.4 kg (172 lb 12.8 oz)   Height: 165.1 cm (65\")        Wt Readings from Last 3 Encounters:   01/28/25 78.4 kg (172 lb 12.8 oz)   01/22/25 76 kg (167 lb 9.6 oz)   01/14/25 76.7 kg (169 lb)        BP Readings from Last 3 Encounters:   01/28/25 112/76   01/22/25 107/67   01/14/25 151/62        Physical Exam      Physical Exam  Vitals reviewed.   Constitutional:       General: She is not in acute distress.  HENT:      Head: Normocephalic and atraumatic.      Ears:      Comments: There is a small amount of wax in the left ear. The right ear has a large ball of wax with some drainage underneath. The TMs are normal without erythema.  Pulmonary:      Effort: Pulmonary effort is normal.   Neurological:      General: No focal deficit present.      Mental Status: She is alert.   Psychiatric:         Thought Content: Thought content normal.           Ear Cerumen Removal    Date/Time: 1/28/2025 4:24 PM    Performed by: Veronica Stephens APRN  Authorized by: Veronica Stephens APRN  Location details: right ear and left ear  Patient tolerance: patient tolerated the procedure well with no immediate complications  Procedure type: instrumentation, irrigation         Result Review   The following data was reviewed by: ROSHAN Lemons on 01/28/2025:  []  Tests & Results  []  Hospitalization/Emergency Department/Urgent Care  []  Internal/External Consultant Notes       Assessment " and Plan     Diagnoses and all orders for this visit:    1. Impacted cerumen of right ear (Primary)  -     Ear Cerumen Removal         Assessment & Plan  1. Cerumen impaction, right ear.  The symptoms of drainage and itching in the right ear are likely due to cerumen impaction. There is no evidence of purulent infection, and the eardrum appears normal without redness. The patient reports no pain, which further supports the diagnosis of cerumen impaction rather than an infection. An attempt was made to manually remove the impacted cerumen, but it was not fully successful. The patient will undergo irrigation of both ears to remove the excessive cerumen. She has been informed that she may notice a tinge of blood due to scraping of the ear canal, but this should not be a cause for concern. If there is no improvement following the irrigation, she was advised to follow-up with ENT specialist, Dr. Pepe.    PROCEDURE  The patient had ear tubes inserted by Dr. Pepe several years ago.     Patient was given instructions and counseling regarding her condition or for health maintenance advice. Please see specific information pulled into the AVS if appropriate.     Follow Up   Return for or if symptoms worsen or fail to improve.    Patient or patient representative verbalized consent for the use of Ambient Listening during the visit with  ROSHAN Lemons for chart documentation. 1/28/2025  16:17 EST    ROSHAN Lemons

## 2025-01-28 NOTE — PATIENT INSTRUCTIONS
Earwax Buildup, Adult  Your ears make something called earwax. It helps keep germs called bacteria away and protects the skin in your ears. Sometimes, too much earwax can build up. This can cause discomfort or make it harder to hear.  What are the causes?  Earwax buildup can happen when you have too much earwax in your ears. Earwax is made in the outer part of your ear canal. It's supposed to fall out in small amounts over time.  But if your ears aren't able to clean themselves like they should, earwax can build up.  What increases the risk?  You're more likely to get earwax buildup if:  You clean your ears with cotton swabs.  You pick at your ears.  You use earplugs or in-ear headphones a lot.  You wear hearing aids.  You may also be more likely to get it if:  You're male.  You're older.  Your ears naturally make more earwax.  You have narrow ear canals or extra hair in your ears.  Your earwax is too thick or sticky.  You have eczema.  You're dehydrated. This means there's not enough fluid in your body.  What are the signs or symptoms?  Symptoms of earwax buildup include:  Not being able to hear as well.  A feeling of fullness in your ear.  Feeling like your ear is plugged.  Fluid coming from your ear.  Ear pain or an itchy ear.  Ringing in your ear.  Coughing or problems with balance.  How is this diagnosed?  Earwax buildup may be diagnosed based on your symptoms, medical history, and an ear exam. During the exam, your health care provider will look into your ear with a tool called an otoscope.  You may also have tests, such as a hearing test.  How is this treated?  Earwax buildup may be treated by:  Using ear drops.  Having the earwax removed by a provider. The provider may:  Flush the ear with water.  Use a tool called a curette that has a loop on the end.  Use a suction device.  Having surgery. This may be done in severe cases.  Follow these instructions at home:    Cleaning your ears  Clean your ears as told  by your provider. You can clean the outside of your ears with a washcloth or tissue.  Do not overclean your ears.  Do not put anything into your ear unless told. This includes cotton swabs.  General instructions  Take over-the-counter and prescription medicines only as told by your provider.  Drink enough fluid to keep your pee (urine) pale yellow. This helps thin the earwax.  If you have hearing aids, clean them as told.  Keep all follow-up visits. If earwax builds up in your ears often or if you use hearing aids, ask your provider how often you should have your ears cleaned.  Contact a health care provider if:  Your ear pain gets worse.  You have a fever.  You have pus, blood, or other fluid coming from your ear.  You have hearing loss.  You have ringing in your ears that won't go away.  You feel like the room is spinning. This is called vertigo.  Your symptoms don't get better with treatment.  This information is not intended to replace advice given to you by your health care provider. Make sure you discuss any questions you have with your health care provider.  Document Revised: 02/29/2024 Document Reviewed: 02/29/2024  [employer]vier Patient Education © 2024 [employer]

## 2025-02-03 DIAGNOSIS — I48.0 PAF (PAROXYSMAL ATRIAL FIBRILLATION): ICD-10-CM

## 2025-02-04 RX ORDER — APIXABAN 5 MG/1
5 TABLET, FILM COATED ORAL
Qty: 60 TABLET | Refills: 3 | Status: SHIPPED | OUTPATIENT
Start: 2025-02-04

## 2025-02-20 DIAGNOSIS — J45.901 MODERATE ASTHMA WITH ACUTE EXACERBATION, UNSPECIFIED WHETHER PERSISTENT: ICD-10-CM

## 2025-02-20 DIAGNOSIS — R05.3 CHRONIC COUGH: ICD-10-CM

## 2025-02-20 RX ORDER — MONTELUKAST SODIUM 10 MG/1
10 TABLET ORAL NIGHTLY
Qty: 30 TABLET | Refills: 1 | Status: SHIPPED | OUTPATIENT
Start: 2025-02-20

## 2025-03-19 ENCOUNTER — HOSPITAL ENCOUNTER (EMERGENCY)
Facility: HOSPITAL | Age: 75
Discharge: HOME | End: 2025-03-19
Attending: EMERGENCY MEDICINE | Admitting: EMERGENCY MEDICINE
Payer: MEDICARE

## 2025-03-19 ENCOUNTER — APPOINTMENT (OUTPATIENT)
Dept: CT IMAGING | Facility: HOSPITAL | Age: 75
End: 2025-03-19
Payer: MEDICARE

## 2025-03-19 ENCOUNTER — APPOINTMENT (OUTPATIENT)
Dept: GENERAL RADIOLOGY | Facility: HOSPITAL | Age: 75
End: 2025-03-19
Payer: MEDICARE

## 2025-03-19 VITALS
WEIGHT: 170.19 LBS | DIASTOLIC BLOOD PRESSURE: 69 MMHG | SYSTOLIC BLOOD PRESSURE: 138 MMHG | HEART RATE: 72 BPM | TEMPERATURE: 98.5 F | OXYGEN SATURATION: 98 % | RESPIRATION RATE: 16 BRPM | BODY MASS INDEX: 28.36 KG/M2 | HEIGHT: 65 IN

## 2025-03-19 DIAGNOSIS — S70.01XA CONTUSION OF RIGHT HIP, INITIAL ENCOUNTER: ICD-10-CM
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
## 2025-03-19 DIAGNOSIS — S00.83XA CONTUSION OF FOREHEAD, INITIAL ENCOUNTER: ICD-10-CM

## 2025-03-19 DIAGNOSIS — S20.211A CONTUSION OF RIB ON RIGHT SIDE, INITIAL ENCOUNTER: ICD-10-CM

## 2025-03-19 DIAGNOSIS — S62.101A CLOSED FRACTURE OF RIGHT WRIST, INITIAL ENCOUNTER: Primary | ICD-10-CM

## 2025-03-19 LAB
ALBUMIN SERPL-MCNC: 4 G/DL (ref 3.5–5.2)
ALBUMIN/GLOB SERPL: 1.7 G/DL
ALP SERPL-CCNC: 87 U/L (ref 39–117)
ALT SERPL W P-5'-P-CCNC: 16 U/L (ref 1–33)
ANION GAP SERPL CALCULATED.3IONS-SCNC: 8.6 MMOL/L (ref 5–15)
AST SERPL-CCNC: 20 U/L (ref 1–32)
BASOPHILS # BLD AUTO: 0.04 10*3/MM3 (ref 0–0.2)
BASOPHILS NFR BLD AUTO: 0.3 % (ref 0–1.5)
BILIRUB SERPL-MCNC: 0.8 MG/DL (ref 0–1.2)
BUN SERPL-MCNC: 19 MG/DL (ref 8–23)
BUN/CREAT SERPL: 20.2 (ref 7–25)
CALCIUM SPEC-SCNC: 9.4 MG/DL (ref 8.6–10.5)
CHLORIDE SERPL-SCNC: 99 MMOL/L (ref 98–107)
CO2 SERPL-SCNC: 26.4 MMOL/L (ref 22–29)
CREAT SERPL-MCNC: 0.94 MG/DL (ref 0.57–1)
DEPRECATED RDW RBC AUTO: 36.2 FL (ref 37–54)
EGFRCR SERPLBLD CKD-EPI 2021: 63 ML/MIN/1.73
EOSINOPHIL # BLD AUTO: 0.33 10*3/MM3 (ref 0–0.4)
EOSINOPHIL NFR BLD AUTO: 2.7 % (ref 0.3–6.2)
ERYTHROCYTE [DISTWIDTH] IN BLOOD BY AUTOMATED COUNT: 17.8 % (ref 12.3–15.4)
GLOBULIN UR ELPH-MCNC: 2.4 GM/DL
GLUCOSE SERPL-MCNC: 105 MG/DL (ref 65–99)
HCT VFR BLD AUTO: 36.5 % (ref 34–46.6)
HGB BLD-MCNC: 11.3 G/DL (ref 12–15.9)
HOLD SPECIMEN: NORMAL
HOLD SPECIMEN: NORMAL
IMM GRANULOCYTES # BLD AUTO: 0.25 10*3/MM3 (ref 0–0.05)
IMM GRANULOCYTES NFR BLD AUTO: 2.1 % (ref 0–0.5)
LYMPHOCYTES # BLD AUTO: 1.37 10*3/MM3 (ref 0.7–3.1)
LYMPHOCYTES NFR BLD AUTO: 11.2 % (ref 19.6–45.3)
MCH RBC QN AUTO: 19.6 PG (ref 26.6–33)
MCHC RBC AUTO-ENTMCNC: 31 G/DL (ref 31.5–35.7)
MCV RBC AUTO: 63.1 FL (ref 79–97)
MONOCYTES # BLD AUTO: 1.1 10*3/MM3 (ref 0.1–0.9)
MONOCYTES NFR BLD AUTO: 9 % (ref 5–12)
NEUTROPHILS NFR BLD AUTO: 74.7 % (ref 42.7–76)
NEUTROPHILS NFR BLD AUTO: 9.09 10*3/MM3 (ref 1.7–7)
NRBC BLD AUTO-RTO: 0 /100 WBC (ref 0–0.2)
PLATELET # BLD AUTO: 278 10*3/MM3 (ref 140–450)
PMV BLD AUTO: 10.5 FL (ref 6–12)
POTASSIUM SERPL-SCNC: 4.3 MMOL/L (ref 3.5–5.2)
PROT SERPL-MCNC: 6.4 G/DL (ref 6–8.5)
RBC # BLD AUTO: 5.78 10*6/MM3 (ref 3.77–5.28)
SODIUM SERPL-SCNC: 134 MMOL/L (ref 136–145)
WBC NRBC COR # BLD AUTO: 12.18 10*3/MM3 (ref 3.4–10.8)
WHOLE BLOOD HOLD COAG: NORMAL
WHOLE BLOOD HOLD SPECIMEN: NORMAL

## 2025-03-19 PROCEDURE — 99284 EMERGENCY DEPT VISIT MOD MDM: CPT

## 2025-03-19 PROCEDURE — 71100 X-RAY EXAM RIBS UNI 2 VIEWS: CPT

## 2025-03-19 PROCEDURE — 71045 X-RAY EXAM CHEST 1 VIEW: CPT

## 2025-03-19 PROCEDURE — 96374 THER/PROPH/DIAG INJ IV PUSH: CPT

## 2025-03-19 PROCEDURE — 70450 CT HEAD/BRAIN W/O DYE: CPT

## 2025-03-19 PROCEDURE — 85025 COMPLETE CBC W/AUTO DIFF WBC: CPT | Performed by: EMERGENCY MEDICINE

## 2025-03-19 PROCEDURE — 25010000002 HYDROMORPHONE 1 MG/ML SOLUTION: Performed by: EMERGENCY MEDICINE

## 2025-03-19 PROCEDURE — 80053 COMPREHEN METABOLIC PANEL: CPT | Performed by: EMERGENCY MEDICINE

## 2025-03-19 PROCEDURE — 96376 TX/PRO/DX INJ SAME DRUG ADON: CPT

## 2025-03-19 PROCEDURE — 73110 X-RAY EXAM OF WRIST: CPT

## 2025-03-19 PROCEDURE — 73502 X-RAY EXAM HIP UNI 2-3 VIEWS: CPT

## 2025-03-19 RX ORDER — HYDROCODONE BITARTRATE AND ACETAMINOPHEN 5; 325 MG/1; MG/1
1 TABLET ORAL EVERY 6 HOURS PRN
Qty: 20 TABLET | Refills: 0 | Status: SHIPPED | OUTPATIENT
Start: 2025-03-19 | End: 2025-03-21

## 2025-03-19 RX ORDER — SODIUM CHLORIDE 0.9 % (FLUSH) 0.9 %
10 SYRINGE (ML) INJECTION AS NEEDED
Status: DISCONTINUED | OUTPATIENT
Start: 2025-03-19 | End: 2025-03-20 | Stop reason: HOSPADM

## 2025-03-19 RX ADMIN — HYDROMORPHONE HYDROCHLORIDE 0.5 MG: 1 INJECTION, SOLUTION INTRAMUSCULAR; INTRAVENOUS; SUBCUTANEOUS at 23:06

## 2025-03-19 RX ADMIN — HYDROMORPHONE HYDROCHLORIDE 0.5 MG: 1 INJECTION, SOLUTION INTRAMUSCULAR; INTRAVENOUS; SUBCUTANEOUS at 20:41

## 2025-03-20 ENCOUNTER — TELEPHONE (OUTPATIENT)
Dept: ORTHOPEDIC SURGERY | Facility: CLINIC | Age: 75
End: 2025-03-20
Payer: MEDICARE

## 2025-03-20 NOTE — ED PROVIDER NOTES
Time: 10:48 PM EDT  Date of encounter:  3/19/2025  Independent Historian/Clinical History and Information was obtained by:   Patient and Family  Chief Complaint: Fall    History is limited by: N/A    History of Present Illness:  Patient is a 74 y.o. year old female who presents to the emergency department for evaluation of possible injuries after a fall.  Patient states that she was walking to [place] when she tripped and fell.  Patient reports she landed on concrete.  Patient landed on her right side.  Patient denies any loss of consciousness.  She does complain of a headache as well as right rib pain, right hip pain, and right wrist pain.  Patient is on Eliquis.  Patient denies any shortness of breath or abdominal pain.    Patient Care Team  Primary Care Provider: Joshua Lee MD    Past Medical History:     Allergies   Allergen Reactions    Doxycycline Hives     .    Lariam [Mefloquine] Hives     .    Lisinopril Hives     .    Talwin [Pentazocine] Hallucinations     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 but the roof line required additrional ablation   · 3/19 and was found to have an atypical rigfht atrial flutter, an 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 and a left atrial tachycardia near the AV node   · She developed recurrent SVT in the 130-150 range and was taken back to the lab    PONV (postoperative nausea and vomiting)      SVT (supraventricular tachycardia)     Thyroid disease     no med/cyst on thyroid     Past Surgical History:   Procedure Laterality Date    CARDIAC ABLATION      x4 - last 2019    CARDIAC CATHETERIZATION      CARDIAC ELECTROPHYSIOLOGY MAPPING AND ABLATION      CATARACT EXTRACTION, BILATERAL      CHOLECYSTECTOMY      COLONOSCOPY  02/08/2017    DR MANTILLA HISTORY OF COLON POLYPS    COLONOSCOPY  09/30/2021    dr mantilla    COLONOSCOPY N/A 09/30/2021    Procedure: COLONOSCOPY with BIOPSY/POLYPECTOMY COLD SNARE;  Surgeon: Tim Mantilla MD;  Location: [facility] ENDOSCOPY;  Service: Gastroenterology;  Laterality: N/A;  COLON POLYPS    COLONOSCOPY N/A 5/28/2024    Procedure: COLONOSCOPY;  Surgeon: Tim Mantilla MD;  Location: [facility] ENDOSCOPY;  Service: Gastroenterology;  Laterality: N/A;  HEMORRHOIDS    EAR TUBES Bilateral 08/30/2022    Procedure: BILATERAL MYRINGOTOMY WITH INSERTION OF EAR TUBES, NASOPHARYNGOSCOPY;  Surgeon: Ag Pepe MD;  Location: [facility] MAIN OR;  Service: ENT;  Laterality: Bilateral;    ENDOSCOPY  06/18/2018    DR MANTILLA    ENDOSCOPY N/A 12/13/2022    Procedure: ESOPHAGOGASTRODUODENOSCOPY WITH BIOPSIES;  Surgeon: Tim Mantilla MD;  Location: [facility] ENDOSCOPY;  Service: Gastroenterology;  Laterality: N/A;  HIATAL HERNIA    EYE SURGERY      Macular tear repair 3/1/2021 x2    HYSTERECTOMY      INSERT / REPLACE / REMOVE PACEMAKER      JOINT REPLACEMENT Right     total hip replacement     JOINT REPLACEMENT Bilateral     TKR    PACEMAKER REPLACEMENT      SIGMOIDOSCOPY  12/13/2022    Procedure: SIGMOIDOSCOPY FLEXIBLE;  Surgeon: Tim Mantilla MD;  Location: [facility] ENDOSCOPY;  Service: Gastroenterology;;  DIVERTICULOSIS    TONSILLECTOMY      VITRECTOMY PARS PLANA W/ REPAIR OF MACULAR HOLE       Family History   Problem Relation Age of Onset    Heart attack Mother     Heart disease Mother     Diabetes Mother     Arthritis Mother     Arrhythmia Mother     Heart attack Father      Coronary artery disease Father     Heart disease Father     Diabetes Father     Colon cancer Brother 30    Rectal cancer Brother 30    Heart disease Brother     Diabetes Brother     Arthritis Brother     Malig Hyperthermia Neg Hx        Home Medications:  Prior to Admission medications    Medication Sig Start Date End Date Taking? Authorizing Provider   albuterol sulfate  (90 Base) MCG/ACT inhaler 2 puffs Daily As Needed.    Anitha Munoz MD   Eliquis 5 MG tablet tablet TAKE ONE TABLET BY MOUTH EVERY 12 HOURS 2/4/25   Dawit Shook MD   famotidine (PEPCID) 20 MG tablet Take 1 tablet by mouth Every Night.    Anitha Munoz MD   folic acid (FOLVITE) 1 MG tablet Take 1 tablet by mouth Daily. 8/25/23   Anitha Munoz MD   ipratropium-albuterol (DUO-NEB) 0.5-2.5 mg/3 ml nebulizer INHALE CONTENTS OF 1 VIAL USING NEBULIZER MACHINE EVERY 4 HOURS IF NEEDED FOR WHEEZING. 12/23/24   Ade Ying APRN   methocarbamol (ROBAXIN) 500 MG tablet TAKE ONE TABLET BY MOUTH THREE TIMES A DAY IF NEEDED FOR MUSCLE SPASMS. 6/18/24   Joshua Lee MD   metoprolol tartrate (LOPRESSOR) 25 MG tablet TAKE 1 TABLET BY MOUTH 2 (TWO) TIMES A DAY. 4/10/24   Joshua Lee MD   montelukast (SINGULAIR) 10 MG tablet TAKE 1 TABLET BY MOUTH EVERY NIGHT. 2/20/25   Ade Ying APRN   multivitamin with minerals tablet tablet Take 1 tablet by mouth Daily.    Anitha Munoz MD   olmesartan-hydrochlorothiazide (BENICAR HCT) 40-12.5 MG per tablet TAKE 1 TABLET BY MOUTH DAILY. 12/9/24   Joshua Lee MD   Omeprazole Magnesium (PRILOSEC OTC PO) Take 20 mg by mouth Daily.    Anitha Munoz MD   PARoxetine (PAXIL) 20 MG tablet TAKE 1 TABLET BY MOUTH EVERY MORNING. 7/17/24   Joshua Lee MD   pravastatin (PRAVACHOL) 10 MG tablet TAKE 1 TABLET BY MOUTH EVERY NIGHT. 8/19/24   Margarita Perez APRN   saccharomyces boulardii (FLORASTOR) 250 MG capsule Take 1 capsule by mouth 2  "(Two) Times a Day.    Provider, MD Anitha   traMADol (ULTRAM) 50 MG tablet Take 1 tablet by mouth Every 6 (Six) Hours As Needed for Moderate Pain . 9/21/21   Joshua Lee MD Treara Rose 200-62.5-25 MCG/ACT inhaler INHALE 1 PUFF BY MOUTH EVERY DAY 12/11/24   Joshua Lee MD        Social History:   Social History     Tobacco Use    Smoking status: Never     Passive exposure: Never    Smokeless tobacco: Never    Tobacco comments:     Grew up in smoking environm.   Vaping Use    Vaping status: Never Used   Substance Use Topics    Alcohol use: No    Drug use: Never         Review of Systems:  Review of Systems   Constitutional:  Negative for chills and fever.   HENT:  Negative for congestion, ear pain and sore throat.    Eyes:  Negative for pain.   Respiratory:  Negative for cough, chest tightness and shortness of breath.    Cardiovascular:  Positive for chest pain.   Gastrointestinal:  Negative for abdominal pain, diarrhea, nausea and vomiting.   Genitourinary:  Negative for flank pain and hematuria.   Musculoskeletal:  Positive for arthralgias. Negative for joint swelling.   Skin:  Positive for wound. Negative for pallor.   Neurological:  Positive for headaches. Negative for seizures.   All other systems reviewed and are negative.       Physical Exam:  /68 (BP Location: Left arm, Patient Position: Lying)   Pulse 67   Temp 98.3 °F (36.8 °C) (Oral)   Resp 14   Ht 165.1 cm (65\")   Wt 77.2 kg (170 lb 3.1 oz)   SpO2 98%   BMI 28.32 kg/m²     Physical Exam    Vital signs were reviewed under triage note.  General appearance - Patient appears well-developed and well-nourished.  Patient is in no acute distress.  Head - Normocephalic.  Patient has a moderate size right forehead hematoma.  Pupils - Equal, round, reactive to light.  Extraocular muscles are intact.  Conjunctiva is clear.  Nasal - Normal inspection.  No evidence of trauma or epistaxis.  Tympanic membranes - Gray, intact " without erythema or retractions.  Oral mucosa - Pink and moist without lesions or erythema.  Uvula is midline.  Chest wall - Atraumatic.  Chest wall is tender to palpation along the lower anterior right chest wall..  There are no vesicular rashes noted.  Neck - Supple.  Trachea was midline.  There is no palpable lymphadenopathy or thyromegaly.  There are no meningeal signs  Lungs - Clear to auscultation and percussion bilaterally.  Heart - Regular rate and rhythm without any murmurs, clicks, or gallops.  Abdomen - Soft.  Bowel sounds are present.  There is no palpable tenderness.  There is no rebound, guarding, or rigidity.  There are no palpable masses.  There are no pulsatile masses.  Back - Spine is straight and midline.  There is no CVA tenderness.  Extremities - Intact x4.  Patient has some soft tissue swelling involving the right wrist with limited range of motion due to pain.  There is no palpable edema.  Pulses are intact x4 and equal.  Neurologic - Patient is awake, alert, and oriented x3.  Cranial nerves II through XII are grossly intact.  Motor and sensory functions grossly intact.  Cerebellar function was normal.  Integument - There are no rashes.  There are no petechia or purpura lesions noted.  There are no vesicular lesions noted.          Procedures:  Procedures      Medical Decision Making:      Comorbidities that affect care:    GERD, SVT, neuropathy, pacemaker, paroxysmal atrial fibrillation, coronary artery disease, hypertension, anemia    External Notes reviewed:    Previous Clinic Note: Office visit with Veronica Montana 01/20/2025 was reviewed by me.      The following orders were placed and all results were independently analyzed by me:  Orders Placed This Encounter   Procedures    Splint Application    [facility] 04. Sling & Swathe (); Right    XR Wrist 3+ View Right    XR Hip With or Without Pelvis 2 - 3 View Right    XR Chest 1 View    CT Head Without Contrast    XR Ribs 2 View  Right    [place] Draw    Comprehensive Metabolic Panel    CBC Auto Differential    Insert peripheral IV    CBC & Differential    Green Top (Gel)    Lavender Top    Gold Top - SST    Light Blue Top       Medications Given in the Emergency Department:  Medications   sodium chloride 0.9 % flush 10 mL (has no administration in time range)   HYDROmorphone (DILAUDID) injection 0.5 mg (0.5 mg Intravenous Given 3/19/25 2041)        ED Course:     The patient was seen and evaluated in the ED by me.  The above history and physical examination was performed as documented.  Diagnostic data was obtained.  Results reviewed.  Radiographs demonstrate impacted right distal radius fracture.  There were no radiographic fractures identified on right ribs and right hip.  CT scan of the head was also negative for any acute intracranial injury.  Patient was placed in a sugar-tong splint.  Patient has been seen by Dr. Morrison in the past and she wishes to follow-up with him.  I advised that she call his office in the a.m. for follow-up appointment next week.  I also sent an epic chat message to Dr. Morrison to let him know to let his office know that she will be calling to schedule an appointment.  The patient was pleased with this.  Patient will discharged home with some hydrocodone for pain control.  Patient verbalized understanding and agreement to this treatment plan.    Labs:    Lab Results (last 24 hours)       Procedure Component Value Units Date/Time    CBC & Differential [[id number]]  (Abnormal) Collected: 03/19/25 1844    Specimen: Blood Updated: 03/19/25 1917    Narrative:      The following orders were created for panel order CBC & Differential.  Procedure                               Abnormality         Status                     ---------                               -----------         ------                     CBC Auto Differential[[id number]]        Abnormal            Final result                 Please view results for these  tests on the individual orders.    Comprehensive Metabolic Panel [[id number]]  (Abnormal) Collected: 03/19/25 1844    Specimen: Blood Updated: 03/19/25 1912     Glucose 105 mg/dL      BUN 19 mg/dL      Creatinine 0.94 mg/dL      Sodium 134 mmol/L      Potassium 4.3 mmol/L      Chloride 99 mmol/L      CO2 26.4 mmol/L      Calcium 9.4 mg/dL      Total Protein 6.4 g/dL      Albumin 4.0 g/dL      ALT (SGPT) 16 U/L      AST (SGOT) 20 U/L      Alkaline Phosphatase 87 U/L      Total Bilirubin 0.8 mg/dL      Globulin 2.4 gm/dL      A/G Ratio 1.7 g/dL      BUN/Creatinine Ratio 20.2     Anion Gap 8.6 mmol/L      eGFR 63.0 mL/min/1.73     Narrative:      GFR Categories in Chronic Kidney Disease (CKD)      GFR Category          GFR (mL/min/1.73)    Interpretation  G1                     90 or greater         Normal or high (1)  G2                      60-89                Mild decrease (1)  G3a                   45-59                Mild to moderate decrease  G3b                   30-44                Moderate to severe decrease  G4                    15-29                Severe decrease  G5                    14 or less           Kidney failure          (1)In the absence of evidence of kidney disease, neither GFR category G1 or G2 fulfill the criteria for CKD.    eGFR calculation 2021 CKD-EPI creatinine equation, which does not include race as a factor    CBC Auto Differential [[id number]]  (Abnormal) Collected: 03/19/25 1844    Specimen: Blood Updated: 03/19/25 1917     WBC 12.18 10*3/mm3      RBC 5.78 10*6/mm3      Hemoglobin 11.3 g/dL      Hematocrit 36.5 %      MCV 63.1 fL      MCH 19.6 pg      MCHC 31.0 g/dL      RDW 17.8 %      RDW-SD 36.2 fl      MPV 10.5 fL      Platelets 278 10*3/mm3      Neutrophil % 74.7 %      Lymphocyte % 11.2 %      Monocyte % 9.0 %      Eosinophil % 2.7 %      Basophil % 0.3 %      Immature Grans % 2.1 %      Neutrophils, Absolute 9.09 10*3/mm3      Lymphocytes, Absolute 1.37 10*3/mm3       Monocytes, Absolute 1.10 10*3/mm3      Eosinophils, Absolute 0.33 10*3/mm3      Basophils, Absolute 0.04 10*3/mm3      Immature Grans, Absolute 0.25 10*3/mm3      nRBC 0.0 /100 WBC              Imaging:    XR Ribs 2 View Right  Result Date: 3/19/2025  XR RIBS 2 VW RIGHT Date of Exam: 3/19/2025 9:04 PM EDT Indication: Rib pain after fall Comparison: Chest radiograph 3/19/2025 Findings: Partially imaged pacemaker. Mediastinum: Partially imaged mediastinum appears otherwise unchanged Lungs: Imaged lungs appear clear without focal consolidation appreciated. Pleura: No visible right pleural effusion or pneumothorax. Bones and soft tissues: No acute, displaced fracture seen.     Impression: No acute, displaced right-sided rib fracture. Electronically Signed: Oral John  3/19/2025 9:33 PM EDT  Workstation ID: [id number]    CT Head Without Contrast  Result Date: 3/19/2025  CT HEAD WO CONTRAST Date of Exam: 3/19/2025 7:14 PM EDT Indication: fall, head injury. Comparison: 6/15/2021 Technique: Axial CT images were obtained of the head without contrast administration.  Reconstructed coronal and sagittal images were also obtained. Automated exposure control and iterative construction methods were used. Findings: No intracranial hemorrhage. Gray-white matter differentiation is maintained without evidence of an acute infarction. Multiple foci of decreased attenuation are present within the subcortical, deep cerebral, and periventricular white matter consistent with chronic small vessel/microangiopathic ischemic changes. No extra-axial mass or collection. The ventricles and sulci are prominent commensurate with involutional changes. The posterior fossa appears grossly normal. Sellar and suprasellar structures are normal. Lens replacements. Small air-fluid level within the left maxillary sinus. Partial opacification of the right maxillary sinus with fluid. The bony calvarium is intact.     Impression: No acute intracranial  pathology. Electronically Signed: Clint Fajardo MD  3/19/2025 7:30 PM EDT  Workstation ID: [id number]    XR Hip With or Without Pelvis 2 - 3 View Right  Result Date: 3/19/2025  XR HIP W OR WO PELVIS 2-3 VIEW RIGHT Date of Exam: 3/19/2025 6:52 PM EDT Indication: fall Comparison: CT abdomen and pelvis 1/4/2017. Findings: There are postoperative changes from right hip arthroplasty. No acute fracture or dislocation is identified. Degenerative changes are included in the lower lumbar spine.     Impression: No acute osseous abnormality identified of the right hip. Electronically Signed: Tala Huerta  3/19/2025 7:23 PM EDT  Workstation ID: [id number]    XR Chest 1 View  Result Date: 3/19/2025  XR CHEST 1 VW Date of Exam: 3/19/2025 7:00 PM EDT Indication: fall Comparison: None available. Findings: Left chest wall pacemaker in stable position Mediastinum: Cardiac silhouette appears unchanged and enlarged Lungs: The lungs appear clear without focal consolidation appreciated. Pleura: No pleural effusion or pneumothorax. Bones and soft tissues: No acute, displaced fracture seen.     Impression: No radiographic evidence of acute traumatic injury to the chest. Electronically Signed: Oral John  3/19/2025 7:21 PM EDT  Workstation ID: [id number]    XR Wrist 3+ View Right  Result Date: 3/19/2025  XR WRIST 3+ VW RIGHT Date of Exam: 3/19/2025 6:49 PM EDT Indication: fall Comparison: Right wrist x-rays 9/8/2020 Findings: Diffuse osteopenia appears increased compared to 2020 x-rays. There is an impacted, comminuted, intra-articular fracture of the distal radial metaphysis and epiphysis. No additional fractures are seen. There are multifocal arthritic changes of carpal metacarpal joints, metacarpal phalangeal joints, and included thumb interphalangeal joint.     Impression: Impacted, comminuted, intra-articular fracture of the distal radius. Electronically Signed: Tala Huerta  3/19/2025 7:20 PM EDT  Workstation ID:  [id number]        Differential Diagnosis and Discussion:    Orthopedic Injuries: Differential diagnosis includes but is not limited to fractures, soft tissue injuries, dislocations, contusions, ligamentous injuries, tendon injuries, nerve injuries, compartment syndrome, bursitis, and vascular injuries.  Trauma:  Differential diagnosis considered but not limited to were subarachnoid hemorrhage, intracranial bleeding, pneumothorax, cardiac contusion, lung contusion, intra-abdominal bleeding, and compartment syndrome of any extremity or other significant traumatic pathology    Labs were collected in the emergency department and all labs were reviewed and interpreted by me.  X-ray were performed in the emergency department and all X-ray impressions were independently interpreted by me.  CT scan was performed in the emergency department and the CT scan radiology impression was interpreted by me.    MDM     Amount and/or Complexity of Data Reviewed  Clinical lab tests: reviewed  Tests in the radiology section of CPT®: reviewed             Patient Care Considerations:          Consultants/Shared Management Plan:    None    Social Determinants of Health:    Patient is independent, reliable, and has access to care.       Disposition and Care Coordination:    Discharged: The patient is suitable and stable for discharge with no need for consideration of admission.    I have explained the patient´s condition, diagnoses and treatment plan based on the information available to me at this time. I have answered questions and addressed any concerns. The patient has a good  understanding of the patient´s diagnosis, condition, and treatment plan as can be expected at this point. The vital signs have been stable. The patient´s condition is stable and appropriate for discharge from the emergency department.      The patient will pursue further outpatient evaluation with the primary care physician or other designated or consulting  physician as outlined in the discharge instructions. They are agreeable to this plan of care and follow-up instructions have been explained in detail. The patient has received these instructions in written format and has expressed an understanding of the discharge instructions. The patient is aware that any significant change in condition or worsening of symptoms should prompt an immediate return to this or the closest emergency department or call to 911.  I have explained discharge medications and the need for follow up with the patient/caretakers. This was also printed in the discharge instructions. Patient was discharged with the following medications and follow up:      Medication List      No changes were made to your prescriptions during this visit.      Austin Morrison MD  [address] [facility]  [place] [address]  [phone]    Schedule an appointment as soon as possible for a visit         Final diagnoses:   Closed fracture of right wrist, initial encounter   Contusion of rib on right side, initial encounter   Contusion of forehead, initial encounter   Contusion of right hip, initial encounter   Fall on same level from slipping, tripping or stumbling, initial encounter        ED Disposition       ED Disposition   Discharge    Condition   Stable    Comment   --               This medical record created using voice recognition software.             Louis Perry DO  03/21/25 1132

## 2025-03-20 NOTE — DISCHARGE INSTRUCTIONS
Apply cool compresses to all affected areas.  Apply for 20 to 30 minutes 3-5 times per day as needed.  Elevate right wrist when possible.  Apply cool compresses to the wrist but apply for 45 minutes at a time as it will take longer for the cold to penetrate to the skin.  Take the hydrocodone prescription as directed for pain control.  Consider taking a stool softener as this will cause constipation.  Call Dr. Morrison's office tomorrow to schedule follow-up appointment next week.  Return to the ER for any new symptoms or any other concerns that may arise.

## 2025-03-21 ENCOUNTER — TELEPHONE (OUTPATIENT)
Dept: ORTHOPEDIC SURGERY | Facility: CLINIC | Age: 75
End: 2025-03-21

## 2025-03-21 ENCOUNTER — TELEPHONE (OUTPATIENT)
Dept: CARDIOLOGY | Facility: CLINIC | Age: 75
End: 2025-03-21
Payer: MEDICARE

## 2025-03-21 ENCOUNTER — PREP FOR SURGERY (OUTPATIENT)
Dept: OTHER | Facility: HOSPITAL | Age: 75
End: 2025-03-21
Payer: MEDICARE

## 2025-03-21 ENCOUNTER — OFFICE VISIT (OUTPATIENT)
Dept: ORTHOPEDIC SURGERY | Facility: CLINIC | Age: 75
End: 2025-03-21
Payer: MEDICARE

## 2025-03-21 VITALS — OXYGEN SATURATION: 92 % | HEART RATE: 79 BPM | WEIGHT: 170 LBS | HEIGHT: 65 IN | BODY MASS INDEX: 28.32 KG/M2

## 2025-03-21 DIAGNOSIS — S62.101A CLOSED FRACTURE OF RIGHT WRIST, INITIAL ENCOUNTER: Primary | ICD-10-CM

## 2025-03-21 RX ORDER — HYDROCODONE BITARTRATE AND ACETAMINOPHEN 7.5; 325 MG/1; MG/1
1 TABLET ORAL EVERY 6 HOURS PRN
Qty: 20 TABLET | Refills: 0 | Status: SHIPPED | OUTPATIENT
Start: 2025-03-21

## 2025-03-21 NOTE — TELEPHONE ENCOUNTER
Procedure:R wrist ORIF     Med Directive: Eliquis     PMH: afib prior ablation, HTN, HLD, mild aortic stenosis      Last Seen: 1/22/2025

## 2025-03-21 NOTE — PROGRESS NOTES
"Chief Complaint  Pain and Initial Evaluation of the Right Wrist    Subjective          Suzanna Santos presents to [facility] for   History of Present Illness    Pat presents today for evaluation of her right wrist.  She sustained a fall on 3/19 where she landed onto the right side.  She had immediate pain in the wrist.  She was evaluated in the ER.  She also had rib x-rays and hip x-rays that were negative.  She has severe pain in the wrist with radiation into the fingers.  She denies any wounds.  No prior wrist surgery per her report.      Allergies   Allergen Reactions    Doxycycline Hives     .    Lariam [Mefloquine] Hives     .    Lisinopril Hives     .    Talwin [Pentazocine] Hallucinations        Social History     Socioeconomic History    Marital status:    Tobacco Use    Smoking status: Never     Passive exposure: Never    Smokeless tobacco: Never    Tobacco comments:     Grew up in smoking environm.   Vaping Use    Vaping status: Never Used   Substance and Sexual Activity    Alcohol use: No    Drug use: Never    Sexual activity: Yes     Partners: Male     Comment: age        I reviewed the patient's chief complaint, history of present illness, review of systems, past medical history, surgical history, family history, social history, medications, and allergy list.     REVIEW OF SYSTEMS    Constitutional: Denies fevers, chills, weight loss  Cardiovascular: Denies chest pain, shortness of breath  Skin: Denies rashes, acute skin changes  Neurologic: Denies headache, loss of consciousness  MSK: Right wrist pain      Objective   Vital Signs:   Pulse 79   Ht 165.1 cm (65\")   Wt 77.1 kg (170 lb)   SpO2 92%   BMI 28.29 kg/m²     Body mass index is 28.29 kg/m².    Physical Exam    General: Alert. No acute distress.   Right upper extremity: Moderate swelling in the fingertips.  No wounds noted.  Intact active finger flexion and extension.  Arthritic deformities noted.  " Palpable radial pulse.  Nontender elbow.  Nontender proximal humerus.    Procedures    Imaging Results (Most Recent)       None                     Assessment and Plan        XR Ribs 2 View Right  Result Date: 3/19/2025  Narrative: XR RIBS 2 VW RIGHT Date of Exam: 3/19/2025 9:04 PM EDT Indication: Rib pain after fall Comparison: Chest radiograph 3/19/2025 Findings: Partially imaged pacemaker. Mediastinum: Partially imaged mediastinum appears otherwise unchanged Lungs: Imaged lungs appear clear without focal consolidation appreciated. Pleura: No visible right pleural effusion or pneumothorax. Bones and soft tissues: No acute, displaced fracture seen.     Impression: Impression: No acute, displaced right-sided rib fracture. Electronically Signed: Oral John  3/19/2025 9:33 PM EDT  Workstation ID: [id number]    CT Head Without Contrast  Result Date: 3/19/2025  Narrative: CT HEAD WO CONTRAST Date of Exam: 3/19/2025 7:14 PM EDT Indication: fall, head injury. Comparison: 6/15/2021 Technique: Axial CT images were obtained of the head without contrast administration.  Reconstructed coronal and sagittal images were also obtained. Automated exposure control and iterative construction methods were used. Findings: No intracranial hemorrhage. Gray-white matter differentiation is maintained without evidence of an acute infarction. Multiple foci of decreased attenuation are present within the subcortical, deep cerebral, and periventricular white matter consistent with chronic small vessel/microangiopathic ischemic changes. No extra-axial mass or collection. The ventricles and sulci are prominent commensurate with involutional changes. The posterior fossa appears grossly normal. Sellar and suprasellar structures are normal. Lens replacements. Small air-fluid level within the left maxillary sinus. Partial opacification of the right maxillary sinus with fluid. The bony calvarium is intact.     Impression: Impression: No acute  intracranial pathology. Electronically Signed: Clint Fajardo MD  3/19/2025 7:30 PM EDT  Workstation ID: [id number]    XR Hip With or Without Pelvis 2 - 3 View Right  Result Date: 3/19/2025  Narrative: XR HIP W OR WO PELVIS 2-3 VIEW RIGHT Date of Exam: 3/19/2025 6:52 PM EDT Indication: fall Comparison: CT abdomen and pelvis 1/4/2017. Findings: There are postoperative changes from right hip arthroplasty. No acute fracture or dislocation is identified. Degenerative changes are included in the lower lumbar spine.     Impression: Impression: No acute osseous abnormality identified of the right hip. Electronically Signed: Talasantos Huerta  3/19/2025 7:23 PM EDT  Workstation ID: [id number]    XR Chest 1 View  Result Date: 3/19/2025  Narrative: XR CHEST 1 VW Date of Exam: 3/19/2025 7:00 PM EDT Indication: fall Comparison: None available. Findings: Left chest wall pacemaker in stable position Mediastinum: Cardiac silhouette appears unchanged and enlarged Lungs: The lungs appear clear without focal consolidation appreciated. Pleura: No pleural effusion or pneumothorax. Bones and soft tissues: No acute, displaced fracture seen.     Impression: Impression: No radiographic evidence of acute traumatic injury to the chest. Electronically Signed: Oral John  3/19/2025 7:21 PM EDT  Workstation ID: [id number]    XR Wrist 3+ View Right  Result Date: 3/19/2025  Narrative: XR WRIST 3+ VW RIGHT Date of Exam: 3/19/2025 6:49 PM EDT Indication: fall Comparison: Right wrist x-rays 9/8/2020 Findings: Diffuse osteopenia appears increased compared to 2020 x-rays. There is an impacted, comminuted, intra-articular fracture of the distal radial metaphysis and epiphysis. No additional fractures are seen. There are multifocal arthritic changes of carpal metacarpal joints, metacarpal phalangeal joints, and included thumb interphalangeal joint.     Impression: Impression: Impacted, comminuted, intra-articular fracture of the distal radius. Electronically  Signed: Tala Huerta  3/19/2025 7:20 PM EDT  Workstation ID: [id number]       Diagnoses and all orders for this visit:    1. Closed fracture of right wrist, initial encounter (Primary)  -     HYDROcodone-acetaminophen (NORCO) 7.5-325 MG per tablet; Take 1 tablet by mouth Every 6 (Six) Hours As Needed for Moderate Pain or Severe Pain.  Dispense: 20 tablet; Refill: 0    Other orders  -     naloxone (NARCAN) 4 MG/0.1ML nasal spray; Call 911. Don't prime. [place] in 1 nostril for overdose. Repeat in 2-3 minutes in other nostril if no or minimal breathing/responsiveness.  Dispense: 2 each; Refill: 0        We reviewed her previous imaging.  We discussed treatment options.  We discussed both operative and nonoperative management.  We discussed the risks, benefits, indications, alternatives to open reduction internal fixation of the right distal radius fracture.  We discussed primary surgery benefits including fracture stabilization and early motion.  We discussed surgery risk including bleeding, infection, damage to nerves and blood vessels, hardware complication, nonunion, malunion, persistent pain, posttraumatic arthritis, anesthesia risk including mortality, DVT/PE, and need for additional procedures.  She elected to proceed with surgical management.  I instructed her to stop her Eliquis for 2 days prior to surgery.        Discussed surgery., Risks/benefits discussed with patient including, but not limited to: infection, bleeding, neurovascular damage, malunion, nonunion, aesthetic deformity, need for further surgery, and death., Discussed with patient the implant type being used during surgery and patient understands., Surgery pamphlet given., and Call or return if worsening symptoms.    Scribed for Johnathan Rangel MD by Gay Luis MA  03/21/2025   10:24 EDT         Follow Up       2-week postop    Patient was given instructions and counseling regarding her condition or for health maintenance advice. Please see  specific information pulled into the AVS if appropriate.     I have personally performed the services described in this document as scribed by the above individual and it is both accurate and complete. Johnathan Rangel MD 03/21/25 12:47 EDT

## 2025-03-21 NOTE — TELEPHONE ENCOUNTER
SPOKE WITH PATIENT, LET HER KNOW THAT DR. RUANO HAD CLEARED HER FOR WRIST ORIF AND SHE CAN HOLD ELIQUIS FOR 2 DAYS PRIOR TO SURGERY. SHE VOICED UNDERSTANDING.

## 2025-03-21 NOTE — H&P (VIEW-ONLY)
"Chief Complaint  Pain and Initial Evaluation of the Right Wrist    Subjective          Suzanna Santos presents to [facility] for   History of Present Illness    Pat presents today for evaluation of her right wrist.  She sustained a fall on 3/19 where she landed onto the right side.  She had immediate pain in the wrist.  She was evaluated in the ER.  She also had rib x-rays and hip x-rays that were negative.  She has severe pain in the wrist with radiation into the fingers.  She denies any wounds.  No prior wrist surgery per her report.      Allergies   Allergen Reactions    Doxycycline Hives     .    Lariam [Mefloquine] Hives     .    Lisinopril Hives     .    Talwin [Pentazocine] Hallucinations        Social History     Socioeconomic History    Marital status:    Tobacco Use    Smoking status: Never     Passive exposure: Never    Smokeless tobacco: Never    Tobacco comments:     Grew up in smoking environm.   Vaping Use    Vaping status: Never Used   Substance and Sexual Activity    Alcohol use: No    Drug use: Never    Sexual activity: Yes     Partners: Male     Comment: age        I reviewed the patient's chief complaint, history of present illness, review of systems, past medical history, surgical history, family history, social history, medications, and allergy list.     REVIEW OF SYSTEMS    Constitutional: Denies fevers, chills, weight loss  Cardiovascular: Denies chest pain, shortness of breath  Skin: Denies rashes, acute skin changes  Neurologic: Denies headache, loss of consciousness  MSK: Right wrist pain      Objective   Vital Signs:   Pulse 79   Ht 165.1 cm (65\")   Wt 77.1 kg (170 lb)   SpO2 92%   BMI 28.29 kg/m²     Body mass index is 28.29 kg/m².    Physical Exam    General: Alert. No acute distress.   Right upper extremity: Moderate swelling in the fingertips.  No wounds noted.  Intact active finger flexion and extension.  Arthritic deformities noted.  " Palpable radial pulse.  Nontender elbow.  Nontender proximal humerus.    Procedures    Imaging Results (Most Recent)       None                     Assessment and Plan        XR Ribs 2 View Right  Result Date: 3/19/2025  Narrative: XR RIBS 2 VW RIGHT Date of Exam: 3/19/2025 9:04 PM EDT Indication: Rib pain after fall Comparison: Chest radiograph 3/19/2025 Findings: Partially imaged pacemaker. Mediastinum: Partially imaged mediastinum appears otherwise unchanged Lungs: Imaged lungs appear clear without focal consolidation appreciated. Pleura: No visible right pleural effusion or pneumothorax. Bones and soft tissues: No acute, displaced fracture seen.     Impression: Impression: No acute, displaced right-sided rib fracture. Electronically Signed: Oral John  3/19/2025 9:33 PM EDT  Workstation ID: [id number]    CT Head Without Contrast  Result Date: 3/19/2025  Narrative: CT HEAD WO CONTRAST Date of Exam: 3/19/2025 7:14 PM EDT Indication: fall, head injury. Comparison: 6/15/2021 Technique: Axial CT images were obtained of the head without contrast administration.  Reconstructed coronal and sagittal images were also obtained. Automated exposure control and iterative construction methods were used. Findings: No intracranial hemorrhage. Gray-white matter differentiation is maintained without evidence of an acute infarction. Multiple foci of decreased attenuation are present within the subcortical, deep cerebral, and periventricular white matter consistent with chronic small vessel/microangiopathic ischemic changes. No extra-axial mass or collection. The ventricles and sulci are prominent commensurate with involutional changes. The posterior fossa appears grossly normal. Sellar and suprasellar structures are normal. Lens replacements. Small air-fluid level within the left maxillary sinus. Partial opacification of the right maxillary sinus with fluid. The bony calvarium is intact.     Impression: Impression: No acute  intracranial pathology. Electronically Signed: Clint Fajardo MD  3/19/2025 7:30 PM EDT  Workstation ID: [id number]    XR Hip With or Without Pelvis 2 - 3 View Right  Result Date: 3/19/2025  Narrative: XR HIP W OR WO PELVIS 2-3 VIEW RIGHT Date of Exam: 3/19/2025 6:52 PM EDT Indication: fall Comparison: CT abdomen and pelvis 1/4/2017. Findings: There are postoperative changes from right hip arthroplasty. No acute fracture or dislocation is identified. Degenerative changes are included in the lower lumbar spine.     Impression: Impression: No acute osseous abnormality identified of the right hip. Electronically Signed: Talasantos Huerta  3/19/2025 7:23 PM EDT  Workstation ID: [id number]    XR Chest 1 View  Result Date: 3/19/2025  Narrative: XR CHEST 1 VW Date of Exam: 3/19/2025 7:00 PM EDT Indication: fall Comparison: None available. Findings: Left chest wall pacemaker in stable position Mediastinum: Cardiac silhouette appears unchanged and enlarged Lungs: The lungs appear clear without focal consolidation appreciated. Pleura: No pleural effusion or pneumothorax. Bones and soft tissues: No acute, displaced fracture seen.     Impression: Impression: No radiographic evidence of acute traumatic injury to the chest. Electronically Signed: Oral John  3/19/2025 7:21 PM EDT  Workstation ID: [id number]    XR Wrist 3+ View Right  Result Date: 3/19/2025  Narrative: XR WRIST 3+ VW RIGHT Date of Exam: 3/19/2025 6:49 PM EDT Indication: fall Comparison: Right wrist x-rays 9/8/2020 Findings: Diffuse osteopenia appears increased compared to 2020 x-rays. There is an impacted, comminuted, intra-articular fracture of the distal radial metaphysis and epiphysis. No additional fractures are seen. There are multifocal arthritic changes of carpal metacarpal joints, metacarpal phalangeal joints, and included thumb interphalangeal joint.     Impression: Impression: Impacted, comminuted, intra-articular fracture of the distal radius. Electronically  Signed: Tala Huerta  3/19/2025 7:20 PM EDT  Workstation ID: [id number]       Diagnoses and all orders for this visit:    1. Closed fracture of right wrist, initial encounter (Primary)  -     HYDROcodone-acetaminophen (NORCO) 7.5-325 MG per tablet; Take 1 tablet by mouth Every 6 (Six) Hours As Needed for Moderate Pain or Severe Pain.  Dispense: 20 tablet; Refill: 0    Other orders  -     naloxone (NARCAN) 4 MG/0.1ML nasal spray; Call 911. Don't prime. [place] in 1 nostril for overdose. Repeat in 2-3 minutes in other nostril if no or minimal breathing/responsiveness.  Dispense: 2 each; Refill: 0        We reviewed her previous imaging.  We discussed treatment options.  We discussed both operative and nonoperative management.  We discussed the risks, benefits, indications, alternatives to open reduction internal fixation of the right distal radius fracture.  We discussed primary surgery benefits including fracture stabilization and early motion.  We discussed surgery risk including bleeding, infection, damage to nerves and blood vessels, hardware complication, nonunion, malunion, persistent pain, posttraumatic arthritis, anesthesia risk including mortality, DVT/PE, and need for additional procedures.  She elected to proceed with surgical management.  I instructed her to stop her Eliquis for 2 days prior to surgery.        Discussed surgery., Risks/benefits discussed with patient including, but not limited to: infection, bleeding, neurovascular damage, malunion, nonunion, aesthetic deformity, need for further surgery, and death., Discussed with patient the implant type being used during surgery and patient understands., Surgery pamphlet given., and Call or return if worsening symptoms.    Scribed for Johnathan Rangel MD by Gay Luis MA  03/21/2025   10:24 EDT         Follow Up       2-week postop    Patient was given instructions and counseling regarding her condition or for health maintenance advice. Please see  specific information pulled into the AVS if appropriate.     I have personally performed the services described in this document as scribed by the above individual and it is both accurate and complete. Johnathan Rangel MD 03/21/25 12:47 EDT

## 2025-03-24 NOTE — PRE-PROCEDURE INSTRUCTIONS
PATIENT INSTRUCTED TO BE:    - NOTHING TO EAT AFTER MIDNIGHT OR CHEW, EXCEPT CAN HAVE SIPS OF WATER WITH MEDICATIONS   - TO HOLD ALL VITAMINS, SUPPLEMENTS, NSAIDS FOR ONE WEEK PRIOR TO THEIR SURGICAL PROCEDURE    - DO NOT TAKE __NA____________________ 7 DAYS PRIOR TO PROCEDURE PER ANESTHESIA RECOMMENDATIONS/INSTRUCTIONS     - INSTRUCTED PT TO USE SURGICAL SOAP 1 TIME THE NIGHT PRIOR TO SURGERY __1-52-69_________ OR THE AM OF SURGERY __3-64-45___________   USE THE SOAP FROM NECK TO TOES, AVOID THEIR FACE, HAIR, AND PRIVATE PARTS. IF USE THE SOAP THE NIGHT PRIOR TO SURGERY, CHANGE BED LINENS AND NO PETS IN THE BED.     INSTRUCTED NO LOTIONS, JEWELRY, PIERCINGS,  NAIL POLISH, OR DEODORANT DAY OF SURGERY    - IF DIABETIC, CHECK BLOOD GLUCOSE IF LESS THAN 70 OR HAVING SYMPTOMS CALL THE PREOP AREA FOR INSTRUCTIONS ON AM OF SURGERY [phone])    -INSTRUCTED TO TAKE THE FOLLOWING MEDICATIONS THE DAY OF SURGERY WITH SIPS OF WATER:       INHALERS, NEBS, PEPCID, NORCO PRN, ROBAXIN PRN, METOPROLOL, PRILOSEC, PAXIL, TRAMADOL PRN     HOLD ELIQUIS PER INSTRUCTIONS OF DR CHI AND DR RUANO'S CLEARANCE ( LAST DOSE TAKEN PER PATIENT WAS 3-22-25)     - DO NOT BRING ANY MEDICATIONS WITH YOU TO THE HOSPITAL THE DAY OF SURGERY, EXCEPT IF USE INHALERS. BRING INHALERS DAY OF SURGERY       - BRING CPAP OR BIPAP TO THE HOSPITAL ONLY IF YOU ARE SPENDING THE NIGHT    - DO NOT SMOKE OR VAPE 24 HOURS PRIOR TO PROCEDURE PER ANESTHESIA REQUEST     -MAKE SURE YOU HAVE A RIDE HOME OR SOMEONE TO STAY WITH YOU THE DAY OF THE PROCEDURE AFTER YOU GO HOME     - FOLLOW ANY OTHER INSTRUCTIONS GIVEN TO YOU BY YOUR SURGEON'S OFFICE.     - DAY OF SURGERY __[id number]__________,COME TO [facility] ([facility]),  PARK IN THE OPEN LOT, COME TO [facility]/ [facility], FIRST FLOOR, CHECK IN AT THE DESK FOR REGISTRATION/ SURGERY      - YOU WILL RECEIVE A PHONE CALL THE DAY PRIOR TO SURGERY BETWEEN 1PM AND 4 PM WITH ARRIVAL TIME, IF YOUR SURGERY IS ON  A MONDAY YOU WILL RECEIVE A CALL THE FRIDAY PRIOR TO SURGERY DATE    - BRING CASH OR CREDIT CARD FOR COPAYMENT OF MEDICATIONS AFTER SURGERY IF YOU USE THE HOSPITAL PHARMACY ([facility] TO BED)    - PREADMISSION TESTING NURSE JUVENTINO DELGADO -[phone] IF HAVE ANY QUESTIONS     -PATIENT PROVIDED THE NUMBER FOR PREOP SURGICAL DEPT IF HAD QUESTIONS AFTER HOURS PRIOR TO SURGERY ([phone]).  INFORMED PT IF NO ANSWER, LEAVE A MESSAGE AND SOMEONE WILL RETURN THEIR CALL       PATIENT VERBALIZED UNDERSTANDING

## 2025-03-25 ENCOUNTER — APPOINTMENT (OUTPATIENT)
Dept: GENERAL RADIOLOGY | Facility: HOSPITAL | Age: 75
End: 2025-03-25
Payer: MEDICARE

## 2025-03-25 ENCOUNTER — ANESTHESIA (OUTPATIENT)
Dept: PERIOP | Facility: HOSPITAL | Age: 75
End: 2025-03-25
Payer: MEDICARE

## 2025-03-25 ENCOUNTER — ANESTHESIA EVENT (OUTPATIENT)
Dept: PERIOP | Facility: HOSPITAL | Age: 75
End: 2025-03-25
Payer: MEDICARE

## 2025-03-25 ENCOUNTER — HOSPITAL ENCOUNTER (OUTPATIENT)
Facility: HOSPITAL | Age: 75
Setting detail: HOSPITAL OUTPATIENT SURGERY
Discharge: HOME | End: 2025-03-25
Attending: STUDENT IN AN ORGANIZED HEALTH CARE EDUCATION/TRAINING PROGRAM | Admitting: STUDENT IN AN ORGANIZED HEALTH CARE EDUCATION/TRAINING PROGRAM
Payer: MEDICARE

## 2025-03-25 ENCOUNTER — ANESTHESIA EVENT CONVERTED (OUTPATIENT)
Dept: ANESTHESIOLOGY | Facility: HOSPITAL | Age: 75
End: 2025-03-25
Payer: MEDICARE

## 2025-03-25 VITALS
RESPIRATION RATE: 15 BRPM | BODY MASS INDEX: 27.47 KG/M2 | TEMPERATURE: 97 F | OXYGEN SATURATION: 95 % | WEIGHT: 164.9 LBS | SYSTOLIC BLOOD PRESSURE: 105 MMHG | DIASTOLIC BLOOD PRESSURE: 60 MMHG | HEIGHT: 65 IN | HEART RATE: 65 BPM

## 2025-03-25 DIAGNOSIS — S62.101A CLOSED FRACTURE OF RIGHT WRIST, INITIAL ENCOUNTER: ICD-10-CM

## 2025-03-25 LAB
QT INTERVAL: 436 MS
QTC INTERVAL: 476 MS

## 2025-03-25 PROCEDURE — 25010000002 LIDOCAINE PF 2% 2 % SOLUTION: Performed by: NURSE ANESTHETIST, CERTIFIED REGISTERED

## 2025-03-25 PROCEDURE — 25010000002 FENTANYL CITRATE (PF) 50 MCG/ML SOLUTION: Performed by: NURSE ANESTHETIST, CERTIFIED REGISTERED

## 2025-03-25 PROCEDURE — 93005 ELECTROCARDIOGRAM TRACING: CPT | Performed by: ANESTHESIOLOGY

## 2025-03-25 PROCEDURE — 76000 FLUOROSCOPY <1 HR PHYS/QHP: CPT

## 2025-03-25 PROCEDURE — C1713 ANCHOR/SCREW BN/BN,TIS/BN: HCPCS | Performed by: STUDENT IN AN ORGANIZED HEALTH CARE EDUCATION/TRAINING PROGRAM

## 2025-03-25 PROCEDURE — 25010000002 CEFAZOLIN PER 500 MG: Performed by: STUDENT IN AN ORGANIZED HEALTH CARE EDUCATION/TRAINING PROGRAM

## 2025-03-25 PROCEDURE — 25010000002 DROPERIDOL PER 5 MG: Performed by: ANESTHESIOLOGY

## 2025-03-25 PROCEDURE — 25010000002 ONDANSETRON PER 1 MG: Performed by: NURSE ANESTHETIST, CERTIFIED REGISTERED

## 2025-03-25 PROCEDURE — 25609 OPTX DST RD XART FX/EP SEP3+: CPT | Performed by: PHYSICIAN ASSISTANT

## 2025-03-25 PROCEDURE — 25010000002 MIDAZOLAM PER 1MG: Performed by: ANESTHESIOLOGY

## 2025-03-25 PROCEDURE — 25010000002 DEXAMETHASONE PER 1 MG: Performed by: ANESTHESIOLOGY

## 2025-03-25 PROCEDURE — 25010000002 MIDAZOLAM PER 1MG

## 2025-03-25 PROCEDURE — 25010000002 PROPOFOL 10 MG/ML EMULSION: Performed by: NURSE ANESTHETIST, CERTIFIED REGISTERED

## 2025-03-25 PROCEDURE — 25609 OPTX DST RD XART FX/EP SEP3+: CPT | Performed by: STUDENT IN AN ORGANIZED HEALTH CARE EDUCATION/TRAINING PROGRAM

## 2025-03-25 PROCEDURE — 25810000003 LACTATED RINGERS PER 1000 ML: Performed by: ANESTHESIOLOGY

## 2025-03-25 DEVICE — LOCKING SCREW, FULLY THREADED,T8
Type: IMPLANTABLE DEVICE | Site: WRIST | Status: FUNCTIONAL
Brand: VARIAX

## 2025-03-25 DEVICE — KIRSCHNER WIRE: Type: IMPLANTABLE DEVICE | Site: WRIST | Status: FUNCTIONAL

## 2025-03-25 DEVICE — VOLAR DR PLATE, STANDARD, R
Type: IMPLANTABLE DEVICE | Site: WRIST | Status: FUNCTIONAL
Brand: VARIAX

## 2025-03-25 DEVICE — BONE SCREW, FULLY THREADED, T8
Type: IMPLANTABLE DEVICE | Site: WRIST | Status: FUNCTIONAL
Brand: VARIAX

## 2025-03-25 RX ORDER — ONDANSETRON 2 MG/ML
INJECTION INTRAMUSCULAR; INTRAVENOUS AS NEEDED
Status: DISCONTINUED | OUTPATIENT
Start: 2025-03-25 | End: 2025-03-25 | Stop reason: SURG

## 2025-03-25 RX ORDER — DEXAMETHASONE SODIUM PHOSPHATE 4 MG/ML
INJECTION, SOLUTION INTRA-ARTICULAR; INTRALESIONAL; INTRAMUSCULAR; INTRAVENOUS; SOFT TISSUE AS NEEDED
Status: DISCONTINUED | OUTPATIENT
Start: 2025-03-25 | End: 2025-03-25 | Stop reason: SURG

## 2025-03-25 RX ORDER — MIDAZOLAM HYDROCHLORIDE 2 MG/2ML
1 INJECTION, SOLUTION INTRAMUSCULAR; INTRAVENOUS ONCE
Status: COMPLETED | OUTPATIENT
Start: 2025-03-25 | End: 2025-03-25

## 2025-03-25 RX ORDER — DEXMEDETOMIDINE HYDROCHLORIDE 100 UG/ML
INJECTION, SOLUTION INTRAVENOUS AS NEEDED
Status: DISCONTINUED | OUTPATIENT
Start: 2025-03-25 | End: 2025-03-25 | Stop reason: SURG

## 2025-03-25 RX ORDER — ONDANSETRON 4 MG/1
4 TABLET, FILM COATED ORAL EVERY 8 HOURS PRN
Qty: 30 TABLET | Refills: 0 | Status: SHIPPED | OUTPATIENT
Start: 2025-03-25

## 2025-03-25 RX ORDER — LIDOCAINE HYDROCHLORIDE 20 MG/ML
INJECTION, SOLUTION EPIDURAL; INFILTRATION; INTRACAUDAL; PERINEURAL AS NEEDED
Status: DISCONTINUED | OUTPATIENT
Start: 2025-03-25 | End: 2025-03-25 | Stop reason: SURG

## 2025-03-25 RX ORDER — OXYCODONE HYDROCHLORIDE 5 MG/1
5 TABLET ORAL
Status: DISCONTINUED | OUTPATIENT
Start: 2025-03-25 | End: 2025-03-25 | Stop reason: HOSPADM

## 2025-03-25 RX ORDER — ONDANSETRON 2 MG/ML
4 INJECTION INTRAMUSCULAR; INTRAVENOUS ONCE AS NEEDED
Status: DISCONTINUED | OUTPATIENT
Start: 2025-03-25 | End: 2025-03-25 | Stop reason: HOSPADM

## 2025-03-25 RX ORDER — PROPOFOL 10 MG/ML
VIAL (ML) INTRAVENOUS AS NEEDED
Status: DISCONTINUED | OUTPATIENT
Start: 2025-03-25 | End: 2025-03-25 | Stop reason: SURG

## 2025-03-25 RX ORDER — MIDAZOLAM HYDROCHLORIDE 2 MG/2ML
INJECTION, SOLUTION INTRAMUSCULAR; INTRAVENOUS
Status: COMPLETED
Start: 2025-03-25 | End: 2025-03-25

## 2025-03-25 RX ORDER — BUPIVACAINE HYDROCHLORIDE AND EPINEPHRINE 5; 5 MG/ML; UG/ML
INJECTION, SOLUTION EPIDURAL; INTRACAUDAL; PERINEURAL
Status: COMPLETED | OUTPATIENT
Start: 2025-03-25 | End: 2025-03-25

## 2025-03-25 RX ORDER — SODIUM CHLORIDE, SODIUM LACTATE, POTASSIUM CHLORIDE, CALCIUM CHLORIDE 600; 310; 30; 20 MG/100ML; MG/100ML; MG/100ML; MG/100ML
20 INJECTION, SOLUTION INTRAVENOUS CONTINUOUS PRN
Status: DISCONTINUED | OUTPATIENT
Start: 2025-03-25 | End: 2025-03-25 | Stop reason: HOSPADM

## 2025-03-25 RX ORDER — AMOXICILLIN 250 MG
1 CAPSULE ORAL 2 TIMES DAILY
Qty: 10 TABLET | Refills: 0 | Status: SHIPPED | OUTPATIENT
Start: 2025-03-25 | End: 2025-03-30

## 2025-03-25 RX ORDER — PROMETHAZINE HYDROCHLORIDE 25 MG/1
25 SUPPOSITORY RECTAL ONCE AS NEEDED
Status: DISCONTINUED | OUTPATIENT
Start: 2025-03-25 | End: 2025-03-25 | Stop reason: HOSPADM

## 2025-03-25 RX ORDER — MIDAZOLAM HYDROCHLORIDE 2 MG/2ML
2 INJECTION, SOLUTION INTRAMUSCULAR; INTRAVENOUS ONCE
Status: COMPLETED | OUTPATIENT
Start: 2025-03-25 | End: 2025-03-25

## 2025-03-25 RX ORDER — DEXAMETHASONE SODIUM PHOSPHATE 4 MG/ML
INJECTION, SOLUTION INTRA-ARTICULAR; INTRALESIONAL; INTRAMUSCULAR; INTRAVENOUS; SOFT TISSUE
Status: COMPLETED | OUTPATIENT
Start: 2025-03-25 | End: 2025-03-25

## 2025-03-25 RX ORDER — EPHEDRINE SULFATE 50 MG/ML
INJECTION, SOLUTION INTRAVENOUS AS NEEDED
Status: DISCONTINUED | OUTPATIENT
Start: 2025-03-25 | End: 2025-03-25 | Stop reason: SURG

## 2025-03-25 RX ORDER — FENTANYL CITRATE 50 UG/ML
INJECTION, SOLUTION INTRAMUSCULAR; INTRAVENOUS AS NEEDED
Status: DISCONTINUED | OUTPATIENT
Start: 2025-03-25 | End: 2025-03-25 | Stop reason: SURG

## 2025-03-25 RX ORDER — DROPERIDOL 2.5 MG/ML
0.62 INJECTION, SOLUTION INTRAMUSCULAR; INTRAVENOUS ONCE
Status: COMPLETED | OUTPATIENT
Start: 2025-03-25 | End: 2025-03-25

## 2025-03-25 RX ORDER — PROMETHAZINE HYDROCHLORIDE 12.5 MG/1
25 TABLET ORAL ONCE AS NEEDED
Status: DISCONTINUED | OUTPATIENT
Start: 2025-03-25 | End: 2025-03-25 | Stop reason: HOSPADM

## 2025-03-25 RX ORDER — ACETAMINOPHEN 500 MG
1000 TABLET ORAL ONCE
Status: COMPLETED | OUTPATIENT
Start: 2025-03-25 | End: 2025-03-25

## 2025-03-25 RX ORDER — SCOPOLAMINE 1 MG/3D
1 PATCH, EXTENDED RELEASE TRANSDERMAL ONCE
Status: DISCONTINUED | OUTPATIENT
Start: 2025-03-25 | End: 2025-03-25 | Stop reason: HOSPADM

## 2025-03-25 RX ORDER — HYDROCODONE BITARTRATE AND ACETAMINOPHEN 7.5; 325 MG/1; MG/1
1 TABLET ORAL EVERY 4 HOURS PRN
Qty: 30 TABLET | Refills: 0 | Status: SHIPPED | OUTPATIENT
Start: 2025-03-25 | End: 2025-04-01 | Stop reason: SDUPTHER

## 2025-03-25 RX ADMIN — ACETAMINOPHEN 1000 MG: 500 TABLET ORAL at 12:55

## 2025-03-25 RX ADMIN — DROPERIDOL 0.62 MG: 2.5 INJECTION, SOLUTION INTRAMUSCULAR; INTRAVENOUS at 13:29

## 2025-03-25 RX ADMIN — BUPIVACAINE HYDROCHLORIDE AND EPINEPHRINE BITARTRATE 32 ML: 5; .005 INJECTION, SOLUTION EPIDURAL; INTRACAUDAL; PERINEURAL at 13:41

## 2025-03-25 RX ADMIN — ONDANSETRON 4 MG: 2 INJECTION INTRAMUSCULAR; INTRAVENOUS at 14:22

## 2025-03-25 RX ADMIN — FENTANYL CITRATE 25 MCG: 50 INJECTION, SOLUTION INTRAMUSCULAR; INTRAVENOUS at 14:07

## 2025-03-25 RX ADMIN — FENTANYL CITRATE 25 MCG: 50 INJECTION, SOLUTION INTRAMUSCULAR; INTRAVENOUS at 14:18

## 2025-03-25 RX ADMIN — DEXAMETHASONE SODIUM PHOSPHATE 8 MG: 4 INJECTION, SOLUTION INTRAMUSCULAR; INTRAVENOUS at 13:41

## 2025-03-25 RX ADMIN — FENTANYL CITRATE 25 MCG: 50 INJECTION, SOLUTION INTRAMUSCULAR; INTRAVENOUS at 15:02

## 2025-03-25 RX ADMIN — SCOLOPAMINE TRANSDERMAL SYSTEM 1 PATCH: 1 PATCH, EXTENDED RELEASE TRANSDERMAL at 12:55

## 2025-03-25 RX ADMIN — SODIUM CHLORIDE, POTASSIUM CHLORIDE, SODIUM LACTATE AND CALCIUM CHLORIDE: 600; 310; 30; 20 INJECTION, SOLUTION INTRAVENOUS at 14:00

## 2025-03-25 RX ADMIN — EPHEDRINE SULFATE 10 MG: 50 INJECTION INTRAVENOUS at 14:27

## 2025-03-25 RX ADMIN — FENTANYL CITRATE 25 MCG: 50 INJECTION, SOLUTION INTRAMUSCULAR; INTRAVENOUS at 14:51

## 2025-03-25 RX ADMIN — MIDAZOLAM HYDROCHLORIDE 1 MG: 2 INJECTION, SOLUTION INTRAMUSCULAR; INTRAVENOUS at 13:42

## 2025-03-25 RX ADMIN — LIDOCAINE HYDROCHLORIDE 40 MG: 20 INJECTION, SOLUTION INTRAVENOUS at 14:07

## 2025-03-25 RX ADMIN — PROPOFOL 90 MG: 10 INJECTION, EMULSION INTRAVENOUS at 14:09

## 2025-03-25 RX ADMIN — DEXAMETHASONE SODIUM PHOSPHATE 4 MG: 4 INJECTION, SOLUTION INTRA-ARTICULAR; INTRALESIONAL; INTRAMUSCULAR; INTRAVENOUS; SOFT TISSUE at 14:16

## 2025-03-25 RX ADMIN — SODIUM CHLORIDE 2 G: 9 INJECTION, SOLUTION INTRAVENOUS at 14:11

## 2025-03-25 RX ADMIN — MIDAZOLAM HYDROCHLORIDE 2 MG: 1 INJECTION, SOLUTION INTRAMUSCULAR; INTRAVENOUS at 13:30

## 2025-03-25 RX ADMIN — MIDAZOLAM HYDROCHLORIDE 1 MG: 1 INJECTION, SOLUTION INTRAMUSCULAR; INTRAVENOUS at 13:42

## 2025-03-25 RX ADMIN — DEXMEDETOMIDINE 20 MCG: 100 INJECTION, SOLUTION, CONCENTRATE INTRAVENOUS at 13:35

## 2025-03-25 NOTE — DISCHARGE INSTRUCTIONS
Orthopedic instructions: 1 pound lifting restriction with the operative arm.  Keep your splint on, intact, clean, dry at all times.  Ice and elevate help reduce pain and swelling.  Perform finger and elbow range of motion exercises to help prevent stiffness.  Use your sling as needed.  Monitor for increasing pain, drainage through the splint, fevers, chills.  You may resume your Eliquis on 3/26 at your regular dose.  Call the office with any concerns.  Follow-up in 2 weeks for reevaluation.

## 2025-03-25 NOTE — ANESTHESIA PROCEDURE NOTES
Peripheral Block      Patient reassessed immediately prior to procedure    Patient location during procedure: pre-op  Start time: 3/25/2025 1:26 PM  Stop time: 3/25/2025 1:41 PM  Reason for block: at surgeon's request and post-op pain management  Performed by  Anesthesiologist: Roshan Coelho MD  Preanesthetic Checklist  Completed: patient identified, IV checked, site marked, risks and benefits discussed, surgical consent, monitors and equipment checked, pre-op evaluation and timeout performed  Prep:  Pt Position: supine  Sterile barriers:alcohol skin prep, partial drape, cap, washed/disinfected hands, gloves and mask  Prep: ChloraPrep  Patient monitoring: blood pressure monitoring, continuous pulse oximetry and EKG  Procedure    Sedation: yes  Performed under: local infiltration  Guidance:ultrasound guided and nerve stimulator    ULTRASOUND INTERPRETATION.  Using ultrasound guidance a 22 G gauge needle was placed in close proximity to the brachial plexus nerve, at which point, under ultrasound guidance anesthetic was injected in the area of the nerve and spread of the anesthesia was seen on ultrasound in close proximity thereto.  There were no abnormalities seen on ultrasound; a digital image was taken; and the patient tolerated the procedure with no complications. Images:still images obtained, printed/placed on chart    Laterality:right  Block Type:axillary  Injection Technique:single-shot  Needle Type:echogenic  Needle Gauge:22 G (2in)  Resistance on Injection: none    Medications Used: dexamethasone (DECADRON) injection 4 mg/mL - Injection, Axilla Right   8 mg - 3/25/2025 1:41:00 PM  bupivacaine-EPINEPHrine PF (MARCAINE w/EPI) 0.5% -[id number] injection - Injection, Axilla Right   32 mL - 3/25/2025 1:41:00 PM      Medications  Comment:8cc of solution injected into musculotaneous nerve bundle        Post Assessment  Injection Assessment: negative aspiration for heme, no paresthesia on injection and incremental  injection  Patient Tolerance:comfortable throughout block  Complications:no  Additional Notes  The block or continuous infusion is requested by the referring physician for management of postoperative pain, or pain related to a procedure. Ultrasound guidance (deemed medically necessary). Painless injection, pt was awake and conversant during the procedure without complications. Needle and surrounding structures visualized throughout procedure. No adverse reactions or complications seen during this period. Post-procedure image showed no signs of complication, and anatomy was consistent with an uncomplicated nerve blockade.  Performed by: Roshan Coelho MD

## 2025-03-25 NOTE — OP NOTE
WRIST OPEN REDUCTION INTERNAL FIXATION  Procedure Report    Patient Name:  Suzanna Santos  YOB: 1950    Date of Surgery:  3/25/2025     Indications: Suzanna is a 74-year-old female who sustained a fall with a comminuted intra-articular right distal radius fracture.  We discussed treatment options.  We discussed nonoperative management with casting.  We discussed surgical management with open reduction internal fixation of the right distal radius fracture.  We discussed benefits of surgery including improved alignment, fracture stability, and a decreased period of immobilization.  We discussed surgical risks including bleeding, infection, damage to nerves and blood vessels, hardware complications, nonunion, malunion, persistent pain, stiffness, posttraumatic arthritis, anesthesia risk including mortality, DVT, and need for additional procedures.  The patient elected to proceed and consent was obtained.    Pre-op Diagnosis:   Closed fracture of right wrist, initial encounter [S62.101A]       Post-Op Diagnosis Codes:     * Closed fracture of right wrist, initial encounter [S62.101A]    Procedure:    Procedure(s):  OPEN REDUCTION INTERNAL FIXATION RIGHT DISTAL RADIUS intra-articular with 3 parts  Staff:  Surgeon(s):  Johnathan Rangel MD    Assistant: MARISSA Campos    Anesthesia: General with Block    Estimated Blood Loss: 5 mL    Implants:    Implant Name Type Inv. Item Serial No.  Lot No. LRB No. Used Action   PLT VOLAR DIST/RAD STD RT - [id number] Implant PLT VOLAR DIST/RAD STD RT  SHAGUFTA AASHISH  Right 1 Implanted   KWIRE SS [id number] NS - [id number] Implant KWIRE SS [id number] NS  SHAGUFTA AASHISH  Right 3 Implanted       Specimen:          None        Findings: Comminuted right intra-articular distal radius fracture    Complications: None    Description of Procedure: The patient was met in the preoperative holding area and the operative extremity was marked.  The patient underwent a  preoperative peripheral nerve block with the anesthesia team.  The patient was transported to the operating room and laid supine on the operating room table.  General anesthesia was induced without complication.  Preoperative antibiotics were administered.  An upper arm tourniquet was applied.  The right upper extremity was prepped and draped in the usual sterile fashion.  A timeout was taken to ensure the appropriate patient, procedure, and procedural site.  All were in agreement.  The right upper extremity was exsanguinated and the tourniquet was inflated to 250 mmHg.  I made a 6 cm longitudinal incision over the FCR tendon sheath for a standard FCR-based approach to the distal radius.  Sharp dissection was carried down through the skin and subcutaneous tissues.  The FCR tendon sheath was identified.  This was split longitudinally in line with the incision.  The FCR tendon was retracted ulnarly.  The floor of the FCR tendon sheath was sharply incised.  The underlying FPL muscle belly was identified and retracted ulnarly.  Deep retractors were inserted.  The pronator quadratus was identified.  This was sharply elevated off the distal radius.  A soft tissue elevator was slid proximally in anticipation of plate placement.  The fracture site was identified.  The fracture site was debrided of soft tissue and hematoma.  The fracture site was irrigated.  A [employer] elevator and manual traction was used to reduce the fracture.  The fracture was temporarily held in place with a K wire through the radial styloid.  C-arm confirmed my reduction.  I then selected a [place] variax intermediate right distal radius plate.  This was positioned on the volar surface of the distal radius and temporarily held in place with 2 K wires.  Plate positioning and reduction were confirmed with C-arm fluoroscopy.  I was satisfied with both.  I then began my fixation.  I placed 1 bicortical nonlocking screw in the oblong hole of the plate.  This  was partially inserted.  I then moved distally and placed two unicortical locking screws in the central 2 holes of the distal row of the plate.  I then applied a volar force across the distal radius and secured the proximal end of the plate down to bone by fully tightening the previously inserted nonlocking screw in the oblong hole.  This reduced the plate to bone nicely.  C-arm was brought in and confirmed appropriate hardware positioning and maintained reduction with this maneuver.  I moved distally and placed 3 additional unicortical locking screws in the distal row of the plate. The distal screws were final tightened.  I moved proximally.  I placed 2 bicortical locking screws in the remaining proximal holes.  K wires and deep retractors were removed.  C-arm fluoroscopy was used to confirm reduction and hardware positioning.  Final images were obtained in the AP and lateral planes including a 20 degree elevated view.  No intra-articular screw penetration was noted.  No hardware complications were noted overall.  Reduction was satisfactory.  The wound was then thoroughly irrigated with normal saline.  The wound was packed with a sponge and the tourniquet was released.  Hemostasis was obtained with the Bovie electrocautery.  No arterial bleeding was identified.  The patient had a palpable radial pulse and well-perfused hand.  Subcutaneous tissues were closed with 3-0 Vicryl.  Skin was closed with 4-0 nylon.  The wound was dressed with Xeroform and 4 x 4's.  A well-padded sugar tong splint was applied.  The patient was extubated and transferred to the recovery room under anesthesia guidance.  All surgical counts were correct.     Assistant: MARISSA Campos was responsible for performing the following activities: Retraction, Suction, Irrigation, Suturing, Closing, and Placing Dressing and their skilled assistance was necessary for the success of this case.    Johnathan Rangel MD     Date: 3/25/2025  Time: 15:06  EDT

## 2025-03-25 NOTE — ANESTHESIA POSTPROCEDURE EVALUATION
Patient: Suzanna Santos    Procedure Summary       Date: 03/25/25 Room / Location: [facility] OR  / [facility]    Anesthesia Start: 1359 Anesthesia Stop: 1515    Procedure: OPEN REDUCTION INTERNAL FIXATION RIGHT DISTAL RADIUS: Surgery to fix the broken right radius with plate and screws (Right: Wrist) Diagnosis:       Closed fracture of right wrist, initial encounter      (Closed fracture of right wrist, initial encounter [S62.101A])    Surgeons: Johnathan Rangel MD Provider: Roshan Coelho MD    Anesthesia Type: general ASA Status: 4            Anesthesia Type: general    Vitals  Vitals Value Taken Time   /53 03/25/25 15:28   Temp 36.1 °C (97 °F) 03/25/25 15:12   Pulse 66 03/25/25 15:29   Resp 15 03/25/25 15:12   SpO2 94 % 03/25/25 15:29   Vitals shown include unfiled device data.        Post Anesthesia Care and Evaluation    Patient location during evaluation: bedside  Patient participation: complete - patient participated  Level of consciousness: awake  Pain score: 0  Pain management: adequate    Airway patency: patent  Anesthetic complications: No anesthetic complications  PONV Status: none  Cardiovascular status: acceptable and stable  Respiratory status: acceptable  Hydration status: acceptable

## 2025-03-25 NOTE — INTERVAL H&P NOTE
H&P reviewed. The patient was examined and there are no changes to the H&P.      Electronically signed by Johnathan Rangel MD, 03/25/25, 12:13 PM EDT.

## 2025-03-25 NOTE — ANESTHESIA PREPROCEDURE EVALUATION
Anesthesia Evaluation     Patient summary reviewed and Nursing notes reviewed   history of anesthetic complications:  PONV  NPO Solid Status: > 8 hours  NPO Liquid Status: > 2 hours           Airway   Mallampati: I  TM distance: >3 FB  Neck ROM: full  No difficulty expected  Dental      Pulmonary - normal exam    breath sounds clear to auscultation  (+) pneumonia resolved , asthma,  Cardiovascular - normal exam  Exercise tolerance: good (4-7 METS)    ECG reviewed  Patient on routine beta blocker and Beta blocker given within 24 hours of surgery  Rhythm: regular  Rate: normal    (+) pacemaker pacemaker, hypertension, CAD, dysrhythmias Paroxysmal Atrial Fib, hyperlipidemia      Neuro/Psych  (+) psychiatric history Anxiety  GI/Hepatic/Renal/Endo    (+) GERD, thyroid problem hypothyroidism    Musculoskeletal     Abdominal    Substance History - negative use     OB/GYN negative ob/gyn ROS         Other   arthritis,   history of cancer    ROS/Med Hx Other: HX AFIB, SVT, PACER [facility], CAD, GERD, ASTHMA. METS>4. CLEARED CARDIAC.  ECHO 7/2024 EF  %. PACER CHECKED 12/13/24.  ELM       Phys Exam Other: Several teeth missing         ABNORMAL ECG - 8/30/22  Sinus rhythm  Prolonged GA interval  Minimal ST depression, anterolateral leads  When compared with ECG of 06-Apr-2019 18:19:53,  Significant change in rhythm  New conduction abnormality    Interpretation Summary echo 8/9/24         The study is technically difficult for diagnosis.    Left ventricular systolic function is normal. Calculated left ventricular EF = 52.4%    Left ventricular wall thickness is consistent with mild concentric hypertrophy.    The left atrial cavity is mild to moderately dilated.    Borderline  mild aortic stenosis    There is mild calcification of the aortic valve.        Latest Reference Range & Units 03/19/25 18:44   Hemoglobin 12.0 - 15.9 g/dL 11.3 (L)   Hematocrit 34.0 - 46.6 % 36.5   (L): Data is abnormally low     Latest Reference Range &  Units 03/19/25 18:44   Sodium 136 - 145 mmol/L 134 (L)   Potassium 3.5 - 5.2 mmol/L 4.3   Chloride 98 - 107 mmol/L 99   CO2 22.0 - 29.0 mmol/L 26.4   Anion Gap 5.0 - 15.0 mmol/L 8.6   BUN 8 - 23 mg/dL 19   Creatinine 0.57 - 1.00 mg/dL 0.94   BUN/Creatinine Ratio 7.0 - 25.0  20.2   eGFR >60.0 mL/min/1.73 63.0   Glucose 65 - 99 mg/dL 105 (H)   Calcium 8.6 - 10.5 mg/dL 9.4   Alkaline Phosphatase 39 - 117 U/L 87   Total Protein 6.0 - 8.5 g/dL 6.4   Albumin 3.5 - 5.2 g/dL 4.0   Globulin gm/dL 2.4   A/G Ratio g/dL 1.7   AST (SGOT) 1 - 32 U/L 20   ALT (SGPT) 1 - 33 U/L 16   Total Bilirubin 0.0 - 1.2 mg/dL 0.8   (L): Data is abnormally low  (H): Data is abnormally high        Anesthesia Plan    ASA 4     general     (Patient understands anesthesia not responsible for dental damage.)  intravenous induction     Anesthetic plan, risks, benefits, and alternatives have been provided, discussed and informed consent has been obtained with: patient.    Use of blood products discussed with patient .    Plan discussed with CRNA.        CODE STATUS:

## 2025-04-01 ENCOUNTER — TELEPHONE (OUTPATIENT)
Dept: ORTHOPEDIC SURGERY | Facility: CLINIC | Age: 75
End: 2025-04-01
Payer: MEDICARE

## 2025-04-01 DIAGNOSIS — S62.101A CLOSED FRACTURE OF RIGHT WRIST, INITIAL ENCOUNTER: ICD-10-CM

## 2025-04-01 RX ORDER — HYDROCODONE BITARTRATE AND ACETAMINOPHEN 7.5; 325 MG/1; MG/1
1 TABLET ORAL EVERY 4 HOURS PRN
Qty: 30 TABLET | Refills: 0 | Status: SHIPPED | OUTPATIENT
Start: 2025-04-01 | End: 2025-04-07

## 2025-04-03 ENCOUNTER — PROCEDURE VISIT (OUTPATIENT)
Dept: OTOLARYNGOLOGY | Facility: CLINIC | Age: 75
End: 2025-04-03
Payer: MEDICARE

## 2025-04-03 ENCOUNTER — OFFICE VISIT (OUTPATIENT)
Dept: OTOLARYNGOLOGY | Facility: CLINIC | Age: 75
End: 2025-04-03
Payer: MEDICARE

## 2025-04-03 VITALS
DIASTOLIC BLOOD PRESSURE: 70 MMHG | WEIGHT: 165 LBS | BODY MASS INDEX: 27.49 KG/M2 | TEMPERATURE: 97.2 F | SYSTOLIC BLOOD PRESSURE: 105 MMHG | HEART RATE: 79 BPM | HEIGHT: 65 IN

## 2025-04-03 DIAGNOSIS — H61.23 HEARING LOSS DUE TO CERUMEN IMPACTION, BILATERAL: ICD-10-CM

## 2025-04-03 DIAGNOSIS — H90.71 MIXED CONDUCTIVE AND SENSORINEURAL HEARING LOSS, UNILATERAL, RIGHT EAR, WITH UNRESTRICTED HEARING ON THE CONTRALATERAL SIDE: ICD-10-CM

## 2025-04-03 DIAGNOSIS — H69.93 CHRONIC EUSTACHIAN TUBE DYSFUNCTION, BILATERAL: Primary | ICD-10-CM

## 2025-04-03 DIAGNOSIS — H90.A22 SENSORINEURAL HEARING LOSS (SNHL) OF LEFT EAR WITH RESTRICTED HEARING OF RIGHT EAR: ICD-10-CM

## 2025-04-03 DIAGNOSIS — H69.02 PATULOUS EUSTACHIAN TUBE, LEFT: ICD-10-CM

## 2025-04-03 DIAGNOSIS — H90.3 SENSORINEURAL HEARING LOSS, ASYMMETRICAL: ICD-10-CM

## 2025-04-03 DIAGNOSIS — H90.A21 SENSORINEURAL HEARING LOSS (SNHL) OF RIGHT EAR WITH RESTRICTED HEARING OF LEFT EAR: ICD-10-CM

## 2025-04-03 DIAGNOSIS — H61.21 HEARING LOSS OF RIGHT EAR DUE TO CERUMEN IMPACTION: ICD-10-CM

## 2025-04-03 DIAGNOSIS — H90.A32 MIXED CONDUCTIVE AND SENSORINEURAL HEARING LOSS OF LEFT EAR WITH RESTRICTED HEARING OF RIGHT EAR: ICD-10-CM

## 2025-04-03 RX ORDER — OFLOXACIN 3 MG/ML
5 SOLUTION/ DROPS OPHTHALMIC 2 TIMES DAILY
Qty: 5 ML | Refills: 2 | Status: SHIPPED | OUTPATIENT
Start: 2025-04-03 | End: 2025-04-13

## 2025-04-03 NOTE — PATIENT INSTRUCTIONS
1.  Patient has history of what sounded like a patulous eustachian tube with fullness in both ears.  She had bilateral PE tubes placed with no help and that is when the decision was made that she may have patulous eustachian tube.  Otherwise the PE tube had extruded on the left ear but the right side still remained.  Today cerumen impaction was cleaned from the right side but the right tube still remains in place.  2.  I will do some Floxin ophthalmic drops on the right ear to loosen up some of the cerumen scab that is around the tube.  3.  Since patient has been complaining about decreased hearing and understanding out of the left ear, I am going to obtain an audiogram.  4.  Audiogram shows SRT of 30 on the right and 55 on the left.  Tympanogram is type B on the right and type a on the left.  Discrimination scores 80% on the right and 73% on the left.  Pure-tone show right moderate rising to mild sensorineural hearing loss and left moderate sensorineural hearing loss.  5.  I would recommend patient get hearing aids evaluation.  Otherwise follow-up in 6 months.

## 2025-04-03 NOTE — PROGRESS NOTES
Patient Name: Suzanna Santos   Visit Date: 04/03/2025   Patient ID: [id number]  Provider: Ag Pepe MD    Sex: female  Location: [facility]   YOB: 1950  Location Address: [address] [place] [address], [address] [place] [address],?[place]?[id number]    Primary Care Provider Joshua Lee MD  Location Phone: [phone]    Referring Provider: No ref. provider found        Chief Complaint  6 month follow up PE tube check, chronic ETD, Hearing Loss, and patulous eustachian tube left side    History of Present Illness  Suzanna Santos is a 74 y.o. female who presents to [facility] for 6 month follow up PE tube check, chronic ETD, Hearing Loss, and patulous eustachian tube left side. Patient was last seen by Dr. Pepe on 7/17/2023 at [facility] with history of chronic otitis media and hearing loss.  She also has left ear patulous eustachian tube.  She underwent bilateral PE tube placements and nasopharyngoscopy on 8/30/2022.  Examination revealed a ventilation tubes in place and patent otherwise audiogram obtained on 6/15/2023 shows SRT of 25 on the right and 45 on the left.  Discrimination scores 93% on the right and 87% on the left.  Tympanograms are type B bilaterally consistent with patent ventilation tubes.  Pure-tone showed left moderate rising to mild mixed hearing loss in the right ear with mild conductive hearing loss.  Still complains of fullness in the left ear.  Despite the fact that she had tube extruded on the left ear, patient complains of difficulty hearing out of the left ear.  Past Medical History:   Diagnosis Date    Anemia     ASYMPTOMATIC    Arthritis     Asthma     inhaler prn    Benign essential hypertension     Cancer     skin    Coronary artery disease     COVID-19 long hauler manifesting chronic fatigue 01/16/2023    Diverticulosis     Eustachian tube dysfunction, bilateral 08/17/2022    GERD (gastroesophageal reflux  disease)     Glaucoma     History of degenerative disc disease     Hypertension     Neuropathy     Pacemaker     [facility] - [facility] DR RUANO    Paroxysmal atrial fibrillation 07/13/2019     ·  ablation in 2008  afib  · Repeat fib ablation '17 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 but the roof line required additrional ablation   · 3/19 and was found to have an atypical rigfht atrial flutter, an 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 and a left atrial tachycardia near the AV node   · She developed recurrent SVT in the 130-150 range and was taken back to the lab    PONV (postoperative nausea and vomiting)     Right wrist fracture     SVT (supraventricular tachycardia)     Thyroid disease     no med/cyst on thyroid       Past Surgical History:   Procedure Laterality Date    CARDIAC ABLATION      x4 - last 2019    CARDIAC CATHETERIZATION      CARDIAC ELECTROPHYSIOLOGY MAPPING AND ABLATION      CATARACT EXTRACTION, BILATERAL      CHOLECYSTECTOMY      COLONOSCOPY  02/08/2017    DR MARIE HISTORY OF COLON POLYPS    COLONOSCOPY  09/30/2021    dr marie    COLONOSCOPY N/A 09/30/2021    Procedure: COLONOSCOPY with BIOPSY/POLYPECTOMY COLD SNARE;  Surgeon: Tim Marie MD;  Location: [facility] ENDOSCOPY;  Service: Gastroenterology;  Laterality: N/A;  COLON POLYPS    COLONOSCOPY N/A 5/28/2024    Procedure: COLONOSCOPY;  Surgeon: Tim Marie MD;  Location: [facility] ENDOSCOPY;  Service: Gastroenterology;  Laterality: N/A;  HEMORRHOIDS    EAR TUBES Bilateral 08/30/2022    Procedure: BILATERAL MYRINGOTOMY WITH INSERTION OF EAR TUBES, NASOPHARYNGOSCOPY;  Surgeon: Ag Pepe MD;  Location: [facility] MAIN OR;  Service: ENT;  Laterality: Bilateral;    ENDOSCOPY  06/18/2018    DR MARIE    ENDOSCOPY N/A 12/13/2022    Procedure: ESOPHAGOGASTRODUODENOSCOPY WITH BIOPSIES;  Surgeon: Tim Marie MD;  Location: [facility] ENDOSCOPY;  Service: Gastroenterology;  Laterality: N/A;  HIATAL HERNIA    EYE  SURGERY      Macular tear repair 3/1/2021 x2    HYSTERECTOMY      INSERT / REPLACE / REMOVE PACEMAKER      JOINT REPLACEMENT Right     total hip replacement     JOINT REPLACEMENT Bilateral     TKR    ORIF WRIST FRACTURE Right 3/25/2025    Procedure: OPEN REDUCTION INTERNAL FIXATION RIGHT DISTAL RADIUS: Surgery to fix the broken right radius with plate and screws;  Surgeon: Johnathan Rangel MD;  Location: [facility];  Service: Orthopedics;  Laterality: Right;    PACEMAKER REPLACEMENT      SIGMOIDOSCOPY  12/13/2022    Procedure: SIGMOIDOSCOPY FLEXIBLE;  Surgeon: Tim Mixon MD;  Location: [facility] ENDOSCOPY;  Service: Gastroenterology;;  DIVERTICULOSIS    TONSILLECTOMY      VITRECTOMY PARS PLANA W/ REPAIR OF MACULAR HOLE           Current Outpatient Medications:     albuterol sulfate  (90 Base) MCG/ACT inhaler, 2 puffs Daily As Needed., Disp: , Rfl:     Eliquis 5 MG tablet tablet, TAKE ONE TABLET BY MOUTH EVERY 12 HOURS, Disp: 60 tablet, Rfl: 3    famotidine (PEPCID) 20 MG tablet, Take 1 tablet by mouth Every Night., Disp: , Rfl:     folic acid (FOLVITE) 1 MG tablet, Take 1 tablet by mouth Daily., Disp: , Rfl:     HYDROcodone-acetaminophen (NORCO) 7.5-325 MG per tablet, Take 1 tablet by mouth Every 4 (Four) Hours As Needed for Moderate Pain or Severe Pain., Disp: 30 tablet, Rfl: 0    ipratropium-albuterol (DUO-NEB) 0.5-2.5 mg/3 ml nebulizer, INHALE CONTENTS OF 1 VIAL USING NEBULIZER MACHINE EVERY 4 HOURS IF NEEDED FOR WHEEZING., Disp: 360 mL, Rfl: 1    methocarbamol (ROBAXIN) 500 MG tablet, TAKE ONE TABLET BY MOUTH THREE TIMES A DAY IF NEEDED FOR MUSCLE SPASMS., Disp: 60 tablet, Rfl: 3    metoprolol tartrate (LOPRESSOR) 25 MG tablet, TAKE 1 TABLET BY MOUTH 2 (TWO) TIMES A DAY., Disp: 180 tablet, Rfl: 3    montelukast (SINGULAIR) 10 MG tablet, TAKE 1 TABLET BY MOUTH EVERY NIGHT., Disp: 30 tablet, Rfl: 1    multivitamin with minerals tablet tablet, Take 1 tablet by mouth Daily., Disp: , Rfl:     naloxone  "(NARCAN) 4 MG/0.1ML nasal spray, Call 911. Don't prime. [place] in 1 nostril for overdose. Repeat in 2-3 minutes in other nostril if no or minimal breathing/responsiveness., Disp: 2 each, Rfl: 0    olmesartan-hydrochlorothiazide (BENICAR HCT) 40-12.5 MG per tablet, TAKE 1 TABLET BY MOUTH DAILY., Disp: 90 tablet, Rfl: 3    Omeprazole Magnesium (PRILOSEC OTC PO), Take 20 mg by mouth Daily., Disp: , Rfl:     ondansetron (Zofran) 4 MG tablet, Take 1 tablet by mouth Every 8 (Eight) Hours As Needed for Nausea or Vomiting., Disp: 30 tablet, Rfl: 0    PARoxetine (PAXIL) 20 MG tablet, TAKE 1 TABLET BY MOUTH EVERY MORNING., Disp: 90 tablet, Rfl: 3    pravastatin (PRAVACHOL) 10 MG tablet, TAKE 1 TABLET BY MOUTH EVERY NIGHT., Disp: 90 tablet, Rfl: 3    saccharomyces boulardii (FLORASTOR) 250 MG capsule, Take 1 capsule by mouth Daily., Disp: , Rfl:     traMADol (ULTRAM) 50 MG tablet, Take 1 tablet by mouth Every 6 (Six) Hours As Needed for Moderate Pain ., Disp: 90 tablet, Rfl: 2    Trelegy Ellipta 200-62.5-25 MCG/ACT inhaler, INHALE 1 PUFF BY MOUTH EVERY DAY, Disp: 60 each, Rfl: 5    ofloxacin (OCUFLOX) 0.3 %, Administer 5 drops to the right ear 2 (Two) Times a Day for 10 days., Disp: 5 mL, Rfl: 2     Allergies   Allergen Reactions    Doxycycline Hives     .    Lariam [Mefloquine] Hives     .    Lisinopril Hives     .    Talwin [Pentazocine] Hallucinations       Social History     Tobacco Use    Smoking status: Never     Passive exposure: Never    Smokeless tobacco: Never    Tobacco comments:     Grew up in smoking environm.   Vaping Use    Vaping status: Never Used   Substance Use Topics    Alcohol use: No    Drug use: Never        Objective     Vital Signs:   Vitals:    04/03/25 0859   BP: 105/70   Pulse: 79   Temp: 97.2 °F (36.2 °C)   Weight: 74.8 kg (165 lb)   Height: 165.1 cm (65\")       Tobacco Use: Low Risk  (4/3/2025)    Patient History     Smoking Tobacco Use: Never     Smokeless Tobacco Use: Never     Passive Exposure: " Never         Physical Exam    Constitutional   Appearance  well developed, well-nourished, alert and in no acute distress, voice clear and strong    Head   Inspection  no deformities or lesions      Face   Inspection  no facial lesions; House-Brackmann I/VI bilaterally   Palpation  no TMJ crepitus nor  muscle tenderness bilaterally     Eyes/Vision   Visual Fields  extraocular movements are intact, no spontaneous or gaze-induced nystagmus  Conjunctivae  clear   Sclerae  clear   Pupils and Irises  pupils equal, round, and reactive to light.   Nystagmus  not present     Ears, Nose, Mouth and Throat  Ears  External Ears  appearance within normal limits, no lesions present   Otoscopic Examination  Left TM and EACs clear  Right ventilation tube is still present and patent with wax impaction that is inferiorly was really hard and as if it was crusting that was removed under microscope.  Hearing  intact to conversational voice both ears   Tunning fork testing    Rinne:  Yen:    Nose  External Nose  appearance normal   Intranasal Exam  mucosa within normal limits, vestibules normal, no intranasal lesions present, septum midline, sinuses non tender to percussion   Modified Kristy Test:    Oral Cavity  Oral Mucosa  oral mucosa normal without pallor or cyanosis   Lips  lip appearance normal   Teeth  normal dentition for age   Gums  gums pink, non-swollen, no bleeding present   Tongue  tongue appearance normal; normal mobility   Palate  hard palate normal, soft palate appearance normal with symmetric mobility     Throat  Oropharynx  no inflammation or lesions present, tonsils within normal limits   Hypopharynx  appearance within normal limits   Larynx  voice normal     Neck  Inspection/Palpation  normal appearance, no masses or tenderness, trachea midline; thyroid size normal, nontender, no nodules or masses present on palpation     Lymphatic  Neck  no lymphadenopathy present   Supraclavicular Nodes  no  lymphadenopathy present   Preauricular Nodes  no lymphadenopathy present     Respiratory  Respiratory Effort  breathing unlabored   Inspection of Chest  normal appearance, no retractions     Musculoskeletal   Cervical back: Normal range of motion and neck supple.      Skin and Subcutaneous Tissue  General Inspection  Regarding face and neck - there are no rashes present, no lesions present, and no areas of discoloration     Neurologic  Cranial Nerves  cranial nerves II-XII are grossly intact bilaterally   Gait and Station  normal gait, able to stand without diffculty    Psychiatric  Judgement and Insight  judgment and insight intact   Mood and Affect  mood normal, affect appropriate       RESULTS REVIEWED    I have reviewed the following information:   []  Previous Internal Note  []  Previous External Note:   []  Ordered Tests & Results:      Pathology:   Lab Results   Component Value Date    Microcytes Slight/1+ 07/10/2023       TSH   Date Value Ref Range Status   12/11/2024 1.680 0.270 - 4.200 uIU/mL Final     Free T4   Date Value Ref Range Status   12/11/2024 1.74 (H) 0.92 - 1.68 ng/dL Final     Calcium   Date Value Ref Range Status   03/19/2025 9.4 8.6 - 10.5 mg/dL Final     25 Hydroxy, Vitamin D   Date Value Ref Range Status   09/25/2020 25.1 (L) 30.0 - 100.0 ng/mL Final     Comment:     Vitamin D Status          Range  ---------------------------------------  Deficiency                <10 ng/mL  Insufficiency             10-30 ng/mL  Sufficiency                ng/mL  Toxicity                  >100 ng/mL         XR Ribs 2 View Right  Result Date: 3/19/2025  Impression: No acute, displaced right-sided rib fracture. Electronically Signed: Oral John  3/19/2025 9:33 PM EDT  Workstation ID: [id number]    CT Head Without Contrast  Result Date: 3/19/2025  Impression: No acute intracranial pathology. Electronically Signed: Clint Fajardo MD  3/19/2025 7:30 PM EDT  Workstation ID: [id number]    XR Hip With or  Without Pelvis 2 - 3 View Right  Result Date: 3/19/2025  Impression: No acute osseous abnormality identified of the right hip. Electronically Signed: Tala Huerta  3/19/2025 7:23 PM EDT  Workstation ID: [id number]    XR Chest 1 View  Result Date: 3/19/2025  Impression: No radiographic evidence of acute traumatic injury to the chest. Electronically Signed: Oral John  3/19/2025 7:21 PM EDT  Workstation ID: [id number]    XR Wrist 3+ View Right  Result Date: 3/19/2025  Impression: Impacted, comminuted, intra-articular fracture of the distal radius. Electronically Signed: Tala Huerta  3/19/2025 7:20 PM EDT  Workstation ID: [id number]    XR Spine Lumbar 2 or 3 View  Result Date: 1/13/2025  Impression: L3 and L4 compression deformities appear similar as compared to prior MRI of 12/5/2024, but appear new as compared to prior study of 7/30/2024. Electronically Signed: Eagle Shelton MD  1/13/2025 11:29 AM EST  Workstation ID: [id number]    MRI Lumbar Spine Without Contrast  Result Date: 12/9/2024  Impression: 1.Mild compression fractures along the superior endplates of the L3 and L4 vertebral bodies with approximate 20% loss of height. No retropulsed fracture fragments. 2.Old minimal compression fracture superior endplate of the T12 vertebral body. 3.Multilevel degenerative disc disease and degenerative facet change throughout the lumbar spine. There is mild canal stenosis at the L2/3 level. There is multilevel neural foraminal narrowing as discussed in detail above. Electronically Signed: Logan Byers MD  12/9/2024 8:27 AM EST  Workstation ID: [id number]      I have discussed the interpretation of the above results with the patient.    Ear Cerumen Removal    Date/Time: 4/3/2025 9:35 AM    Performed by: Ag Pepe MD  Authorized by: Ag Pepe MD    Anesthesia:  Local Anesthetic: none  Location details: right ear  Patient tolerance: patient tolerated the procedure well with no immediate complications  Comments:  Under microscope, right ear cerumen impaction was removed.  Ventilation tube still in place and patent without drainage.  Otherwise left TM and EAC is clear.  Procedure type: instrumentation, alligator forceps   Sedation:  Patient sedated: no                  Assessment and Plan   Diagnoses and all orders for this visit:    1. Chronic Eustachian tube dysfunction, bilateral (Primary)  -     Comprehensive Hearing Test; Future    2. Patulous Eustachian tube, left    3. Hearing loss due to cerumen impaction, bilateral    4. Mixed conductive and sensorineural hearing loss of left ear with restricted hearing of right ear  -     Comprehensive Hearing Test; Future    5. Hearing loss of right ear due to cerumen impaction  -     Ear Cerumen Removal  -     ofloxacin (OCUFLOX) 0.3 %; Administer 5 drops to the right ear 2 (Two) Times a Day for 10 days.  Dispense: 5 mL; Refill: 2    6. Mixed conductive and sensorineural hearing loss, unilateral, right ear, with unrestricted hearing on the contralateral side  -     Comprehensive Hearing Test; Future        (H69.93) Chronic Eustachian tube dysfunction, bilateral - Plan: Comprehensive Hearing Test    (H69.02) Patulous Eustachian tube, left    (H61.23) Hearing loss due to cerumen impaction, bilateral    (H90.A32) Mixed conductive and sensorineural hearing loss of left ear with restricted hearing of right ear - Plan: Comprehensive Hearing Test    (H61.21) Hearing loss of right ear due to cerumen impaction - Plan: Ear Cerumen Removal, ofloxacin (OCUFLOX) 0.3 %    (H90.71) Mixed conductive and sensorineural hearing loss, unilateral, right ear, with unrestricted hearing on the contralateral side - Plan: Comprehensive Hearing Test     Suzanna CHECO Santos  reports that she has never smoked. She has never been exposed to tobacco smoke. She has never used smokeless tobacco.         Plan:  Patient Instructions   1.  Patient has history of what sounded like a patulous eustachian tube with  fullness in both ears.  She had bilateral PE tubes placed with no help and that is when the decision was made that she may have patulous eustachian tube.  Otherwise the PE tube had extruded on the left ear but the right side still remained.  Today cerumen impaction was cleaned from the right side but the right tube still remains in place.  2.  I will do some Floxin ophthalmic drops on the right ear to loosen up some of the cerumen scab that is around the tube.  3.  Since patient has been complaining about decreased hearing and understanding out of the left ear, I am going to obtain an audiogram.  4.  Audiogram shows SRT of 30 on the right and 55 on the left.  Tympanogram is type B on the right and type a on the left.  Discrimination scores 80% on the right and 73% on the left.  Pure-tone show right moderate rising to mild sensorineural hearing loss and left moderate sensorineural hearing loss.  5.  I would recommend patient get hearing aids evaluation.  Otherwise follow-up in 6 months.        Follow Up   Return in about 6 months (around 10/3/2025), or Follow-up 6 months for PE tube check.  Patient was given instructions and counseling regarding her condition or for health maintenance advice. Please see specific information pulled into the AVS if appropriate.

## 2025-04-03 NOTE — PROGRESS NOTES
AUDIOMETRIC EVALUATION      Name:  Suzanna Santos  :  1950  Age:  74 y.o.  Date of Evaluation:  2025       History:  Mrs. Santos is seen today for a hearing evaluation due to history of tubes.    Audiologic Information:  Concerns for Hearing: Yes  PETs: Left ear only  Other otologic surgical history: Yes  Aural Pressure/Fullness: No  Otalgia: None  Otorrhea: No  Tinnitus: No  Dizziness: None  Noise Exposure: No  Family history of hearing loss: None  Head trauma requiring hospital stay: No  Chemotherapy: No  Other significant history: No    EVALUATION:    See audiogram    RESULTS:    Otoscopic Evaluation:        NOTE: Testing completed after ears were examined by Dr. Pepe.    Tympanometry (226 Hz):  Right: Type B, Normal ECV  Left: Type A    IMPRESSIONS:  Pure tone thresholds for the right ear shows a moderate rising to mild sensorineural hearing loss.  Pure tone thresholds for the left ear shows a moderate sensorineural loss.  Patient was counseled with regard to the findings.    Amplification needs:  Patient could benefit from amplification if they feel increased communication difficulties.    RECOMMENDATIONS/PLAN:  Follow-up recommendations of Dr. Pepe  Discussed results and recommendations with patient. Questions were addressed and the patient was encouraged to contact our department should concerns arise.          Jake Justice M.S, [facility]  Licensed Audiologist

## 2025-04-04 LAB
QT INTERVAL: 436 MS
QTC INTERVAL: 476 MS

## 2025-04-07 ENCOUNTER — OFFICE VISIT (OUTPATIENT)
Dept: ORTHOPEDIC SURGERY | Facility: CLINIC | Age: 75
End: 2025-04-07
Payer: MEDICARE

## 2025-04-07 VITALS
WEIGHT: 165 LBS | DIASTOLIC BLOOD PRESSURE: 79 MMHG | BODY MASS INDEX: 27.49 KG/M2 | OXYGEN SATURATION: 96 % | HEART RATE: 62 BPM | SYSTOLIC BLOOD PRESSURE: 133 MMHG | HEIGHT: 65 IN

## 2025-04-07 DIAGNOSIS — S62.101A CLOSED FRACTURE OF RIGHT WRIST, INITIAL ENCOUNTER: Primary | ICD-10-CM

## 2025-04-07 DIAGNOSIS — S79.911A INJURY OF RIGHT HIP, INITIAL ENCOUNTER: ICD-10-CM

## 2025-04-07 PROCEDURE — 1159F MED LIST DOCD IN RCRD: CPT | Performed by: STUDENT IN AN ORGANIZED HEALTH CARE EDUCATION/TRAINING PROGRAM

## 2025-04-07 PROCEDURE — 3075F SYST BP GE 130 - 139MM HG: CPT | Performed by: STUDENT IN AN ORGANIZED HEALTH CARE EDUCATION/TRAINING PROGRAM

## 2025-04-07 PROCEDURE — 1160F RVW MEDS BY RX/DR IN RCRD: CPT | Performed by: STUDENT IN AN ORGANIZED HEALTH CARE EDUCATION/TRAINING PROGRAM

## 2025-04-07 PROCEDURE — 99024 POSTOP FOLLOW-UP VISIT: CPT | Performed by: STUDENT IN AN ORGANIZED HEALTH CARE EDUCATION/TRAINING PROGRAM

## 2025-04-07 PROCEDURE — 3078F DIAST BP <80 MM HG: CPT | Performed by: STUDENT IN AN ORGANIZED HEALTH CARE EDUCATION/TRAINING PROGRAM

## 2025-04-07 RX ORDER — HYDROCODONE BITARTRATE AND ACETAMINOPHEN 5; 325 MG/1; MG/1
1 TABLET ORAL EVERY 6 HOURS PRN
Qty: 20 TABLET | Refills: 0 | Status: SHIPPED | OUTPATIENT
Start: 2025-04-07

## 2025-04-07 RX ORDER — METHOCARBAMOL 500 MG/1
TABLET, FILM COATED ORAL
Qty: 60 TABLET | Refills: 4 | Status: SHIPPED | OUTPATIENT
Start: 2025-04-07

## 2025-04-07 NOTE — PROGRESS NOTES
"Chief Complaint  Follow-up and Pain of the Right Wrist and Suture / Staple Removal, right hip pain    Subjective          Suzanna Santos presents to [facility] for   History of Present Illness    Pat returns for follow-up of her right wrist.  She is status post open reduction internal excision of a right distal radius fracture on 3/25.  She reports she is doing well overall.  Her pain is improving.  She still has pain in the groin.  She has pain with active hip motion.    Allergies   Allergen Reactions    Doxycycline Hives     .    Lariam [Mefloquine] Hives     .    Lisinopril Hives     .    Talwin [Pentazocine] Hallucinations        Social History     Socioeconomic History    Marital status:    Tobacco Use    Smoking status: Never     Passive exposure: Never    Smokeless tobacco: Never    Tobacco comments:     Grew up in smoking environm.   Vaping Use    Vaping status: Never Used   Substance and Sexual Activity    Alcohol use: No    Drug use: Never    Sexual activity: Defer     Partners: Male     Comment: age        I reviewed the patient's chief complaint, history of present illness, review of systems, past medical history, surgical history, family history, social history, medications, and allergy list.     REVIEW OF SYSTEMS    Constitutional: Denies fevers, chills, weight loss  Cardiovascular: Denies chest pain, shortness of breath  Skin: Denies rashes, acute skin changes  Neurologic: Denies headache, loss of consciousness  MSK: Right wrist and hip pain      Objective   Vital Signs:   /79   Pulse 62   Ht 165.1 cm (65\")   Wt 74.8 kg (165 lb)   SpO2 96%   BMI 27.46 kg/m²     Body mass index is 27.46 kg/m².    Physical Exam    General: Alert. No acute distress.   Right upper extremity: Sutures removed.  Incision is well-appearing.  No redness or drainage.  Swelling is mild.  30 degrees of wrist flexion and extension.  Arthritic deformity in the fingers unchanged " overall.  Palpable radial pulse sensation intact in the fingertips.  Right lower extremity: No pain with passive hip motion.  Pain with active hip flexion.  Pain localizes deep in the groin.  Nonantalgic gait.    Procedures    Imaging Results (Most Recent)       Procedure Component Value Units Date/Time    XR Wrist 2 View Right [[id number]] Resulted: 04/07/25 1123     Updated: 04/07/25 1123    Narrative:      Indications: Follow-up right distal radius fracture    Views: AP and lateral right wrist    Findings: Stable appearing distal radius fracture status post open   reduction internal fixation.  No implant complications noted.    Comparative Data: Comparative data found and reviewed today                     Assessment and Plan        XR Wrist 2 View Right  Result Date: 4/7/2025  Narrative: Indications: Follow-up right distal radius fracture Views: AP and lateral right wrist Findings: Stable appearing distal radius fracture status post open reduction internal fixation.  No implant complications noted. Comparative Data: Comparative data found and reviewed today    FL Surgery Fluoro  Result Date: 3/25/2025  Narrative: This procedure was auto-finalized with no dictation required.    Peripheral Block  Result Date: 3/25/2025  Narrative: Roshan Coelho MD     3/25/2025  2:04 PM Peripheral Block Patient reassessed immediately prior to procedure Patient location during procedure: pre-op Start time: 3/25/2025 1:26 PM Stop time: 3/25/2025 1:41 PM Reason for block: at surgeon's request and post-op pain management Performed by Anesthesiologist: Roshan Coelho MD Preanesthetic Checklist Completed: patient identified, IV checked, site marked, risks and benefits discussed, surgical consent, monitors and equipment checked, pre-op evaluation and timeout performed Prep: Pt Position: supine Sterile barriers:alcohol skin prep, partial drape, cap, washed/disinfected hands, gloves and mask Prep: ChloraPrep Patient monitoring: blood  pressure monitoring, continuous pulse oximetry and EKG Procedure Sedation: yes Performed under: local infiltration Guidance:ultrasound guided and nerve stimulator ULTRASOUND INTERPRETATION.  Using ultrasound guidance a 22 G gauge needle was placed in close proximity to the brachial plexus nerve, at which point, under ultrasound guidance anesthetic was injected in the area of the nerve and spread of the anesthesia was seen on ultrasound in close proximity thereto.  There were no abnormalities seen on ultrasound; a digital image was taken; and the patient tolerated the procedure with no complications. Images:still images obtained, printed/placed on chart Laterality:right Block Type:axillary Injection Technique:single-shot Needle Type:echogenic Needle Gauge:22 G (2in) Resistance on Injection: none Medications Used: dexamethasone (DECADRON) injection 4 mg/mL - Injection, Axilla Right  8 mg - 3/25/2025 1:41:00 PM bupivacaine-EPINEPHrine PF (MARCAINE w/EPI) 0.5% -1:287893 injection - Injection, Axilla Right  32 mL - 3/25/2025 1:41:00 PM Medications Comment:8cc of solution injected into musculotaneous nerve bundle Post Assessment Injection Assessment: negative aspiration for heme, no paresthesia on injection and incremental injection Patient Tolerance:comfortable throughout block Complications:no Additional Notes The block or continuous infusion is requested by the referring physician for management of postoperative pain, or pain related to a procedure. Ultrasound guidance (deemed medically necessary). Painless injection, pt was awake and conversant during the procedure without complications. Needle and surrounding structures visualized throughout procedure. No adverse reactions or complications seen during this period. Post-procedure image showed no signs of complication, and anatomy was consistent with an uncomplicated nerve blockade. Performed by: Roshan Coelho MD     XR Ribs 2 View Right  Result Date:  3/19/2025  Narrative: XR RIBS 2 VW RIGHT Date of Exam: 3/19/2025 9:04 PM EDT Indication: Rib pain after fall Comparison: Chest radiograph 3/19/2025 Findings: Partially imaged pacemaker. Mediastinum: Partially imaged mediastinum appears otherwise unchanged Lungs: Imaged lungs appear clear without focal consolidation appreciated. Pleura: No visible right pleural effusion or pneumothorax. Bones and soft tissues: No acute, displaced fracture seen.     Impression: Impression: No acute, displaced right-sided rib fracture. Electronically Signed: Oral John  3/19/2025 9:33 PM EDT  Workstation ID: [id number]    CT Head Without Contrast  Result Date: 3/19/2025  Narrative: CT HEAD WO CONTRAST Date of Exam: 3/19/2025 7:14 PM EDT Indication: fall, head injury. Comparison: 6/15/2021 Technique: Axial CT images were obtained of the head without contrast administration.  Reconstructed coronal and sagittal images were also obtained. Automated exposure control and iterative construction methods were used. Findings: No intracranial hemorrhage. Gray-white matter differentiation is maintained without evidence of an acute infarction. Multiple foci of decreased attenuation are present within the subcortical, deep cerebral, and periventricular white matter consistent with chronic small vessel/microangiopathic ischemic changes. No extra-axial mass or collection. The ventricles and sulci are prominent commensurate with involutional changes. The posterior fossa appears grossly normal. Sellar and suprasellar structures are normal. Lens replacements. Small air-fluid level within the left maxillary sinus. Partial opacification of the right maxillary sinus with fluid. The bony calvarium is intact.     Impression: Impression: No acute intracranial pathology. Electronically Signed: Clint Fajardo MD  3/19/2025 7:30 PM EDT  Workstation ID: [id number]    XR Hip With or Without Pelvis 2 - 3 View Right  Result Date: 3/19/2025  Narrative: XR HIP W OR WO  PELVIS 2-3 VIEW RIGHT Date of Exam: 3/19/2025 6:52 PM EDT Indication: fall Comparison: CT abdomen and pelvis 1/4/2017. Findings: There are postoperative changes from right hip arthroplasty. No acute fracture or dislocation is identified. Degenerative changes are included in the lower lumbar spine.     Impression: Impression: No acute osseous abnormality identified of the right hip. Electronically Signed: Tala Huerta  3/19/2025 7:23 PM EDT  Workstation ID: [id number]    XR Chest 1 View  Result Date: 3/19/2025  Narrative: XR CHEST 1 VW Date of Exam: 3/19/2025 7:00 PM EDT Indication: fall Comparison: None available. Findings: Left chest wall pacemaker in stable position Mediastinum: Cardiac silhouette appears unchanged and enlarged Lungs: The lungs appear clear without focal consolidation appreciated. Pleura: No pleural effusion or pneumothorax. Bones and soft tissues: No acute, displaced fracture seen.     Impression: Impression: No radiographic evidence of acute traumatic injury to the chest. Electronically Signed: Oral John  3/19/2025 7:21 PM EDT  Workstation ID: [id number]    XR Wrist 3+ View Right  Result Date: 3/19/2025  Narrative: XR WRIST 3+ VW RIGHT Date of Exam: 3/19/2025 6:49 PM EDT Indication: fall Comparison: Right wrist x-rays 9/8/2020 Findings: Diffuse osteopenia appears increased compared to 2020 x-rays. There is an impacted, comminuted, intra-articular fracture of the distal radial metaphysis and epiphysis. No additional fractures are seen. There are multifocal arthritic changes of carpal metacarpal joints, metacarpal phalangeal joints, and included thumb interphalangeal joint.     Impression: Impression: Impacted, comminuted, intra-articular fracture of the distal radius. Electronically Signed: Tala Huerta  3/19/2025 7:20 PM EDT  Workstation ID: [id number]       Diagnoses and all orders for this visit:    1. Closed fracture of right wrist, initial encounter (Primary)  -     XR Wrist 2 View  Right  -     HYDROcodone-acetaminophen (NORCO) 5-325 MG per tablet; Take 1 tablet by mouth Every 6 (Six) Hours As Needed for Severe Pain.  Dispense: 20 tablet; Refill: 0    2. Injury of right hip, initial encounter  -     CT Pelvis Without Contrast; Future        We reviewed x-rays today.  Her fracture is well aligned and there are no hardware complications noted.  We discussed home range of motion exercises for the right wrist.  5 pound lifting restriction.  We discussed incision site care.  A brace was provided today.    We discussed her persistent right hip pain status post fall.  Initial x-rays were negative.  We will obtain a CT scan for better evaluation.  Follow-up after CT to discuss results and any treatment options.    Pain medicine refilled today.    Call or return if worsening symptoms.    Scribed for Johnathan Rangel MD by Johnathan Rangel MD  04/07/2025   10:53 EDT         Follow Up       CT result    Patient was given instructions and counseling regarding her condition or for health maintenance advice. Please see specific information pulled into the AVS if appropriate.     I have personally performed the services described in this document as scribed by the above individual and it is both accurate and complete. Johnathan Rangel MD 04/07/25 11:27 EDT

## 2025-04-08 DIAGNOSIS — I48.91 ATRIAL FIBRILLATION, UNSPECIFIED TYPE: ICD-10-CM

## 2025-04-08 RX ORDER — METOPROLOL TARTRATE 25 MG/1
25 TABLET, FILM COATED ORAL 2 TIMES DAILY
Qty: 180 TABLET | Refills: 4 | Status: SHIPPED | OUTPATIENT
Start: 2025-04-08

## 2025-04-16 ENCOUNTER — HOSPITAL ENCOUNTER (OUTPATIENT)
Dept: CT IMAGING | Facility: HOSPITAL | Age: 75
Discharge: HOME | End: 2025-04-16
Admitting: STUDENT IN AN ORGANIZED HEALTH CARE EDUCATION/TRAINING PROGRAM
Payer: MEDICARE

## 2025-04-16 DIAGNOSIS — S79.911A INJURY OF RIGHT HIP, INITIAL ENCOUNTER: ICD-10-CM

## 2025-04-16 PROCEDURE — 72192 CT PELVIS W/O DYE: CPT

## 2025-04-21 ENCOUNTER — OFFICE VISIT (OUTPATIENT)
Dept: ORTHOPEDIC SURGERY | Facility: CLINIC | Age: 75
End: 2025-04-21
Payer: MEDICARE

## 2025-04-21 VITALS
SYSTOLIC BLOOD PRESSURE: 149 MMHG | HEART RATE: 68 BPM | OXYGEN SATURATION: 93 % | WEIGHT: 165 LBS | DIASTOLIC BLOOD PRESSURE: 86 MMHG | HEIGHT: 65 IN | BODY MASS INDEX: 27.49 KG/M2

## 2025-04-21 DIAGNOSIS — S79.911A INJURY OF RIGHT HIP, INITIAL ENCOUNTER: ICD-10-CM

## 2025-04-21 DIAGNOSIS — S62.101A CLOSED FRACTURE OF RIGHT WRIST, INITIAL ENCOUNTER: Primary | ICD-10-CM

## 2025-04-21 PROCEDURE — 3079F DIAST BP 80-89 MM HG: CPT | Performed by: STUDENT IN AN ORGANIZED HEALTH CARE EDUCATION/TRAINING PROGRAM

## 2025-04-21 PROCEDURE — 99213 OFFICE O/P EST LOW 20 MIN: CPT | Performed by: STUDENT IN AN ORGANIZED HEALTH CARE EDUCATION/TRAINING PROGRAM

## 2025-04-21 PROCEDURE — 1160F RVW MEDS BY RX/DR IN RCRD: CPT | Performed by: STUDENT IN AN ORGANIZED HEALTH CARE EDUCATION/TRAINING PROGRAM

## 2025-04-21 PROCEDURE — 3077F SYST BP >= 140 MM HG: CPT | Performed by: STUDENT IN AN ORGANIZED HEALTH CARE EDUCATION/TRAINING PROGRAM

## 2025-04-21 PROCEDURE — 1159F MED LIST DOCD IN RCRD: CPT | Performed by: STUDENT IN AN ORGANIZED HEALTH CARE EDUCATION/TRAINING PROGRAM

## 2025-04-21 NOTE — PROGRESS NOTES
"Chief Complaint  Pain and Follow-up of the Right Wrist and Pain and Follow-up of the Right Hip    Subjective          Suzanna Santos presents to [facility] for a follow up for her right wrist and right hip.     History of Present Illness    The patient presents here today for a follow up for her right wrist a right hip. She underwent a right wrist ORIF performed on 03/25/25. She presents today in a wrist brace. She was last seen in the office on 04/07/25 where we ordered a CT scan on her right hip. She presents to the office today for these results. She is still having pain to her groin and pain with active hip range of motion. She is still having a burning sensation to her wrist.     Allergies   Allergen Reactions    Doxycycline Hives     .    Lariam [Mefloquine] Hives     .    Lisinopril Hives     .    Talwin [Pentazocine] Hallucinations        Social History     Socioeconomic History    Marital status:    Tobacco Use    Smoking status: Never     Passive exposure: Never    Smokeless tobacco: Never    Tobacco comments:     Grew up in smoking environm.   Vaping Use    Vaping status: Never Used   Substance and Sexual Activity    Alcohol use: No    Drug use: Never    Sexual activity: Defer     Partners: Male     Comment: age        I reviewed the patient's chief complaint, history of present illness, review of systems, past medical history, surgical history, family history, social history, medications, and allergy list.     REVIEW OF SYSTEMS    Constitutional: Denies fevers, chills, weight loss  Cardiovascular: Denies chest pain, shortness of breath  Skin: Denies rashes, acute skin changes  Neurologic: Denies headache, loss of consciousness  MSK: right wrist and right hip pain       Objective   Vital Signs:   /86   Pulse 68   Ht 165.1 cm (65\")   Wt 74.8 kg (165 lb)   SpO2 93%   BMI 27.46 kg/m²     Body mass index is 27.46 kg/m².    Physical Exam    General: Alert. No " acute distress.   Right upper extremity: well healed surgicial incision, no redness or drainage. Swelling is mild. 30 degrees of wrist flexion and extension. Arthritic deformity in the fingers unchanged overall. Palpable radial pulse sensation intact in the fingertips.     Right lower extremity: No pain with passive hip motion.  Pain with active hip flexion.  Pain localizes deep in the groin.  Nonantalgic gait.       Procedures    Imaging Results (Most Recent)       None                     Assessment and Plan        CT Pelvis Without Contrast  Result Date: 4/21/2025  Narrative: CT PELVIS WO CONTRAST Date of Exam: 4/16/2025 11:47 AM EDT Indication: Fall, right hip pain. Total hip arthroplasty. Comparison: Pelvis and hip radiograph 3/19/2025 Technique: Axial CT images were obtained of the pelvis without contrast administration.  Reconstructed coronal and sagittal images were also obtained. Automated exposure control and iterative construction methods were used. Findings: Osteopenia. Right total hip arthroplasty. The imaging extends just to the tip of the femoral component and no further distal. In the field-of-view I don't see a periprosthetic fracture. No loosening or hardware failure is seen. There are fractures of the  right superior and inferior pubic rami which are nondisplaced and appears subacute. Nondisplaced subacute fracture of the inferior left pubic ramus. There is fracture of the sacrum which is nondisplaced involving the S1 vertebral body right aspect. It also appears subacute. The sacroiliac joints are well aligned. There is moderate degenerative disc disease lumbar spine most pronounced L4-L5 with endplate sclerosis at the L4-L5 level. Soft tissues in field-of-view show findings of hysterectomy pelvic floor dysfunction. Small right hip joint effusion is suspected. No soft tissue hematomas are seen in field-of-view.     Impression: Impression: 1.There are subacute appearing fractures of the right  superior and inferior pubic rami, left inferior pubic ramus, and right aspect of the S1 vertebral body. 2.Osteopenia. 3.Right total hip arthroplasty without periprosthetic fracture or hardware failure. 4.Small right hip joint effusion. 5.Moderate degenerative disc disease L4-L5. Electronically Signed: Rosmery Burns MD  4/21/2025 8:07 AM EDT  Workstation ID: [id number]    XR Wrist 2 View Right  Result Date: 4/7/2025  Narrative: Indications: Follow-up right distal radius fracture Views: AP and lateral right wrist Findings: Stable appearing distal radius fracture status post open reduction internal fixation.  No implant complications noted. Comparative Data: Comparative data found and reviewed today    FL Surgery Fluoro  Result Date: 3/25/2025  Narrative: This procedure was auto-finalized with no dictation required.    Peripheral Block  Result Date: 3/25/2025  Narrative: Roshan Coelho MD     3/25/2025  2:04 PM Peripheral Block Patient reassessed immediately prior to procedure Patient location during procedure: pre-op Start time: 3/25/2025 1:26 PM Stop time: 3/25/2025 1:41 PM Reason for block: at surgeon's request and post-op pain management Performed by Anesthesiologist: Roshan Coelho MD Preanesthetic Checklist Completed: patient identified, IV checked, site marked, risks and benefits discussed, surgical consent, monitors and equipment checked, pre-op evaluation and timeout performed Prep: Pt Position: supine Sterile barriers:alcohol skin prep, partial drape, cap, washed/disinfected hands, gloves and mask Prep: ChloraPrep Patient monitoring: blood pressure monitoring, continuous pulse oximetry and EKG Procedure Sedation: yes Performed under: local infiltration Guidance:ultrasound guided and nerve stimulator ULTRASOUND INTERPRETATION.  Using ultrasound guidance a 22 G gauge needle was placed in close proximity to the brachial plexus nerve, at which point, under ultrasound guidance anesthetic was injected in the area of the  nerve and spread of the anesthesia was seen on ultrasound in close proximity thereto.  There were no abnormalities seen on ultrasound; a digital image was taken; and the patient tolerated the procedure with no complications. Images:still images obtained, printed/placed on chart Laterality:right Block Type:axillary Injection Technique:single-shot Needle Type:echogenic Needle Gauge:22 G (2in) Resistance on Injection: none Medications Used: dexamethasone (DECADRON) injection 4 mg/mL - Injection, Axilla Right  8 mg - 3/25/2025 1:41:00 PM bupivacaine-EPINEPHrine PF (MARCAINE w/EPI) 0.5% -1:770806 injection - Injection, Axilla Right  32 mL - 3/25/2025 1:41:00 PM Medications Comment:8cc of solution injected into musculotaneous nerve bundle Post Assessment Injection Assessment: negative aspiration for heme, no paresthesia on injection and incremental injection Patient Tolerance:comfortable throughout block Complications:no Additional Notes The block or continuous infusion is requested by the referring physician for management of postoperative pain, or pain related to a procedure. Ultrasound guidance (deemed medically necessary). Painless injection, pt was awake and conversant during the procedure without complications. Needle and surrounding structures visualized throughout procedure. No adverse reactions or complications seen during this period. Post-procedure image showed no signs of complication, and anatomy was consistent with an uncomplicated nerve blockade. Performed by: Roshan Coelho MD        Diagnoses and all orders for this visit:    1. Closed fracture of right wrist, initial encounter (Primary)    2. Injury of right hip, initial encounter        The patient presents here today for a follow up right wrist and right hip. CT results was discussed and reviewed with the patient today in the office.     She will continue the wrist brace and gentle range of motion. Advised to avoid any heavy lifting or pulling with her  right wrist.     Home exercises given today for her right hip and will continue current medications for pain control.       Will obtain X-Rays of right wrist at next visit.     Call or return if worsening symptoms.    Scribed for Johnathan Rangel MD by Latesha Lentz  04/21/2025   10:42 EDT         Follow Up     2 weeks     Patient was given instructions and counseling regarding her condition or for health maintenance advice. Please see specific information pulled into the AVS if appropriate.     I have personally performed the services described in this document as scribed by the above individual and it is both accurate and complete. Johnathan Rangel MD 04/21/25 11:09 EDT

## 2025-05-05 DIAGNOSIS — R05.3 CHRONIC COUGH: ICD-10-CM

## 2025-05-05 DIAGNOSIS — J45.901 MODERATE ASTHMA WITH ACUTE EXACERBATION, UNSPECIFIED WHETHER PERSISTENT: ICD-10-CM

## 2025-05-05 RX ORDER — MONTELUKAST SODIUM 10 MG/1
10 TABLET ORAL NIGHTLY
Qty: 30 TABLET | Refills: 2 | Status: SHIPPED | OUTPATIENT
Start: 2025-05-05

## 2025-05-07 ENCOUNTER — OFFICE VISIT (OUTPATIENT)
Dept: ORTHOPEDIC SURGERY | Facility: CLINIC | Age: 75
End: 2025-05-07
Payer: MEDICARE

## 2025-05-07 VITALS — WEIGHT: 165 LBS | HEIGHT: 65 IN | BODY MASS INDEX: 27.49 KG/M2

## 2025-05-07 DIAGNOSIS — S62.101A CLOSED FRACTURE OF RIGHT WRIST, INITIAL ENCOUNTER: ICD-10-CM

## 2025-05-07 DIAGNOSIS — Z48.89 AFTERCARE FOLLOWING SURGERY: ICD-10-CM

## 2025-05-07 DIAGNOSIS — M25.531 RIGHT WRIST PAIN: Primary | ICD-10-CM

## 2025-05-07 PROCEDURE — 99024 POSTOP FOLLOW-UP VISIT: CPT | Performed by: STUDENT IN AN ORGANIZED HEALTH CARE EDUCATION/TRAINING PROGRAM

## 2025-05-07 NOTE — PROGRESS NOTES
"Chief Complaint  Follow-up of the Right Wrist    Subjective          Suzanna Santos presents to [facility] for a follow up for her right wrist.     History of Present Illness    The patient presents here today for a follow up for her right wrist. She underwent a right wrist ORIF performed on 03/25/25. She presents to the office today for her six week post-operative appointment. She presents today without a brace. She is overall doing well regarding her right wrist. She is ambulating today with a cane. She denies any new injury or falls since her last visit.     Allergies   Allergen Reactions    Doxycycline Hives     .    Lariam [Mefloquine] Hives     .    Lisinopril Hives     .    Talwin [Pentazocine] Hallucinations        Social History     Socioeconomic History    Marital status:    Tobacco Use    Smoking status: Never     Passive exposure: Never    Smokeless tobacco: Never    Tobacco comments:     Grew up in smoking environm.   Vaping Use    Vaping status: Never Used   Substance and Sexual Activity    Alcohol use: No    Drug use: Never    Sexual activity: Defer     Partners: Male     Comment: age        I reviewed the patient's chief complaint, history of present illness, review of systems, past medical history, surgical history, family history, social history, medications, and allergy list.     REVIEW OF SYSTEMS    Constitutional: Denies fevers, chills, weight loss  Cardiovascular: Denies chest pain, shortness of breath  Skin: Denies rashes, acute skin changes  Neurologic: Denies headache, loss of consciousness  MSK: right wrist pain       Objective   Vital Signs:   Ht 165.1 cm (65\")   Wt 74.8 kg (165 lb)   BMI 27.46 kg/m²     Body mass index is 27.46 kg/m².    Physical Exam    General: Alert. No acute distress.   Right upper extremity: well healed surgicial incision, wrist extension and flexion  to 45 degrees, full finger range of motion with baseline arthritic " deformity, non tender, mild swelling, neurovascularly intact, positive  pulse, sensation intact to the medial, radial and ulnar nerve     Procedures    Imaging Results (Most Recent)       Procedure Component Value Units Date/Time    XR Wrist 2 View Right [[id number]] Resulted: 05/07/25 1110     Updated: 05/07/25 1110    Narrative:      Indications: Follow-up right distal radius.    Views: AP and lateral right wrist    Findings: Comminuted distal radius fracture again seen.  Hardware in place   without evidence of complication.  Degenerative changes noted.    Comparative Data: Comparative data found and reviewed today                     Assessment and Plan        XR Wrist 2 View Right  Result Date: 5/7/2025  Narrative: Indications: Follow-up right distal radius. Views: AP and lateral right wrist Findings: Comminuted distal radius fracture again seen.  Hardware in place without evidence of complication.  Degenerative changes noted. Comparative Data: Comparative data found and reviewed today    CT Pelvis Without Contrast  Result Date: 4/21/2025  Narrative: CT PELVIS WO CONTRAST Date of Exam: 4/16/2025 11:47 AM EDT Indication: Fall, right hip pain. Total hip arthroplasty. Comparison: Pelvis and hip radiograph 3/19/2025 Technique: Axial CT images were obtained of the pelvis without contrast administration.  Reconstructed coronal and sagittal images were also obtained. Automated exposure control and iterative construction methods were used. Findings: Osteopenia. Right total hip arthroplasty. The imaging extends just to the tip of the femoral component and no further distal. In the field-of-view I don't see a periprosthetic fracture. No loosening or hardware failure is seen. There are fractures of the  right superior and inferior pubic rami which are nondisplaced and appears subacute. Nondisplaced subacute fracture of the inferior left pubic ramus. There is fracture of the sacrum which is nondisplaced involving the S1  vertebral body right aspect. It also appears subacute. The sacroiliac joints are well aligned. There is moderate degenerative disc disease lumbar spine most pronounced L4-L5 with endplate sclerosis at the L4-L5 level. Soft tissues in field-of-view show findings of hysterectomy pelvic floor dysfunction. Small right hip joint effusion is suspected. No soft tissue hematomas are seen in field-of-view.     Impression: Impression: 1.There are subacute appearing fractures of the right superior and inferior pubic rami, left inferior pubic ramus, and right aspect of the S1 vertebral body. 2.Osteopenia. 3.Right total hip arthroplasty without periprosthetic fracture or hardware failure. 4.Small right hip joint effusion. 5.Moderate degenerative disc disease L4-L5. Electronically Signed: Rosmery Burns MD  4/21/2025 8:07 AM EDT  Workstation ID: [id number]       Diagnoses and all orders for this visit:    1. Right wrist pain (Primary)  -     XR Wrist 2 View Right    2. Closed fracture of right wrist, initial encounter    3. Aftercare following surgery right wrist ORIF performed on 03/25/25      The patient presents here today for a follow up for her right wrist. X-rays were obtained in the office today and these were reviewed today.     She will continue light activities and will avoid any heavy lifting, pulling and pushing.      She will continue over the counter medications for pain control.     Will obtain X-Rays of right wrist at next visit.     Call or return if worsening symptoms.    Scribed for Johnathan Rangel MD by Latesha Lentz  05/07/2025   11:03 EDT         Follow Up     4 weeks     Patient was given instructions and counseling regarding her condition or for health maintenance advice. Please see specific information pulled into the AVS if appropriate.     I have personally performed the services described in this document as scribed by the above individual and it is both accurate and complete. Johnathan Rangel MD 05/07/25 11:24  EDT

## 2025-06-06 ENCOUNTER — OFFICE VISIT (OUTPATIENT)
Dept: ORTHOPEDIC SURGERY | Facility: CLINIC | Age: 75
End: 2025-06-06
Payer: MEDICARE

## 2025-06-06 VITALS — WEIGHT: 165 LBS | BODY MASS INDEX: 27.49 KG/M2 | HEIGHT: 65 IN

## 2025-06-06 DIAGNOSIS — M25.531 RIGHT WRIST PAIN: Primary | ICD-10-CM

## 2025-06-06 DIAGNOSIS — Z48.89 AFTERCARE FOLLOWING SURGERY: ICD-10-CM

## 2025-06-06 PROCEDURE — 99024 POSTOP FOLLOW-UP VISIT: CPT | Performed by: STUDENT IN AN ORGANIZED HEALTH CARE EDUCATION/TRAINING PROGRAM

## 2025-06-06 NOTE — PROGRESS NOTES
"Chief Complaint  Follow-up of the Right Wrist    Subjective          Suzanna Santos presents to [facility] for   History of Present Illness    Pat returns today for follow-up of her right wrist.  She is status post open reduction internal fixation of her right distal radius fracture on 3/25.  She reports she is doing well overall.  No incision complications.  No numbness.  Mild pain along the radial base of the thumb.  No new injuries.    Allergies   Allergen Reactions    Doxycycline Hives     .    Lariam [Mefloquine] Hives     .    Lisinopril Hives     .    Talwin [Pentazocine] Hallucinations        Social History     Socioeconomic History    Marital status:    Tobacco Use    Smoking status: Never     Passive exposure: Never    Smokeless tobacco: Never    Tobacco comments:     Grew up in smoking environm.   Vaping Use    Vaping status: Never Used   Substance and Sexual Activity    Alcohol use: No    Drug use: Never    Sexual activity: Defer     Partners: Male     Comment: age        I reviewed the patient's chief complaint, history of present illness, review of systems, past medical history, surgical history, family history, social history, medications, and allergy list.     REVIEW OF SYSTEMS    Constitutional: Denies fevers, chills, weight loss  Cardiovascular: Denies chest pain, shortness of breath  Skin: Denies rashes, acute skin changes  Neurologic: Denies headache, loss of consciousness  MSK: Right wrist pain      Objective   Vital Signs:   Ht 165.1 cm (65\")   Wt 74.8 kg (165 lb)   BMI 27.46 kg/m²     Body mass index is 27.46 kg/m².    Physical Exam    General: Alert. No acute distress.   Right upper extremity: Well-healed incision.  No redness.  Mild swelling over the radial aspect of the radiocarpal joint line.  Arthritic deformity in the hand.  Baseline arthritic stiffness with motion.  45 degrees of wrist flexion and extension.  Neurovascular " intact.    Procedures    Imaging Results (Most Recent)       Procedure Component Value Units Date/Time    XR Wrist 2 View Right [[id number]] Resulted: 06/06/25 1133     Updated: 06/06/25 1133    Narrative:      Indications: Follow-up right distal radius fracture    Views: AP and lateral right wrist    Findings: Right distal radius fracture again seen.  Callus formation and   healing along the fracture site.  Arthritic changes are noted.  No   hardware complications are seen.    Comparative Data: Comparative data found and reviewed today                     Assessment and Plan        XR Wrist 2 View Right  Result Date: 6/6/2025  Narrative: Indications: Follow-up right distal radius fracture Views: AP and lateral right wrist Findings: Right distal radius fracture again seen.  Callus formation and healing along the fracture site.  Arthritic changes are noted.  No hardware complications are seen. Comparative Data: Comparative data found and reviewed today       Diagnoses and all orders for this visit:    1. Right wrist pain (Primary)  -     XR Wrist 2 View Right    2. Aftercare following surgery right wrist ORIF performed on 03/25/25        We reviewed her imaging.  She may continue to progress activity as tolerated.  No formal restrictions at this time.  Encourage range of motion and light strengthening.  Some swelling is still normal at this point.  She will follow-up in 12 weeks for reevaluation.  Will obtain new x-rays of the right wrist when she returns      Call or return if worsening symptoms.    Scribed for Johnathan Rangel MD by Dominga Lazaro MA  06/06/2025   11:16 EDT         Follow Up       12 weeks    Patient was given instructions and counseling regarding her condition or for health maintenance advice. Please see specific information pulled into the AVS if appropriate.       I have personally performed the services described in this document as scribed by the above individual and it is both accurate and  complete. Johnathan Rangel MD 06/06/25 14:33 EDT

## 2025-06-13 ENCOUNTER — LAB (OUTPATIENT)
Dept: LAB | Facility: HOSPITAL | Age: 75
End: 2025-06-13
Payer: MEDICARE

## 2025-06-13 DIAGNOSIS — E55.9 VITAMIN D DEFICIENCY: ICD-10-CM

## 2025-06-13 DIAGNOSIS — D50.9 IRON DEFICIENCY ANEMIA, UNSPECIFIED IRON DEFICIENCY ANEMIA TYPE: ICD-10-CM

## 2025-06-13 DIAGNOSIS — I48.0 PAF (PAROXYSMAL ATRIAL FIBRILLATION): ICD-10-CM

## 2025-06-13 DIAGNOSIS — I10 ESSENTIAL HYPERTENSION: ICD-10-CM

## 2025-06-13 DIAGNOSIS — Z98.890 H/O CARDIAC RADIOFREQUENCY ABLATION: ICD-10-CM

## 2025-06-13 DIAGNOSIS — E04.1 THYROID NODULE GREATER THAN OR EQUAL TO 1 CM IN DIAMETER INCIDENTALLY NOTED ON IMAGING STUDY: ICD-10-CM

## 2025-06-13 DIAGNOSIS — K21.9 GASTRO-ESOPHAGEAL REFLUX DISEASE WITHOUT ESOPHAGITIS: ICD-10-CM

## 2025-06-13 DIAGNOSIS — Z86.0100 HISTORY OF COLON POLYPS: ICD-10-CM

## 2025-06-13 DIAGNOSIS — E78.2 MIXED HYPERLIPIDEMIA: ICD-10-CM

## 2025-06-13 DIAGNOSIS — H43.819 VITREOUS DEGENERATION, UNSPECIFIED LATERALITY: ICD-10-CM

## 2025-06-13 DIAGNOSIS — M80.08XD FRACTURE OF VERTEBRA DUE TO OSTEOPOROSIS WITH ROUTINE HEALING, SUBSEQUENT ENCOUNTER: ICD-10-CM

## 2025-06-13 DIAGNOSIS — F41.1 ANXIETY, GENERALIZED: ICD-10-CM

## 2025-06-13 DIAGNOSIS — M19.90 ARTHRITIS: ICD-10-CM

## 2025-06-13 DIAGNOSIS — F33.1 MAJOR DEPRESSIVE DISORDER, RECURRENT, MODERATE: ICD-10-CM

## 2025-06-13 DIAGNOSIS — I35.1 MILD AORTIC REGURGITATION: ICD-10-CM

## 2025-06-13 DIAGNOSIS — J45.41 MODERATE PERSISTENT ASTHMA WITH ACUTE EXACERBATION: ICD-10-CM

## 2025-06-13 DIAGNOSIS — Z00.00 MEDICARE ANNUAL WELLNESS VISIT, SUBSEQUENT: ICD-10-CM

## 2025-06-13 DIAGNOSIS — H40.1110 PRIMARY OPEN ANGLE GLAUCOMA OF RIGHT EYE, UNSPECIFIED GLAUCOMA STAGE: ICD-10-CM

## 2025-06-13 LAB
ALBUMIN SERPL-MCNC: 4.1 G/DL (ref 3.5–5.2)
ALBUMIN/GLOB SERPL: 1.9 G/DL
ALP SERPL-CCNC: 111 U/L (ref 39–117)
ALT SERPL W P-5'-P-CCNC: 10 U/L (ref 1–33)
ANION GAP SERPL CALCULATED.3IONS-SCNC: 10.6 MMOL/L (ref 5–15)
AST SERPL-CCNC: 20 U/L (ref 1–32)
BASOPHILS # BLD AUTO: 0.06 10*3/MM3 (ref 0–0.2)
BASOPHILS NFR BLD AUTO: 1 % (ref 0–1.5)
BILIRUB SERPL-MCNC: 0.5 MG/DL (ref 0–1.2)
BUN SERPL-MCNC: 16 MG/DL (ref 8–23)
BUN/CREAT SERPL: 18.4 (ref 7–25)
CALCIUM SPEC-SCNC: 9.8 MG/DL (ref 8.6–10.5)
CHLORIDE SERPL-SCNC: 100 MMOL/L (ref 98–107)
CO2 SERPL-SCNC: 25.4 MMOL/L (ref 22–29)
CREAT SERPL-MCNC: 0.87 MG/DL (ref 0.57–1)
DEPRECATED RDW RBC AUTO: 38.9 FL (ref 37–54)
EGFRCR SERPLBLD CKD-EPI 2021: 69.1 ML/MIN/1.73
EOSINOPHIL # BLD AUTO: 0.47 10*3/MM3 (ref 0–0.4)
EOSINOPHIL NFR BLD AUTO: 8.1 % (ref 0.3–6.2)
ERYTHROCYTE [DISTWIDTH] IN BLOOD BY AUTOMATED COUNT: 16.8 % (ref 12.3–15.4)
FERRITIN SERPL-MCNC: 68.3 NG/ML (ref 13–150)
GLOBULIN UR ELPH-MCNC: 2.2 GM/DL
GLUCOSE SERPL-MCNC: 103 MG/DL (ref 65–99)
HCT VFR BLD AUTO: 34.8 % (ref 34–46.6)
HGB BLD-MCNC: 10.3 G/DL (ref 12–15.9)
IMM GRANULOCYTES # BLD AUTO: 0.02 10*3/MM3 (ref 0–0.05)
IMM GRANULOCYTES NFR BLD AUTO: 0.3 % (ref 0–0.5)
IRON 24H UR-MRATE: 70 MCG/DL (ref 37–145)
IRON SATN MFR SERPL: 18 % (ref 20–50)
LYMPHOCYTES # BLD AUTO: 0.86 10*3/MM3 (ref 0.7–3.1)
LYMPHOCYTES NFR BLD AUTO: 14.8 % (ref 19.6–45.3)
MCH RBC QN AUTO: 19.8 PG (ref 26.6–33)
MCHC RBC AUTO-ENTMCNC: 29.6 G/DL (ref 31.5–35.7)
MCV RBC AUTO: 66.9 FL (ref 79–97)
MONOCYTES # BLD AUTO: 0.58 10*3/MM3 (ref 0.1–0.9)
MONOCYTES NFR BLD AUTO: 10 % (ref 5–12)
NEUTROPHILS NFR BLD AUTO: 3.83 10*3/MM3 (ref 1.7–7)
NEUTROPHILS NFR BLD AUTO: 65.8 % (ref 42.7–76)
PLATELET # BLD AUTO: 200 10*3/MM3 (ref 140–450)
POTASSIUM SERPL-SCNC: 4.2 MMOL/L (ref 3.5–5.2)
PROT SERPL-MCNC: 6.3 G/DL (ref 6–8.5)
RBC # BLD AUTO: 5.2 10*6/MM3 (ref 3.77–5.28)
SODIUM SERPL-SCNC: 136 MMOL/L (ref 136–145)
TIBC SERPL-MCNC: 384 MCG/DL (ref 298–536)
TRANSFERRIN SERPL-MCNC: 258 MG/DL (ref 200–360)
WBC NRBC COR # BLD AUTO: 5.82 10*3/MM3 (ref 3.4–10.8)

## 2025-06-13 PROCEDURE — 82728 ASSAY OF FERRITIN: CPT

## 2025-06-13 PROCEDURE — 84466 ASSAY OF TRANSFERRIN: CPT

## 2025-06-13 PROCEDURE — 36415 COLL VENOUS BLD VENIPUNCTURE: CPT

## 2025-06-13 PROCEDURE — 83540 ASSAY OF IRON: CPT

## 2025-06-13 PROCEDURE — 85025 COMPLETE CBC W/AUTO DIFF WBC: CPT

## 2025-06-13 PROCEDURE — 80053 COMPREHEN METABOLIC PANEL: CPT

## 2025-06-16 ENCOUNTER — LAB (OUTPATIENT)
Dept: LAB | Facility: HOSPITAL | Age: 75
End: 2025-06-16
Payer: MEDICARE

## 2025-06-16 DIAGNOSIS — S62.101A CLOSED FRACTURE OF RIGHT WRIST, INITIAL ENCOUNTER: ICD-10-CM

## 2025-06-16 LAB
BACTERIA UR QL AUTO: ABNORMAL /HPF
BILIRUB UR QL STRIP: NEGATIVE
CLARITY UR: CLEAR
COLOR UR: YELLOW
GLUCOSE UR STRIP-MCNC: NEGATIVE MG/DL
HGB UR QL STRIP.AUTO: NEGATIVE
HOLD SPECIMEN: NORMAL
HYALINE CASTS UR QL AUTO: ABNORMAL /LPF
KETONES UR QL STRIP: NEGATIVE
LEUKOCYTE ESTERASE UR QL STRIP.AUTO: ABNORMAL
NITRITE UR QL STRIP: NEGATIVE
PH UR STRIP.AUTO: 8 [PH] (ref 5–8)
PROT UR QL STRIP: NEGATIVE
RBC # UR STRIP: ABNORMAL /HPF
REF LAB TEST METHOD: ABNORMAL
SP GR UR STRIP: 1.01 (ref 1–1.03)
SQUAMOUS #/AREA URNS HPF: ABNORMAL /HPF
UROBILINOGEN UR QL STRIP: ABNORMAL
WBC # UR STRIP: ABNORMAL /HPF

## 2025-06-16 PROCEDURE — 81001 URINALYSIS AUTO W/SCOPE: CPT

## 2025-06-18 ENCOUNTER — OFFICE VISIT (OUTPATIENT)
Dept: INTERNAL MEDICINE | Age: 75
End: 2025-06-18
Payer: MEDICARE

## 2025-06-18 VITALS
TEMPERATURE: 98 F | OXYGEN SATURATION: 96 % | HEIGHT: 65 IN | WEIGHT: 164.6 LBS | BODY MASS INDEX: 27.42 KG/M2 | SYSTOLIC BLOOD PRESSURE: 134 MMHG | HEART RATE: 65 BPM | DIASTOLIC BLOOD PRESSURE: 80 MMHG

## 2025-06-18 DIAGNOSIS — S62.101S CLOSED FRACTURE OF RIGHT WRIST, SEQUELA: ICD-10-CM

## 2025-06-18 DIAGNOSIS — E78.2 MIXED HYPERLIPIDEMIA: ICD-10-CM

## 2025-06-18 DIAGNOSIS — D50.9 IRON DEFICIENCY ANEMIA, UNSPECIFIED IRON DEFICIENCY ANEMIA TYPE: ICD-10-CM

## 2025-06-18 DIAGNOSIS — I10 ESSENTIAL HYPERTENSION: ICD-10-CM

## 2025-06-18 DIAGNOSIS — I35.1 MILD AORTIC REGURGITATION: ICD-10-CM

## 2025-06-18 DIAGNOSIS — Z78.0 POSTMENOPAUSE: ICD-10-CM

## 2025-06-18 DIAGNOSIS — Z86.0100 HISTORY OF COLON POLYPS: ICD-10-CM

## 2025-06-18 DIAGNOSIS — F41.1 ANXIETY, GENERALIZED: ICD-10-CM

## 2025-06-18 DIAGNOSIS — S32.9XXD CLOSED NONDISPLACED FRACTURE OF PELVIS WITH ROUTINE HEALING, UNSPECIFIED PART OF PELVIS, SUBSEQUENT ENCOUNTER: Primary | ICD-10-CM

## 2025-06-18 DIAGNOSIS — I48.0 PAF (PAROXYSMAL ATRIAL FIBRILLATION): ICD-10-CM

## 2025-06-18 DIAGNOSIS — Z11.59 NEED FOR HEPATITIS C SCREENING TEST: ICD-10-CM

## 2025-06-18 DIAGNOSIS — S32.10XS CLOSED FRACTURE OF SACRUM, UNSPECIFIED PORTION OF SACRUM, SEQUELA: ICD-10-CM

## 2025-06-18 DIAGNOSIS — M19.90 ARTHRITIS: ICD-10-CM

## 2025-06-18 DIAGNOSIS — E04.1 THYROID NODULE GREATER THAN OR EQUAL TO 1 CM IN DIAMETER INCIDENTALLY NOTED ON IMAGING STUDY: ICD-10-CM

## 2025-06-18 DIAGNOSIS — F33.1 MAJOR DEPRESSIVE DISORDER, RECURRENT, MODERATE: ICD-10-CM

## 2025-06-18 DIAGNOSIS — Z98.890 H/O CARDIAC RADIOFREQUENCY ABLATION: ICD-10-CM

## 2025-06-18 DIAGNOSIS — J45.41 MODERATE PERSISTENT ASTHMA WITH ACUTE EXACERBATION: ICD-10-CM

## 2025-06-18 DIAGNOSIS — E55.9 VITAMIN D DEFICIENCY: ICD-10-CM

## 2025-06-18 PROBLEM — S32.10XA SACRAL FRACTURE, CLOSED: Status: ACTIVE | Noted: 2025-06-18

## 2025-06-18 PROCEDURE — 3079F DIAST BP 80-89 MM HG: CPT | Performed by: INTERNAL MEDICINE

## 2025-06-18 PROCEDURE — G2211 COMPLEX E/M VISIT ADD ON: HCPCS | Performed by: INTERNAL MEDICINE

## 2025-06-18 PROCEDURE — 3075F SYST BP GE 130 - 139MM HG: CPT | Performed by: INTERNAL MEDICINE

## 2025-06-18 PROCEDURE — 1125F AMNT PAIN NOTED PAIN PRSNT: CPT | Performed by: INTERNAL MEDICINE

## 2025-06-18 PROCEDURE — 99214 OFFICE O/P EST MOD 30 MIN: CPT | Performed by: INTERNAL MEDICINE

## 2025-06-18 RX ORDER — CELECOXIB 200 MG/1
200 CAPSULE ORAL DAILY
Qty: 30 CAPSULE | Refills: 5 | Status: SHIPPED | OUTPATIENT
Start: 2025-06-18

## 2025-06-18 RX ORDER — PAROXETINE 20 MG/1
20 TABLET, FILM COATED ORAL EVERY MORNING
Qty: 90 TABLET | Refills: 3 | Status: SHIPPED | OUTPATIENT
Start: 2025-06-18

## 2025-06-18 RX ORDER — PRAVASTATIN SODIUM 10 MG
10 TABLET ORAL NIGHTLY
Qty: 90 TABLET | Refills: 3 | Status: SHIPPED | OUTPATIENT
Start: 2025-06-18

## 2025-06-18 NOTE — PROGRESS NOTES
"Chief Complaint   Patient presents with    Follow-up     75 yo female presents for a 6 month follow-up visit. Pt has had labs.     Atrial Fibrillation    Back Pain     Pt C/O sudden lower back pain across her back, worse on the right following getting out of bed one day.  Sx for 1.5 months.  Taking ibuprofen, tramadol, ice, heating pad now and then, and muscle relaxer for this.    Anorexia     Pt C/O loss of appetite over the past 3 months, states she has lost 10-15 lbs.  Finds she is skipping meals, no desire to eat at times and her portion sizes have gone down when she does eat.   Takes tramadol as needed, she had a bad fall back in May fractured her right wrist pelvis and sacrum,    Objective   Vital Signs  Vitals:    06/18/25 1042   BP: 134/80   BP Location: Right arm   Patient Position: Sitting   Cuff Size: Adult   Pulse: 65   Temp: 98 °F (36.7 °C)   TempSrc: Skin   SpO2: 96%   Weight: 74.7 kg (164 lb 9.6 oz)   Height: 165.1 cm (65\")      Body mass index is 27.39 kg/m².  Review of Systems   Constitutional:  Positive for appetite change and unexpected weight loss.   Musculoskeletal:  Positive for arthralgias and back pain.    an onset of low back pain May 2025 after getting up standing it started hurting, she has tried multiple modalities to help,  Physical Exam  Constitutional:       General: She is not in acute distress.     Appearance: Normal appearance. She is obese.   HENT:      Head: Normocephalic.      Mouth/Throat:      Mouth: Mucous membranes are moist.   Eyes:      Conjunctiva/sclera: Conjunctivae normal.      Pupils: Pupils are equal, round, and reactive to light.   Cardiovascular:      Rate and Rhythm: Normal rate and regular rhythm.      Pulses: Normal pulses.      Heart sounds: Normal heart sounds.   Pulmonary:      Effort: Pulmonary effort is normal.      Breath sounds: Normal breath sounds.   Abdominal:      General: Bowel sounds are normal.      Palpations: Abdomen is soft.   Musculoskeletal:    "      General: No swelling. Normal range of motion.      Cervical back: Neck supple.   Skin:     General: Skin is warm and dry.      Coloration: Skin is not jaundiced.   Neurological:      General: No focal deficit present.      Mental Status: She is alert and oriented to person, place, and time. Mental status is at baseline.   Psychiatric:         Mood and Affect: Mood normal.         Behavior: Behavior normal.         Thought Content: Thought content normal.         Judgment: Judgment normal.        Result Review :   Lab Results   Component Value Date     04/30/2019     CMP          12/11/2024    10:43 3/19/2025    18:44 6/13/2025    10:55   CMP   Glucose 106  105  103    BUN 12  19  16.0    Creatinine 0.96  0.94  0.87    EGFR 61.8  63.0  69.1    Sodium 137  134  136    Potassium 4.1  4.3  4.2    Chloride 100  99  100    Calcium 9.7  9.4  9.8    Total Protein 6.7  6.4  6.3    Albumin 3.9  4.0  4.1    Globulin 2.8  2.4  2.2    Total Bilirubin 1.2  0.8  0.5    Alkaline Phosphatase 71  87  111    AST (SGOT) 19  20  20    ALT (SGPT) 16  16  10    Albumin/Globulin Ratio 1.4  1.7  1.9    BUN/Creatinine Ratio 12.5  20.2  18.4    Anion Gap 13.3  8.6  10.6      CBC w/diff          12/11/2024    10:43 3/19/2025    18:44 6/13/2025    10:55   CBC w/Diff   WBC 9.89  12.18  5.82    RBC 5.18  5.78  5.20    Hemoglobin 10.2  11.3  10.3    Hematocrit 33.7  36.5  34.8    MCV 65.1  63.1  66.9    MCH 19.7  19.6  19.8    MCHC 30.3  31.0  29.6    RDW 17.9  17.8  16.8    Platelets 243  278  200    Neutrophil Rel % 77.6  74.7  65.8    Immature Granulocyte Rel % 0.4  2.1  0.3    Lymphocyte Rel % 9.1  11.2  14.8    Monocyte Rel % 8.6  9.0  10.0    Eosinophil Rel % 3.4  2.7  8.1    Basophil Rel % 0.9  0.3  1.0       Lipid Panel          12/11/2024    10:43   Lipid Panel   Total Cholesterol 138    Triglycerides 79    HDL Cholesterol 38    VLDL Cholesterol 16    LDL Cholesterol  84    LDL/HDL Ratio 2.22       Lab Results   Component  Value Date    TSH 1.680 12/11/2024    TSH 2.120 06/10/2024    TSH 2.320 07/10/2023      Lab Results   Component Value Date    FREET4 1.74 (H) 12/11/2024    FREET4 1.31 06/10/2024    FREET4 1.32 07/10/2023                          Visit Diagnoses:    ICD-10-CM ICD-9-CM   1. Closed nondisplaced fracture of pelvis with routine healing, unspecified part of pelvis, subsequent encounter  S32.9XXD V54.19   2. Postmenopause  Z78.0 V49.81   3. Need for hepatitis C screening test  Z11.59 V73.89   4. Moderate persistent asthma with acute exacerbation  J45.41 493.92   5. Arthritis  M19.90 716.90   6. Major depressive disorder, recurrent, moderate  F33.1 296.32   7. Iron deficiency anemia, unspecified iron deficiency anemia type  D50.9 280.9   8. Anxiety, generalized  F41.1 300.02   9. Thyroid nodule greater than or equal to 1 cm in diameter incidentally noted on imaging study  E04.1 241.0   10. History of colon polyps  Z86.0100 V12.72   11. Vitamin D deficiency  E55.9 268.9   12. Mild aortic regurgitation  I35.1 424.1   13. Essential hypertension  I10 401.9   14. H/O cardiac radiofrequency ablation  Z98.890 V15.1   15. Mixed hyperlipidemia  E78.2 272.2   16. Paroxysmal atrial fibrillation  I48.0 427.31   17. Closed fracture of right wrist, sequela  S62.101S 905.2   18. Closed fracture of sacrum, unspecified portion of sacrum, sequela  S32.10XS 905.1       Assessment and Plan   Diagnoses and all orders for this visit:    1. Closed nondisplaced fracture of pelvis with routine healing, unspecified part of pelvis, subsequent encounter (Primary)  -     DEXA Bone Density Axial; Future  -     Hepatitis C Antibody; Future  -     pravastatin (PRAVACHOL) 10 MG tablet; Take 1 tablet by mouth Every Night.  Dispense: 90 tablet; Refill: 3  -     PARoxetine (PAXIL) 20 MG tablet; Take 1 tablet by mouth Every Morning.  Dispense: 90 tablet; Refill: 3  -     XR Spine Lumbar 2 or 3 View; Future  -     XR Spine Thoracic 3 View; Future  -      Ferritin; Future  -     Comprehensive Metabolic Panel; Future  -     CBC & Differential; Future  -     Vitamin B12 anemia; Future  -     Folate anemia; Future  -     Magnesium; Future  -     Lipid Panel; Future  -     tiZANidine (Zanaflex) 4 MG tablet; Take 1 tablet by mouth At Night As Needed for Muscle Spasms.  Dispense: 30 tablet; Refill: 5    2. Postmenopause  -     DEXA Bone Density Axial; Future  -     Hepatitis C Antibody; Future  -     pravastatin (PRAVACHOL) 10 MG tablet; Take 1 tablet by mouth Every Night.  Dispense: 90 tablet; Refill: 3  -     PARoxetine (PAXIL) 20 MG tablet; Take 1 tablet by mouth Every Morning.  Dispense: 90 tablet; Refill: 3  -     XR Spine Lumbar 2 or 3 View; Future  -     XR Spine Thoracic 3 View; Future  -     Ferritin; Future  -     Comprehensive Metabolic Panel; Future  -     CBC & Differential; Future  -     Vitamin B12 anemia; Future  -     Folate anemia; Future  -     Magnesium; Future  -     Lipid Panel; Future  -     tiZANidine (Zanaflex) 4 MG tablet; Take 1 tablet by mouth At Night As Needed for Muscle Spasms.  Dispense: 30 tablet; Refill: 5    3. Need for hepatitis C screening test  -     DEXA Bone Density Axial; Future  -     Hepatitis C Antibody; Future  -     pravastatin (PRAVACHOL) 10 MG tablet; Take 1 tablet by mouth Every Night.  Dispense: 90 tablet; Refill: 3  -     PARoxetine (PAXIL) 20 MG tablet; Take 1 tablet by mouth Every Morning.  Dispense: 90 tablet; Refill: 3  -     XR Spine Lumbar 2 or 3 View; Future  -     XR Spine Thoracic 3 View; Future  -     Ferritin; Future  -     Comprehensive Metabolic Panel; Future  -     CBC & Differential; Future  -     Vitamin B12 anemia; Future  -     Folate anemia; Future  -     Magnesium; Future  -     Lipid Panel; Future  -     tiZANidine (Zanaflex) 4 MG tablet; Take 1 tablet by mouth At Night As Needed for Muscle Spasms.  Dispense: 30 tablet; Refill: 5    4. Moderate persistent asthma with acute exacerbation  -     DEXA Bone  Density Axial; Future  -     Hepatitis C Antibody; Future  -     pravastatin (PRAVACHOL) 10 MG tablet; Take 1 tablet by mouth Every Night.  Dispense: 90 tablet; Refill: 3  -     PARoxetine (PAXIL) 20 MG tablet; Take 1 tablet by mouth Every Morning.  Dispense: 90 tablet; Refill: 3  -     XR Spine Lumbar 2 or 3 View; Future  -     XR Spine Thoracic 3 View; Future  -     Ferritin; Future  -     Comprehensive Metabolic Panel; Future  -     CBC & Differential; Future  -     Vitamin B12 anemia; Future  -     Folate anemia; Future  -     Magnesium; Future  -     Lipid Panel; Future  -     tiZANidine (Zanaflex) 4 MG tablet; Take 1 tablet by mouth At Night As Needed for Muscle Spasms.  Dispense: 30 tablet; Refill: 5    5. Arthritis  -     DEXA Bone Density Axial; Future  -     Hepatitis C Antibody; Future  -     pravastatin (PRAVACHOL) 10 MG tablet; Take 1 tablet by mouth Every Night.  Dispense: 90 tablet; Refill: 3  -     PARoxetine (PAXIL) 20 MG tablet; Take 1 tablet by mouth Every Morning.  Dispense: 90 tablet; Refill: 3  -     XR Spine Lumbar 2 or 3 View; Future  -     XR Spine Thoracic 3 View; Future  -     Ferritin; Future  -     Comprehensive Metabolic Panel; Future  -     CBC & Differential; Future  -     Vitamin B12 anemia; Future  -     Folate anemia; Future  -     Magnesium; Future  -     Lipid Panel; Future  -     tiZANidine (Zanaflex) 4 MG tablet; Take 1 tablet by mouth At Night As Needed for Muscle Spasms.  Dispense: 30 tablet; Refill: 5    6. Major depressive disorder, recurrent, moderate  -     DEXA Bone Density Axial; Future  -     Hepatitis C Antibody; Future  -     pravastatin (PRAVACHOL) 10 MG tablet; Take 1 tablet by mouth Every Night.  Dispense: 90 tablet; Refill: 3  -     PARoxetine (PAXIL) 20 MG tablet; Take 1 tablet by mouth Every Morning.  Dispense: 90 tablet; Refill: 3  -     XR Spine Lumbar 2 or 3 View; Future  -     XR Spine Thoracic 3 View; Future  -     Ferritin; Future  -     Comprehensive  Metabolic Panel; Future  -     CBC & Differential; Future  -     Vitamin B12 anemia; Future  -     Folate anemia; Future  -     Magnesium; Future  -     Lipid Panel; Future  -     tiZANidine (Zanaflex) 4 MG tablet; Take 1 tablet by mouth At Night As Needed for Muscle Spasms.  Dispense: 30 tablet; Refill: 5    7. Iron deficiency anemia, unspecified iron deficiency anemia type  -     DEXA Bone Density Axial; Future  -     Hepatitis C Antibody; Future  -     pravastatin (PRAVACHOL) 10 MG tablet; Take 1 tablet by mouth Every Night.  Dispense: 90 tablet; Refill: 3  -     PARoxetine (PAXIL) 20 MG tablet; Take 1 tablet by mouth Every Morning.  Dispense: 90 tablet; Refill: 3  -     XR Spine Lumbar 2 or 3 View; Future  -     XR Spine Thoracic 3 View; Future  -     Ferritin; Future  -     Comprehensive Metabolic Panel; Future  -     CBC & Differential; Future  -     Vitamin B12 anemia; Future  -     Folate anemia; Future  -     Magnesium; Future  -     Lipid Panel; Future  -     tiZANidine (Zanaflex) 4 MG tablet; Take 1 tablet by mouth At Night As Needed for Muscle Spasms.  Dispense: 30 tablet; Refill: 5    8. Anxiety, generalized  -     DEXA Bone Density Axial; Future  -     Hepatitis C Antibody; Future  -     pravastatin (PRAVACHOL) 10 MG tablet; Take 1 tablet by mouth Every Night.  Dispense: 90 tablet; Refill: 3  -     PARoxetine (PAXIL) 20 MG tablet; Take 1 tablet by mouth Every Morning.  Dispense: 90 tablet; Refill: 3  -     XR Spine Lumbar 2 or 3 View; Future  -     XR Spine Thoracic 3 View; Future  -     Ferritin; Future  -     Comprehensive Metabolic Panel; Future  -     CBC & Differential; Future  -     Vitamin B12 anemia; Future  -     Folate anemia; Future  -     Magnesium; Future  -     Lipid Panel; Future  -     tiZANidine (Zanaflex) 4 MG tablet; Take 1 tablet by mouth At Night As Needed for Muscle Spasms.  Dispense: 30 tablet; Refill: 5    9. Thyroid nodule greater than or equal to 1 cm in diameter incidentally  noted on imaging study  -     DEXA Bone Density Axial; Future  -     Hepatitis C Antibody; Future  -     pravastatin (PRAVACHOL) 10 MG tablet; Take 1 tablet by mouth Every Night.  Dispense: 90 tablet; Refill: 3  -     PARoxetine (PAXIL) 20 MG tablet; Take 1 tablet by mouth Every Morning.  Dispense: 90 tablet; Refill: 3  -     XR Spine Lumbar 2 or 3 View; Future  -     XR Spine Thoracic 3 View; Future  -     Ferritin; Future  -     Comprehensive Metabolic Panel; Future  -     CBC & Differential; Future  -     Vitamin B12 anemia; Future  -     Folate anemia; Future  -     Magnesium; Future  -     Lipid Panel; Future  -     tiZANidine (Zanaflex) 4 MG tablet; Take 1 tablet by mouth At Night As Needed for Muscle Spasms.  Dispense: 30 tablet; Refill: 5    10. History of colon polyps  -     DEXA Bone Density Axial; Future  -     Hepatitis C Antibody; Future  -     pravastatin (PRAVACHOL) 10 MG tablet; Take 1 tablet by mouth Every Night.  Dispense: 90 tablet; Refill: 3  -     PARoxetine (PAXIL) 20 MG tablet; Take 1 tablet by mouth Every Morning.  Dispense: 90 tablet; Refill: 3  -     XR Spine Lumbar 2 or 3 View; Future  -     XR Spine Thoracic 3 View; Future  -     Ferritin; Future  -     Comprehensive Metabolic Panel; Future  -     CBC & Differential; Future  -     Vitamin B12 anemia; Future  -     Folate anemia; Future  -     Magnesium; Future  -     Lipid Panel; Future  -     tiZANidine (Zanaflex) 4 MG tablet; Take 1 tablet by mouth At Night As Needed for Muscle Spasms.  Dispense: 30 tablet; Refill: 5    11. Vitamin D deficiency  -     DEXA Bone Density Axial; Future  -     Hepatitis C Antibody; Future  -     pravastatin (PRAVACHOL) 10 MG tablet; Take 1 tablet by mouth Every Night.  Dispense: 90 tablet; Refill: 3  -     PARoxetine (PAXIL) 20 MG tablet; Take 1 tablet by mouth Every Morning.  Dispense: 90 tablet; Refill: 3  -     XR Spine Lumbar 2 or 3 View; Future  -     XR Spine Thoracic 3 View; Future  -     Ferritin;  Future  -     Comprehensive Metabolic Panel; Future  -     CBC & Differential; Future  -     Vitamin B12 anemia; Future  -     Folate anemia; Future  -     Magnesium; Future  -     Lipid Panel; Future  -     tiZANidine (Zanaflex) 4 MG tablet; Take 1 tablet by mouth At Night As Needed for Muscle Spasms.  Dispense: 30 tablet; Refill: 5    12. Mild aortic regurgitation  -     DEXA Bone Density Axial; Future  -     Hepatitis C Antibody; Future  -     pravastatin (PRAVACHOL) 10 MG tablet; Take 1 tablet by mouth Every Night.  Dispense: 90 tablet; Refill: 3  -     PARoxetine (PAXIL) 20 MG tablet; Take 1 tablet by mouth Every Morning.  Dispense: 90 tablet; Refill: 3  -     XR Spine Lumbar 2 or 3 View; Future  -     XR Spine Thoracic 3 View; Future  -     Ferritin; Future  -     Comprehensive Metabolic Panel; Future  -     CBC & Differential; Future  -     Vitamin B12 anemia; Future  -     Folate anemia; Future  -     Magnesium; Future  -     Lipid Panel; Future  -     tiZANidine (Zanaflex) 4 MG tablet; Take 1 tablet by mouth At Night As Needed for Muscle Spasms.  Dispense: 30 tablet; Refill: 5    13. Essential hypertension  -     DEXA Bone Density Axial; Future  -     Hepatitis C Antibody; Future  -     pravastatin (PRAVACHOL) 10 MG tablet; Take 1 tablet by mouth Every Night.  Dispense: 90 tablet; Refill: 3  -     PARoxetine (PAXIL) 20 MG tablet; Take 1 tablet by mouth Every Morning.  Dispense: 90 tablet; Refill: 3  -     XR Spine Lumbar 2 or 3 View; Future  -     XR Spine Thoracic 3 View; Future  -     Ferritin; Future  -     Comprehensive Metabolic Panel; Future  -     CBC & Differential; Future  -     Vitamin B12 anemia; Future  -     Folate anemia; Future  -     Magnesium; Future  -     Lipid Panel; Future  -     tiZANidine (Zanaflex) 4 MG tablet; Take 1 tablet by mouth At Night As Needed for Muscle Spasms.  Dispense: 30 tablet; Refill: 5    14. H/O cardiac radiofrequency ablation  -     DEXA Bone Density Axial;  Future  -     Hepatitis C Antibody; Future  -     pravastatin (PRAVACHOL) 10 MG tablet; Take 1 tablet by mouth Every Night.  Dispense: 90 tablet; Refill: 3  -     PARoxetine (PAXIL) 20 MG tablet; Take 1 tablet by mouth Every Morning.  Dispense: 90 tablet; Refill: 3  -     XR Spine Lumbar 2 or 3 View; Future  -     XR Spine Thoracic 3 View; Future  -     Ferritin; Future  -     Comprehensive Metabolic Panel; Future  -     CBC & Differential; Future  -     Vitamin B12 anemia; Future  -     Folate anemia; Future  -     Magnesium; Future  -     Lipid Panel; Future  -     tiZANidine (Zanaflex) 4 MG tablet; Take 1 tablet by mouth At Night As Needed for Muscle Spasms.  Dispense: 30 tablet; Refill: 5    15. Mixed hyperlipidemia  -     DEXA Bone Density Axial; Future  -     Hepatitis C Antibody; Future  -     pravastatin (PRAVACHOL) 10 MG tablet; Take 1 tablet by mouth Every Night.  Dispense: 90 tablet; Refill: 3  -     PARoxetine (PAXIL) 20 MG tablet; Take 1 tablet by mouth Every Morning.  Dispense: 90 tablet; Refill: 3  -     XR Spine Lumbar 2 or 3 View; Future  -     XR Spine Thoracic 3 View; Future  -     Ferritin; Future  -     Comprehensive Metabolic Panel; Future  -     CBC & Differential; Future  -     Vitamin B12 anemia; Future  -     Folate anemia; Future  -     Magnesium; Future  -     Lipid Panel; Future  -     tiZANidine (Zanaflex) 4 MG tablet; Take 1 tablet by mouth At Night As Needed for Muscle Spasms.  Dispense: 30 tablet; Refill: 5    16. Paroxysmal atrial fibrillation  -     DEXA Bone Density Axial; Future  -     Hepatitis C Antibody; Future  -     pravastatin (PRAVACHOL) 10 MG tablet; Take 1 tablet by mouth Every Night.  Dispense: 90 tablet; Refill: 3  -     PARoxetine (PAXIL) 20 MG tablet; Take 1 tablet by mouth Every Morning.  Dispense: 90 tablet; Refill: 3  -     XR Spine Lumbar 2 or 3 View; Future  -     XR Spine Thoracic 3 View; Future  -     Ferritin; Future  -     Comprehensive Metabolic Panel;  Future  -     CBC & Differential; Future  -     Vitamin B12 anemia; Future  -     Folate anemia; Future  -     Magnesium; Future  -     Lipid Panel; Future  -     tiZANidine (Zanaflex) 4 MG tablet; Take 1 tablet by mouth At Night As Needed for Muscle Spasms.  Dispense: 30 tablet; Refill: 5    17. Closed fracture of right wrist, sequela  -     DEXA Bone Density Axial; Future  -     Hepatitis C Antibody; Future  -     pravastatin (PRAVACHOL) 10 MG tablet; Take 1 tablet by mouth Every Night.  Dispense: 90 tablet; Refill: 3  -     PARoxetine (PAXIL) 20 MG tablet; Take 1 tablet by mouth Every Morning.  Dispense: 90 tablet; Refill: 3  -     XR Spine Lumbar 2 or 3 View; Future  -     XR Spine Thoracic 3 View; Future  -     Ferritin; Future  -     Comprehensive Metabolic Panel; Future  -     CBC & Differential; Future  -     Vitamin B12 anemia; Future  -     Folate anemia; Future  -     Magnesium; Future  -     Lipid Panel; Future  -     tiZANidine (Zanaflex) 4 MG tablet; Take 1 tablet by mouth At Night As Needed for Muscle Spasms.  Dispense: 30 tablet; Refill: 5    18. Closed fracture of sacrum, unspecified portion of sacrum, sequela  -     DEXA Bone Density Axial; Future  -     Hepatitis C Antibody; Future  -     pravastatin (PRAVACHOL) 10 MG tablet; Take 1 tablet by mouth Every Night.  Dispense: 90 tablet; Refill: 3  -     PARoxetine (PAXIL) 20 MG tablet; Take 1 tablet by mouth Every Morning.  Dispense: 90 tablet; Refill: 3  -     XR Spine Lumbar 2 or 3 View; Future  -     XR Spine Thoracic 3 View; Future  -     Ferritin; Future  -     Comprehensive Metabolic Panel; Future  -     CBC & Differential; Future  -     Vitamin B12 anemia; Future  -     Folate anemia; Future  -     Magnesium; Future  -     Lipid Panel; Future  -     tiZANidine (Zanaflex) 4 MG tablet; Take 1 tablet by mouth At Night As Needed for Muscle Spasms.  Dispense: 30 tablet; Refill: 5    Other orders  -     celecoxib (CeleBREX) 200 MG capsule; Take 1  capsule by mouth Daily.  Dispense: 30 capsule; Refill: 5    Fall, wrist fracture status post ORIF with Dr. Rangel, pelvic fracture, and sacral fracture March 2025,    Acute worsening back pain low back pain recently May 1, 2025, etiology unclear rule out fracture previous back fractures noted below discussed June 18 2025 has been taking tramadol and extra strength Tylenol, will x-ray back again T-spine L-spine, rule out new or worsening vertebral fractures,    Injured back, x-ray MRI shows 2 new compression fractures, L3 and L4 20% body height loss old fracture T12 and a previous old T7 fracture, not noted on this film but priorly noted,    Fatigue weakness, comes and goes    ANXIETY, DEPRESSION--continues Paxil 20 mg daily     Malarial prophylaxis, have sent in [employer] for #25 once a day July 17, 2023     Tinnitus, roaring in head === tympanoplasty's  Dr. Pepe in August 2022     ARTHRITIS, IN HANDS , THUMBS , R FOOT, LOW BACK PAIN, NECK OCC.--Now with cervical radiculopathy C4-5 area, -- rheumatologist -----was diagnosed clinically with rheumatoid arthritis, CCP antibody and rheumatoid factors are negative on blood test per rheumatology September 2020,-----cont --tylenol arthritis , methocarbamol as needed tramadol as needed-- rheumatology, okay to     A. Fib with rapid rate ---first ablation 2008, second 1 was in July 2017 subsequent ablations in 2019 with pacemaker done July 29, 2019,      continues- METOPROLOL,25 BID , ELIQUIS 5 MG BID      HTN, --- continues, metoprolol 25 TWICE A DAY,  olmesartan 40/12.5 HCT,     ANEMIA, THALLESEMIA- STABLE-hemoglobin down to 10.2 macro 33.7 December 11, 2024     Glaucoma, laser treatment of the left eye, August 2021, and macular tear in the right eye, has been seeing surgery and ophthalmology as of September 2021     R TKA, 5/13     HEART MURMUR, -aortic regurgitation moderate by echo February 2016, will follow with cardiology-- dr brown, =======echo August 13, 2024 shows  mild calcification of the aortic valve mild aortic stenosis left atrial enlargement mild normal ejection fraction     GERD- , EGD JUNE 2018, CYNTHIA, ESOPAGITIS-- cont Prilosec a.m., Pepcid nightly,--- EGD December 13, 2022 Dr. Dobbs, also had a virtual colonoscopy due to difficulty with cannulating colonoscopy January 2023, virtual colonoscopy showed negative CT colonoscopy nonobstructing left renal calculus January 6, 2023     cholecystectomy, left total knee replacement 2010, previous right total hip replacement 2008, JACINTO and BSO,  R KNEE REVISION 6/2414 , WITH DR JOY     thryoid cyst, nodule -previous thyroid ultrasounds, March 2014 and April 2016 October 2020 shows large mass in the left thyroid completely cystic, measures 7.2 cm previously measuring 6.1 cm, a 1 cm hypoechoic right thyroid nodule measuring up to 0.6 cm previously, and another smaller nodule at 0.6 cm in the right lower lobe of the thyroid,----thyroid ultrasound October 7, 2021 shows bilateral thyroid nodules large left cystic thyroid nodule has increased in size benign characteristics follow-up again in 1 year the cyst measures 8.6 x 3 cm slightly increased in size------ consider ENT follow-up drainage, second opinion, versus watchful waiting,                     Follow Up   Return in about 6 months (around 12/18/2025).  Patient was given instructions and counseling regarding her condition or for health maintenance advice. Please see specific information pulled into the AVS if appropriate.

## 2025-06-19 ENCOUNTER — HOSPITAL ENCOUNTER (OUTPATIENT)
Dept: GENERAL RADIOLOGY | Facility: HOSPITAL | Age: 75
Discharge: HOME | End: 2025-06-19
Payer: MEDICARE

## 2025-06-19 DIAGNOSIS — E78.2 MIXED HYPERLIPIDEMIA: ICD-10-CM

## 2025-06-19 DIAGNOSIS — F41.1 ANXIETY, GENERALIZED: ICD-10-CM

## 2025-06-19 DIAGNOSIS — Z78.0 POSTMENOPAUSE: ICD-10-CM

## 2025-06-19 DIAGNOSIS — D50.9 IRON DEFICIENCY ANEMIA, UNSPECIFIED IRON DEFICIENCY ANEMIA TYPE: ICD-10-CM

## 2025-06-19 DIAGNOSIS — I10 ESSENTIAL HYPERTENSION: ICD-10-CM

## 2025-06-19 DIAGNOSIS — I35.1 MILD AORTIC REGURGITATION: ICD-10-CM

## 2025-06-19 DIAGNOSIS — Z11.59 NEED FOR HEPATITIS C SCREENING TEST: ICD-10-CM

## 2025-06-19 DIAGNOSIS — J45.41 MODERATE PERSISTENT ASTHMA WITH ACUTE EXACERBATION: ICD-10-CM

## 2025-06-19 DIAGNOSIS — Z86.0100 HISTORY OF COLON POLYPS: ICD-10-CM

## 2025-06-19 DIAGNOSIS — E04.1 THYROID NODULE GREATER THAN OR EQUAL TO 1 CM IN DIAMETER INCIDENTALLY NOTED ON IMAGING STUDY: ICD-10-CM

## 2025-06-19 DIAGNOSIS — S32.9XXD CLOSED NONDISPLACED FRACTURE OF PELVIS WITH ROUTINE HEALING, UNSPECIFIED PART OF PELVIS, SUBSEQUENT ENCOUNTER: ICD-10-CM

## 2025-06-19 DIAGNOSIS — S62.101S CLOSED FRACTURE OF RIGHT WRIST, SEQUELA: ICD-10-CM

## 2025-06-19 DIAGNOSIS — S32.10XS CLOSED FRACTURE OF SACRUM, UNSPECIFIED PORTION OF SACRUM, SEQUELA: ICD-10-CM

## 2025-06-19 DIAGNOSIS — E55.9 VITAMIN D DEFICIENCY: ICD-10-CM

## 2025-06-19 DIAGNOSIS — M19.90 ARTHRITIS: ICD-10-CM

## 2025-06-19 DIAGNOSIS — F33.1 MAJOR DEPRESSIVE DISORDER, RECURRENT, MODERATE: ICD-10-CM

## 2025-06-19 DIAGNOSIS — I48.0 PAF (PAROXYSMAL ATRIAL FIBRILLATION): ICD-10-CM

## 2025-06-19 DIAGNOSIS — Z98.890 H/O CARDIAC RADIOFREQUENCY ABLATION: ICD-10-CM

## 2025-06-19 PROCEDURE — 72100 X-RAY EXAM L-S SPINE 2/3 VWS: CPT

## 2025-06-19 PROCEDURE — 72072 X-RAY EXAM THORAC SPINE 3VWS: CPT

## 2025-07-15 DIAGNOSIS — I48.0 PAF (PAROXYSMAL ATRIAL FIBRILLATION): ICD-10-CM

## 2025-07-15 RX ORDER — APIXABAN 5 MG/1
5 TABLET, FILM COATED ORAL EVERY 12 HOURS SCHEDULED
Qty: 180 TABLET | Refills: 3 | Status: SHIPPED | OUTPATIENT
Start: 2025-07-15

## 2025-07-23 ENCOUNTER — OFFICE VISIT (OUTPATIENT)
Dept: CARDIOLOGY | Facility: CLINIC | Age: 75
End: 2025-07-23
Payer: MEDICARE

## 2025-07-23 VITALS
BODY MASS INDEX: 27.39 KG/M2 | SYSTOLIC BLOOD PRESSURE: 155 MMHG | WEIGHT: 164.4 LBS | HEART RATE: 59 BPM | DIASTOLIC BLOOD PRESSURE: 85 MMHG | HEIGHT: 65 IN

## 2025-07-23 DIAGNOSIS — Z95.0 PRESENCE OF CARDIAC PACEMAKER: Primary | ICD-10-CM

## 2025-07-23 DIAGNOSIS — I48.0 PAF (PAROXYSMAL ATRIAL FIBRILLATION): ICD-10-CM

## 2025-07-23 DIAGNOSIS — I10 ESSENTIAL HYPERTENSION: ICD-10-CM

## 2025-07-23 DIAGNOSIS — I35.1 MILD AORTIC REGURGITATION: ICD-10-CM

## 2025-07-23 NOTE — PROGRESS NOTES
Chief Complaint  Follow-up, Atrial Fibrillation, and Hypertension    Subjective            History of Present Illness  Suzanna Santos is a 74-year-old female patient who presents to the office today for follow-up.    History of Present Illness  The patient presents for a regular follow-up.    She is here for monitoring of her pacemaker, atrial fibrillation, and mild aortic regurgitation murmur. She also requires blood pressure management. She reports tolerating her medications well. However, she experiences occasional rapid heart rates of 160, which are brief and infrequent, and occur randomly without any preceding physical activity. She used to monitor her blood pressure at home sporadically, but it was consistently low, so she has stopped doing so. She also mentions a persistent dry cough, which she attributes to her asthma.        PMH  Past Medical History:   Diagnosis Date    Anemia     ASYMPTOMATIC    Arthritis     Asthma     inhaler prn    Benign essential hypertension     Cancer     skin    Coronary artery disease     COVID-19 long hauler manifesting chronic fatigue 01/16/2023    Diverticulosis     Eustachian tube dysfunction, bilateral 08/17/2022    GERD (gastroesophageal reflux disease)     Glaucoma     History of degenerative disc disease     Hypertension     Neuropathy     Pacemaker     [facility] - [facility] RUAN[facility]    Paroxysmal atrial fibrillation 07/13/2019     ·  ablation in 2008  afib  · Repeat fib ablation '17 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    Lariam [Mefloquine] Hives     .    Lisinopril Hives     .    Talwin [Pentazocine] Hallucinations          SURGICALHX  Past Surgical History:   Procedure Laterality Date    CARDIAC ABLATION      x4 - last 2019    CARDIAC CATHETERIZATION      CARDIAC ELECTROPHYSIOLOGY MAPPING AND ABLATION      CATARACT EXTRACTION, BILATERAL      CHOLECYSTECTOMY      COLONOSCOPY  02/08/2017    DR MARIE HISTORY OF COLON POLYPS    COLONOSCOPY  09/30/2021    dr marie    COLONOSCOPY N/A 09/30/2021    Procedure: COLONOSCOPY with BIOPSY/POLYPECTOMY COLD SNARE;  Surgeon: Tim Marie MD;  Location: [facility] ENDOSCOPY;  Service: Gastroenterology;  Laterality: N/A;  COLON POLYPS    COLONOSCOPY N/A 5/28/2024    Procedure: COLONOSCOPY;  Surgeon: Tim Marie MD;  Location: [facility] ENDOSCOPY;  Service: Gastroenterology;  Laterality: N/A;  HEMORRHOIDS    EAR TUBES Bilateral 08/30/2022    Procedure: BILATERAL MYRINGOTOMY WITH INSERTION OF EAR TUBES, NASOPHARYNGOSCOPY;  Surgeon: Ag Pepe MD;  Location: [facility] MAIN OR;  Service: ENT;  Laterality: Bilateral;    ENDOSCOPY  06/18/2018    DR MARIE    ENDOSCOPY N/A 12/13/2022    Procedure: ESOPHAGOGASTRODUODENOSCOPY WITH BIOPSIES;  Surgeon: Tim Marie MD;  Location: [facility] ENDOSCOPY;  Service: Gastroenterology;  Laterality: N/A;  HIATAL HERNIA    EYE SURGERY      Macular tear repair 3/1/2021 x2    HYSTERECTOMY      INSERT / REPLACE / REMOVE PACEMAKER      JOINT REPLACEMENT Right     total hip replacement     JOINT REPLACEMENT Bilateral     TKR    ORIF WRIST FRACTURE Right 3/25/2025    Procedure: OPEN REDUCTION INTERNAL FIXATION RIGHT DISTAL RADIUS: Surgery to fix the broken right radius with plate and screws;  Surgeon: Johnathan Rangel MD;  Location: [facility] OR OSC;  Service: Orthopedics;  Laterality: Right;    PACEMAKER REPLACEMENT      SIGMOIDOSCOPY  12/13/2022    Procedure: SIGMOIDOSCOPY FLEXIBLE;   Surgeon: Tim Mixon MD;  Location: [facility] ENDOSCOPY;  Service: Gastroenterology;;  DIVERTICULOSIS    TONSILLECTOMY      VITRECTOMY PARS PLANA W/ REPAIR OF MACULAR HOLE            SOC  Social History     Socioeconomic History    Marital status:    Tobacco Use    Smoking status: Never     Passive exposure: Never    Smokeless tobacco: Never    Tobacco comments:     Grew up in smoking environm.   Vaping Use    Vaping status: Never Used   Substance and Sexual Activity    Alcohol use: No    Drug use: Never    Sexual activity: Defer     Partners: Male     Comment: age         FAMHX  Family History   Problem Relation Age of Onset    Heart attack Mother     Heart disease Mother     Diabetes Mother     Arthritis Mother     Arrhythmia Mother     Heart attack Father     Coronary artery disease Father     Heart disease Father     Diabetes Father     Colon cancer Brother 30    Rectal cancer Brother 30    Heart disease Brother     Diabetes Brother     Arthritis Brother     Malig Hyperthermia Neg Hx           MEDSIGONLY  Current Outpatient Medications on File Prior to Visit   Medication Sig    albuterol sulfate  (90 Base) MCG/ACT inhaler 2 puffs Daily As Needed.    apixaban (Eliquis) 5 MG tablet tablet TAKE ONE TABLET BY MOUTH EVERY 12 HOURS    celecoxib (CeleBREX) 200 MG capsule Take 1 capsule by mouth Daily.    famotidine (PEPCID) 20 MG tablet Take 1 tablet by mouth Every Night.    ipratropium-albuterol (DUO-NEB) 0.5-2.5 mg/3 ml nebulizer INHALE CONTENTS OF 1 VIAL USING NEBULIZER MACHINE EVERY 4 HOURS IF NEEDED FOR WHEEZING.    metoprolol tartrate (LOPRESSOR) 25 MG tablet TAKE 1 TABLET BY MOUTH 2 (TWO) TIMES A DAY.    montelukast (SINGULAIR) 10 MG tablet TAKE 1 TABLET BY MOUTH EVERY NIGHT.    multivitamin with minerals tablet tablet Take 1 tablet by mouth Daily.    olmesartan-hydrochlorothiazide (BENICAR HCT) 40-12.5 MG per tablet TAKE 1 TABLET BY MOUTH DAILY.    Omeprazole Magnesium (PRILOSEC OTC PO)  "Take 20 mg by mouth Daily.    PARoxetine (PAXIL) 20 MG tablet Take 1 tablet by mouth Every Morning.    pravastatin (PRAVACHOL) 10 MG tablet Take 1 tablet by mouth Every Night.    saccharomyces boulardii (FLORASTOR) 250 MG capsule Take 1 capsule by mouth Daily.    tiZANidine (Zanaflex) 4 MG tablet Take 1 tablet by mouth At Night As Needed for Muscle Spasms.    traMADol (ULTRAM) 50 MG tablet Take 1 tablet by mouth Every 6 (Six) Hours As Needed for Moderate Pain .    Trelegy Ellipta 200-62.5-25 MCG/ACT inhaler INHALE 1 PUFF BY MOUTH EVERY DAY    naloxone (NARCAN) 4 MG/0.1ML nasal spray Call 911. Don't prime. [place] in 1 nostril for overdose. Repeat in 2-3 minutes in other nostril if no or minimal breathing/responsiveness. (Patient not taking: Reported on 7/23/2025)    [DISCONTINUED] folic acid (FOLVITE) 1 MG tablet Take 1 tablet by mouth Daily.    [DISCONTINUED] ondansetron (Zofran) 4 MG tablet Take 1 tablet by mouth Every 8 (Eight) Hours As Needed for Nausea or Vomiting. (Patient not taking: Reported on 6/18/2025)     No current facility-administered medications on file prior to visit.       Objective   /85   Pulse 59   Ht 165.1 cm (65\")   Wt 74.6 kg (164 lb 6.4 oz)   BMI 27.36 kg/m²       Physical Exam  HENT:      Head: Normocephalic.   Neck:      Vascular: No carotid bruit.   Cardiovascular:      Rate and Rhythm: Normal rate and regular rhythm.      Pulses: Normal pulses.      Heart sounds: Murmur heard.   Pulmonary:      Effort: Pulmonary effort is normal.      Breath sounds: Normal breath sounds.   Musculoskeletal:      Cervical back: Neck supple.      Right lower leg: No edema.      Left lower leg: No edema.   Skin:     General: Skin is dry.   Neurological:      Mental Status: She is alert and oriented to person, place, and time.   Psychiatric:         Behavior: Behavior normal.       Result Review :   The following data was reviewed by: ROSHAN Corea on 07/23/2025:  No results found for: " "\"PROBNP\"  CMP          12/11/2024    10:43 3/19/2025    18:44 6/13/2025    10:55   CMP   Glucose 106  105  103    BUN 12  19  16.0    Creatinine 0.96  0.94  0.87    EGFR 61.8  63.0  69.1    Sodium 137  134  136    Potassium 4.1  4.3  4.2    Chloride 100  99  100    Calcium 9.7  9.4  9.8    Total Protein 6.7  6.4  6.3    Albumin 3.9  4.0  4.1    Globulin 2.8  2.4  2.2    Total Bilirubin 1.2  0.8  0.5    Alkaline Phosphatase 71  87  111    AST (SGOT) 19  20  20    ALT (SGPT) 16  16  10    Albumin/Globulin Ratio 1.4  1.7  1.9    BUN/Creatinine Ratio 12.5  20.2  18.4    Anion Gap 13.3  8.6  10.6      CBC w/diff          12/11/2024    10:43 3/19/2025    18:44 6/13/2025    10:55   CBC w/Diff   WBC 9.89  12.18  5.82    RBC 5.18  5.78  5.20    Hemoglobin 10.2  11.3  10.3    Hematocrit 33.7  36.5  34.8    MCV 65.1  63.1  66.9    MCH 19.7  19.6  19.8    MCHC 30.3  31.0  29.6    RDW 17.9  17.8  16.8    Platelets 243  278  200    Neutrophil Rel % 77.6  74.7  65.8    Immature Granulocyte Rel % 0.4  2.1  0.3    Lymphocyte Rel % 9.1  11.2  14.8    Monocyte Rel % 8.6  9.0  10.0    Eosinophil Rel % 3.4  2.7  8.1    Basophil Rel % 0.9  0.3  1.0       Lab Results   Component Value Date    TSH 1.680 12/11/2024      Lab Results   Component Value Date    FREET4 1.74 (H) 12/11/2024      No results found for: \"DDIMERQUANT\"  No results found for: \"MG\"   No results found for: \"DIGOXIN\"   No results found for: \"TROPONINT\"        Lipid Panel          12/11/2024    10:43   Lipid Panel   Total Cholesterol 138    Triglycerides 79    HDL Cholesterol 38    VLDL Cholesterol 16    LDL Cholesterol  84    LDL/HDL Ratio 2.22        Results for orders placed during the hospital encounter of 08/09/24    Adult Transthoracic Echo Complete W/ Cont if Necessary Per Protocol 08/13/2024  9:23 AM    Interpretation Summary    The study is technically difficult for diagnosis.    Left ventricular systolic function is normal. Calculated left ventricular EF = 52.4%   "  Left ventricular wall thickness is consistent with mild concentric hypertrophy.    The left atrial cavity is mild to moderately dilated.    Borderline  mild aortic stenosis    There is mild calcification of the aortic valve.           Assessment and Plan    Diagnoses and all orders for this visit:    1. Presence of cardiac pacemaker (Primary)    2. Paroxysmal atrial fibrillation    3. Mild aortic regurgitation    4. Essential hypertension      Assessment & Plan  1. Atrial Fibrillation:  - The device report from 05/05/2025 indicates intermittent episodes of atrial fibrillation, with a frequency of approximately 12%. The longest recorded episode lasted for 2 minutes and 44 seconds.  - The device's battery life is estimated to last another 4.5 years.  - Eliquis 5 mg twice daily to reduce stroke risk.  - Metoprolol 25 mg twice daily to regulate heart rate.  - Reports tolerating these medications well but experiences occasional fast heart rates of 160 bpm, which are brief and infrequent.  - If the frequency or duration of these episodes increases, notify the clinic.  - A formal device interrogation will be scheduled during the next visit in 6 months.    2. Mild Aortic Regurgitation Murmur:  - An ultrasound conducted in 08/2024 revealed mild calcification and borderline narrowing of the aortic valve, indicative of mild aortic stenosis.  - Given the mild nature of this condition, a repeat echocardiogram will be scheduled every 3 to 5 years.    3. Blood Pressure Management:  - Blood pressure was slightly elevated during today's visit, but medication was taken an hour prior.  - Currently on olmesartan/hydrochlorothiazide 40-12.5 mg daily for blood pressure management.  - A blood pressure log was provided to record readings twice daily, once in the morning and once at night, for a period of 2 weeks.  - This will help determine if more aggressive treatment is needed.  - The completed log can be dropped off at the   or sent via fax.  - Upon receipt of the log, a phone call will be made to discuss further recommendations.      Follow-up: A follow-up visit is scheduled in 6 months.      Follow Up   Return in about 6 months (around 1/23/2026) for Follow up with Dr Shook with device check.    Patient was given instructions and counseling regarding her condition or for health maintenance advice. Please see specific information pulled into the AVS if appropriate.     Suzanna Santos  reports that she has never smoked. She has never been exposed to tobacco smoke. She has never used smokeless tobacco.            Patient or patient representative verbalized consent for the use of Ambient Listening during the visit with  ROSHAN Corea for chart documentation. 7/23/2025  16:03 EDT    ROSHAN Corea  07/23/25  13:56 EDT    Dictated Utilizing Dragon Dictation

## 2025-08-04 ENCOUNTER — OFFICE VISIT (OUTPATIENT)
Dept: ORTHOPEDIC SURGERY | Facility: CLINIC | Age: 75
End: 2025-08-04
Payer: MEDICARE

## 2025-08-04 VITALS — HEIGHT: 65 IN | BODY MASS INDEX: 27.32 KG/M2 | WEIGHT: 164 LBS

## 2025-08-04 DIAGNOSIS — M25.551 RIGHT HIP PAIN: Primary | ICD-10-CM

## 2025-08-04 DIAGNOSIS — M19.032 PRIMARY OSTEOARTHRITIS OF LEFT WRIST: ICD-10-CM

## 2025-08-04 DIAGNOSIS — M25.532 LEFT WRIST PAIN: ICD-10-CM

## 2025-08-04 DIAGNOSIS — Z96.641 HISTORY OF TOTAL RIGHT HIP ARTHROPLASTY: ICD-10-CM

## 2025-08-04 DIAGNOSIS — M70.61 TROCHANTERIC BURSITIS OF RIGHT HIP: ICD-10-CM

## 2025-08-04 LAB
MDC_IDC_MSMT_BATTERY_REMAINING_LONGEVITY: 52 MO
MDC_IDC_MSMT_BATTERY_REMAINING_PERCENTAGE: 43 %
MDC_IDC_MSMT_BATTERY_RRT_TRIGGER: 2.6
MDC_IDC_MSMT_BATTERY_STATUS: NORMAL
MDC_IDC_MSMT_BATTERY_VOLTAGE: 2.98
MDC_IDC_MSMT_LEADCHNL_RA_IMPEDANCE_VALUE: 460
MDC_IDC_MSMT_LEADCHNL_RA_PACING_THRESHOLD_POLARITY: NORMAL
MDC_IDC_MSMT_LEADCHNL_RA_SENSING_INTR_AMPL: 3.6
MDC_IDC_MSMT_LEADCHNL_RV_IMPEDANCE_VALUE: 530
MDC_IDC_MSMT_LEADCHNL_RV_PACING_THRESHOLD_POLARITY: NORMAL
MDC_IDC_MSMT_LEADCHNL_RV_SENSING_INTR_AMPL: 3.1
MDC_IDC_PG_IMPLANT_DTM: NORMAL
MDC_IDC_PG_MFG: NORMAL
MDC_IDC_PG_MODEL: NORMAL
MDC_IDC_PG_SERIAL: NORMAL
MDC_IDC_PG_TYPE: NORMAL
MDC_IDC_SESS_DTM: NORMAL
MDC_IDC_SESS_TYPE: NORMAL
MDC_IDC_SET_BRADY_AT_MODE_SWITCH_RATE: 140
MDC_IDC_SET_BRADY_LOWRATE: 60
MDC_IDC_SET_BRADY_MAX_SENSOR_RATE: 105
MDC_IDC_SET_BRADY_MAX_TRACKING_RATE: 120
MDC_IDC_SET_BRADY_MODE: NORMAL
MDC_IDC_SET_BRADY_PAV_DELAY: 350
MDC_IDC_SET_BRADY_SAV_DELAY: 325
MDC_IDC_SET_LEADCHNL_RA_PACING_AMPLITUDE: 2
MDC_IDC_SET_LEADCHNL_RA_PACING_POLARITY: NORMAL
MDC_IDC_SET_LEADCHNL_RA_PACING_PULSEWIDTH: 0.4
MDC_IDC_SET_LEADCHNL_RA_SENSING_POLARITY: NORMAL
MDC_IDC_SET_LEADCHNL_RA_SENSING_SENSITIVITY: 0.5
MDC_IDC_SET_LEADCHNL_RV_PACING_AMPLITUDE: 2
MDC_IDC_SET_LEADCHNL_RV_PACING_POLARITY: NORMAL
MDC_IDC_SET_LEADCHNL_RV_PACING_PULSEWIDTH: 0.4
MDC_IDC_SET_LEADCHNL_RV_SENSING_POLARITY: NORMAL
MDC_IDC_SET_LEADCHNL_RV_SENSING_SENSITIVITY: 1.5
MDC_IDC_STAT_AT_BURDEN_PERCENT: 6
MDC_IDC_STAT_BRADY_RA_PERCENT_PACED: 40
MDC_IDC_STAT_BRADY_RV_PERCENT_PACED: 16

## 2025-08-11 DIAGNOSIS — J45.901 MODERATE ASTHMA WITH ACUTE EXACERBATION, UNSPECIFIED WHETHER PERSISTENT: ICD-10-CM

## 2025-08-11 DIAGNOSIS — R05.3 CHRONIC COUGH: ICD-10-CM

## 2025-08-11 RX ORDER — MONTELUKAST SODIUM 10 MG/1
10 TABLET ORAL NIGHTLY
Qty: 30 TABLET | Refills: 2 | Status: SHIPPED | OUTPATIENT
Start: 2025-08-11

## 2025-08-12 LAB
MDC_IDC_MSMT_BATTERY_REMAINING_LONGEVITY: 52 MO
MDC_IDC_MSMT_BATTERY_REMAINING_PERCENTAGE: 43 %
MDC_IDC_MSMT_BATTERY_RRT_TRIGGER: 2.6
MDC_IDC_MSMT_BATTERY_STATUS: NORMAL
MDC_IDC_MSMT_BATTERY_VOLTAGE: 2.98
MDC_IDC_MSMT_LEADCHNL_RA_IMPEDANCE_VALUE: 460
MDC_IDC_MSMT_LEADCHNL_RA_PACING_THRESHOLD_POLARITY: NORMAL
MDC_IDC_MSMT_LEADCHNL_RA_SENSING_INTR_AMPL: 3.6
MDC_IDC_MSMT_LEADCHNL_RV_IMPEDANCE_VALUE: 530
MDC_IDC_MSMT_LEADCHNL_RV_PACING_THRESHOLD_POLARITY: NORMAL
MDC_IDC_MSMT_LEADCHNL_RV_SENSING_INTR_AMPL: 3.1
MDC_IDC_PG_IMPLANT_DTM: NORMAL
MDC_IDC_PG_MFG: NORMAL
MDC_IDC_PG_MODEL: NORMAL
MDC_IDC_PG_SERIAL: NORMAL
MDC_IDC_PG_TYPE: NORMAL
MDC_IDC_SESS_DTM: NORMAL
MDC_IDC_SESS_TYPE: NORMAL
MDC_IDC_SET_BRADY_AT_MODE_SWITCH_RATE: 140
MDC_IDC_SET_BRADY_LOWRATE: 60
MDC_IDC_SET_BRADY_MAX_SENSOR_RATE: 105
MDC_IDC_SET_BRADY_MAX_TRACKING_RATE: 120
MDC_IDC_SET_BRADY_MODE: NORMAL
MDC_IDC_SET_BRADY_PAV_DELAY: 350
MDC_IDC_SET_BRADY_SAV_DELAY: 325
MDC_IDC_SET_LEADCHNL_RA_PACING_AMPLITUDE: 2
MDC_IDC_SET_LEADCHNL_RA_PACING_POLARITY: NORMAL
MDC_IDC_SET_LEADCHNL_RA_PACING_PULSEWIDTH: 0.4
MDC_IDC_SET_LEADCHNL_RA_SENSING_POLARITY: NORMAL
MDC_IDC_SET_LEADCHNL_RA_SENSING_SENSITIVITY: 0.5
MDC_IDC_SET_LEADCHNL_RV_PACING_AMPLITUDE: 2
MDC_IDC_SET_LEADCHNL_RV_PACING_POLARITY: NORMAL
MDC_IDC_SET_LEADCHNL_RV_PACING_PULSEWIDTH: 0.4
MDC_IDC_SET_LEADCHNL_RV_SENSING_POLARITY: NORMAL
MDC_IDC_SET_LEADCHNL_RV_SENSING_SENSITIVITY: 1.5
MDC_IDC_STAT_AT_BURDEN_PERCENT: 6
MDC_IDC_STAT_BRADY_RA_PERCENT_PACED: 40
MDC_IDC_STAT_BRADY_RV_PERCENT_PACED: 16

## (undated) DEVICE — GAUZE,SPONGE,4"X4",16PLY,STRL,LF,10/TRAY: Brand: MEDLINE

## (undated) DEVICE — SLV SCD KN/LEN ADJ EXPRSS BLENDED MD 1P/U

## (undated) DEVICE — SOL IRRG H2O PL/BG 1000ML STRL

## (undated) DEVICE — SCREWDRIVER BLADE T8 AO, SELF RETAINING: Brand: VARIAX

## (undated) DEVICE — UNDERCAST PADDING: Brand: DEROYAL

## (undated) DEVICE — Device: Brand: DEFENDO AIR/WATER/SUCTION AND BIOPSY VALVE

## (undated) DEVICE — DRESSING,GAUZE,XEROFORM,CURAD,1"X8",ST: Brand: CURAD

## (undated) DEVICE — SINGLE-USE BIOPSY FORCEPS: Brand: RADIAL JAW 4

## (undated) DEVICE — THE SINGLE USE ETRAP – POLYP TRAP IS USED FOR SUCTION RETRIEVAL OF ENDOSCOPICALLY REMOVED POLYPS.: Brand: ETRAP

## (undated) DEVICE — GLV SURG SENSICARE PI ORTHO SZ8 LF STRL

## (undated) DEVICE — COVER,MAYO STAND,STERILE: Brand: MEDLINE

## (undated) DEVICE — SOLIDIFIER LIQLOC PLS 1500CC BT

## (undated) DEVICE — DRILL BIT, AO, SCALED: Brand: VARIAX

## (undated) DEVICE — 450 ML BOTTLE OF 0.05% CHLORHEXIDINE GLUCONATE IN 99.95% STERILE WATER FOR IRRIGATION, USP AND APPLICATOR.: Brand: IRRISEPT ANTIMICROBIAL WOUND LAVAGE

## (undated) DEVICE — BG RESUS AIRFLOW BVM O2 MANL W/STD/MASK A/ SM 1P/U

## (undated) DEVICE — SYR LL TP 10ML STRL

## (undated) DEVICE — EXTREMITY-LF: Brand: MEDLINE INDUSTRIES, INC.

## (undated) DEVICE — COLON KIT: Brand: MEDLINE INDUSTRIES, INC.

## (undated) DEVICE — GAUZE,SPONGE,4"X4",32PLY,XRAY,STRL,LF: Brand: MEDLINE

## (undated) DEVICE — Device

## (undated) DEVICE — THE STERILE LIGHT HANDLE COVER IS USED WITH STERIS SURGICAL LIGHTING AND VISUALIZATION SYSTEMS.

## (undated) DEVICE — GLV SURG BIOGEL LTX PF 7 1/2

## (undated) DEVICE — MEDI-VAC NON-CONDUCTIVE SUCTION TUBING: Brand: CARDINAL HEALTH

## (undated) DEVICE — LINER SURG CANSTR SXN S/RIGD 1500CC

## (undated) DEVICE — COMFORT ARM SLING: Brand: DEROYAL

## (undated) DEVICE — BNDG ELAS ECON W/CLIP 4IN 5YD LF STRL

## (undated) DEVICE — MARKR SKIN W/RULR AND LBL

## (undated) DEVICE — BLCK/BITE BLOX WO/DENTL/RIM W/STRAP 54F

## (undated) DEVICE — PENCL SMOKE/EVAC MEGADYNE TELESCP 10FT

## (undated) DEVICE — SUT VIC 3/0 SH 27IN J416H

## (undated) DEVICE — DISPOSABLE TOURNIQUET CUFF SINGLE BLADDER, SINGLE PORT AND QUICK CONNECT CONNECTOR: Brand: COLOR CUFF

## (undated) DEVICE — TOWEL,OR,DSP,ST,BLUE,STD,4/PK,20PK/CS: Brand: MEDLINE

## (undated) DEVICE — BNDG ESMARK 4IN 12FT LF STRL BLU

## (undated) DEVICE — BALL COTN PREPPING BG/2000

## (undated) DEVICE — STERILE POLYISOPRENE POWDER-FREE SURGICAL GLOVES: Brand: PROTEXIS

## (undated) DEVICE — CONN JET HYDRA H20 AUXILIARY DISP

## (undated) DEVICE — SNAR E/S POLYP SNAREMASTER OVL/10MM 2.8X2300MM YEL

## (undated) DEVICE — GOWN,SIRUS,POLYRNF,BRTHSLV,XLN/XXL,18/CS: Brand: MEDLINE

## (undated) DEVICE — INTENDED FOR TISSUE SEPARATION, AND OTHER PROCEDURES THAT REQUIRE A SHARP SURGICAL BLADE TO PUNCTURE OR CUT.: Brand: BARD-PARKER ® CARBON RIB-BACK BLADES

## (undated) DEVICE — COVER,C-ARM,41X74: Brand: MEDLINE

## (undated) DEVICE — SUT ETHLN 3-0 FS118IN 663H

## (undated) DEVICE — BLD MYRNGTMY FLT ARROW/JUVENILE DISP